# Patient Record
Sex: FEMALE | Race: WHITE | NOT HISPANIC OR LATINO | URBAN - METROPOLITAN AREA
[De-identification: names, ages, dates, MRNs, and addresses within clinical notes are randomized per-mention and may not be internally consistent; named-entity substitution may affect disease eponyms.]

---

## 2017-06-05 ENCOUNTER — OUTPATIENT (OUTPATIENT)
Dept: OUTPATIENT SERVICES | Facility: HOSPITAL | Age: 67
LOS: 1 days | Discharge: HOME | End: 2017-06-05

## 2017-06-28 DIAGNOSIS — R52 PAIN, UNSPECIFIED: ICD-10-CM

## 2017-10-14 ENCOUNTER — EMERGENCY (EMERGENCY)
Facility: HOSPITAL | Age: 67
LOS: 0 days | Discharge: HOME | End: 2017-10-14

## 2017-10-14 DIAGNOSIS — Z79.899 OTHER LONG TERM (CURRENT) DRUG THERAPY: ICD-10-CM

## 2017-10-14 DIAGNOSIS — Z91.040 LATEX ALLERGY STATUS: ICD-10-CM

## 2017-10-14 DIAGNOSIS — K21.9 GASTRO-ESOPHAGEAL REFLUX DISEASE WITHOUT ESOPHAGITIS: ICD-10-CM

## 2017-10-14 DIAGNOSIS — Z98.891 HISTORY OF UTERINE SCAR FROM PREVIOUS SURGERY: ICD-10-CM

## 2017-10-14 DIAGNOSIS — Z98.890 OTHER SPECIFIED POSTPROCEDURAL STATES: ICD-10-CM

## 2017-10-14 DIAGNOSIS — E03.9 HYPOTHYROIDISM, UNSPECIFIED: ICD-10-CM

## 2017-10-14 DIAGNOSIS — R10.9 UNSPECIFIED ABDOMINAL PAIN: ICD-10-CM

## 2017-10-14 DIAGNOSIS — Z88.0 ALLERGY STATUS TO PENICILLIN: ICD-10-CM

## 2017-10-14 DIAGNOSIS — Z88.5 ALLERGY STATUS TO NARCOTIC AGENT: ICD-10-CM

## 2017-10-14 DIAGNOSIS — I10 ESSENTIAL (PRIMARY) HYPERTENSION: ICD-10-CM

## 2018-02-23 ENCOUNTER — OUTPATIENT (OUTPATIENT)
Dept: OUTPATIENT SERVICES | Facility: HOSPITAL | Age: 68
LOS: 1 days | Discharge: HOME | End: 2018-02-23

## 2018-02-23 DIAGNOSIS — Z12.31 ENCOUNTER FOR SCREENING MAMMOGRAM FOR MALIGNANT NEOPLASM OF BREAST: ICD-10-CM

## 2018-05-02 ENCOUNTER — EMERGENCY (EMERGENCY)
Facility: HOSPITAL | Age: 68
LOS: 0 days | Discharge: HOME | End: 2018-05-02
Attending: EMERGENCY MEDICINE | Admitting: EMERGENCY MEDICINE

## 2018-05-02 VITALS
TEMPERATURE: 97 F | RESPIRATION RATE: 18 BRPM | DIASTOLIC BLOOD PRESSURE: 80 MMHG | WEIGHT: 195.11 LBS | SYSTOLIC BLOOD PRESSURE: 123 MMHG | HEIGHT: 69 IN | OXYGEN SATURATION: 99 % | HEART RATE: 92 BPM

## 2018-05-02 VITALS
OXYGEN SATURATION: 99 % | SYSTOLIC BLOOD PRESSURE: 142 MMHG | HEART RATE: 78 BPM | RESPIRATION RATE: 16 BRPM | DIASTOLIC BLOOD PRESSURE: 76 MMHG

## 2018-05-02 DIAGNOSIS — Z88.5 ALLERGY STATUS TO NARCOTIC AGENT: ICD-10-CM

## 2018-05-02 DIAGNOSIS — E03.9 HYPOTHYROIDISM, UNSPECIFIED: ICD-10-CM

## 2018-05-02 DIAGNOSIS — Z91.040 LATEX ALLERGY STATUS: ICD-10-CM

## 2018-05-02 DIAGNOSIS — Z79.899 OTHER LONG TERM (CURRENT) DRUG THERAPY: ICD-10-CM

## 2018-05-02 DIAGNOSIS — I10 ESSENTIAL (PRIMARY) HYPERTENSION: ICD-10-CM

## 2018-05-02 DIAGNOSIS — Z88.8 ALLERGY STATUS TO OTHER DRUGS, MEDICAMENTS AND BIOLOGICAL SUBSTANCES: ICD-10-CM

## 2018-05-02 DIAGNOSIS — R19.7 DIARRHEA, UNSPECIFIED: ICD-10-CM

## 2018-05-02 DIAGNOSIS — Z88.0 ALLERGY STATUS TO PENICILLIN: ICD-10-CM

## 2018-05-02 DIAGNOSIS — R11.2 NAUSEA WITH VOMITING, UNSPECIFIED: ICD-10-CM

## 2018-05-02 LAB
ALBUMIN SERPL ELPH-MCNC: 4.2 G/DL — SIGNIFICANT CHANGE UP (ref 3.5–5.2)
ALP SERPL-CCNC: 100 U/L — SIGNIFICANT CHANGE UP (ref 30–115)
ALT FLD-CCNC: 23 U/L — SIGNIFICANT CHANGE UP (ref 0–41)
ANION GAP SERPL CALC-SCNC: 13 MMOL/L — SIGNIFICANT CHANGE UP (ref 7–14)
APPEARANCE UR: (no result)
APTT BLD: 29.4 SEC — SIGNIFICANT CHANGE UP (ref 27–39.2)
AST SERPL-CCNC: 20 U/L — SIGNIFICANT CHANGE UP (ref 0–41)
BACTERIA # UR AUTO: (no result)
BASOPHILS # BLD AUTO: 0.01 K/UL — SIGNIFICANT CHANGE UP (ref 0–0.2)
BASOPHILS NFR BLD AUTO: 0.1 % — SIGNIFICANT CHANGE UP (ref 0–1)
BILIRUB SERPL-MCNC: 0.3 MG/DL — SIGNIFICANT CHANGE UP (ref 0.2–1.2)
BILIRUB UR-MCNC: NEGATIVE — SIGNIFICANT CHANGE UP
BUN SERPL-MCNC: 20 MG/DL — SIGNIFICANT CHANGE UP (ref 10–20)
CALCIUM SERPL-MCNC: 9.1 MG/DL — SIGNIFICANT CHANGE UP (ref 8.5–10.1)
CHLORIDE SERPL-SCNC: 93 MMOL/L — LOW (ref 98–110)
CO2 SERPL-SCNC: 30 MMOL/L — SIGNIFICANT CHANGE UP (ref 17–32)
COD CRY URNS QL: NEGATIVE — SIGNIFICANT CHANGE UP
COLOR SPEC: YELLOW — SIGNIFICANT CHANGE UP
CREAT SERPL-MCNC: 0.7 MG/DL — SIGNIFICANT CHANGE UP (ref 0.7–1.5)
DIFF PNL FLD: (no result)
EOSINOPHIL # BLD AUTO: 0.05 K/UL — SIGNIFICANT CHANGE UP (ref 0–0.7)
EOSINOPHIL NFR BLD AUTO: 0.6 % — SIGNIFICANT CHANGE UP (ref 0–8)
EPI CELLS # UR: (no result) /HPF
GLUCOSE SERPL-MCNC: 126 MG/DL — HIGH (ref 70–99)
GLUCOSE UR QL: NEGATIVE MG/DL — SIGNIFICANT CHANGE UP
HCT VFR BLD CALC: 41 % — SIGNIFICANT CHANGE UP (ref 37–47)
HGB BLD-MCNC: 13.9 G/DL — SIGNIFICANT CHANGE UP (ref 12–16)
IMM GRANULOCYTES NFR BLD AUTO: 0.2 % — SIGNIFICANT CHANGE UP (ref 0.1–0.3)
INR BLD: 1.15 RATIO — SIGNIFICANT CHANGE UP (ref 0.65–1.3)
KETONES UR-MCNC: NEGATIVE — SIGNIFICANT CHANGE UP
LEUKOCYTE ESTERASE UR-ACNC: (no result)
LIDOCAIN IGE QN: 31 U/L — SIGNIFICANT CHANGE UP (ref 7–60)
LYMPHOCYTES # BLD AUTO: 0.58 K/UL — LOW (ref 1.2–3.4)
LYMPHOCYTES # BLD AUTO: 6.9 % — LOW (ref 20.5–51.1)
MCHC RBC-ENTMCNC: 30.3 PG — SIGNIFICANT CHANGE UP (ref 27–31)
MCHC RBC-ENTMCNC: 33.9 G/DL — SIGNIFICANT CHANGE UP (ref 32–37)
MCV RBC AUTO: 89.3 FL — SIGNIFICANT CHANGE UP (ref 81–99)
MONOCYTES # BLD AUTO: 0.56 K/UL — SIGNIFICANT CHANGE UP (ref 0.1–0.6)
MONOCYTES NFR BLD AUTO: 6.7 % — SIGNIFICANT CHANGE UP (ref 1.7–9.3)
NEUTROPHILS # BLD AUTO: 7.16 K/UL — HIGH (ref 1.4–6.5)
NEUTROPHILS NFR BLD AUTO: 85.5 % — HIGH (ref 42.2–75.2)
NITRITE UR-MCNC: NEGATIVE — SIGNIFICANT CHANGE UP
NRBC # BLD: 0 /100 WBCS — SIGNIFICANT CHANGE UP (ref 0–0)
PH UR: 7 — SIGNIFICANT CHANGE UP (ref 5–8)
PLATELET # BLD AUTO: 232 K/UL — SIGNIFICANT CHANGE UP (ref 130–400)
POTASSIUM SERPL-MCNC: 4.1 MMOL/L — SIGNIFICANT CHANGE UP (ref 3.5–5)
POTASSIUM SERPL-SCNC: 4.1 MMOL/L — SIGNIFICANT CHANGE UP (ref 3.5–5)
PROT SERPL-MCNC: 6.9 G/DL — SIGNIFICANT CHANGE UP (ref 6–8)
PROT UR-MCNC: NEGATIVE MG/DL — SIGNIFICANT CHANGE UP
PROTHROM AB SERPL-ACNC: 12.5 SEC — SIGNIFICANT CHANGE UP (ref 9.95–12.87)
RBC # BLD: 4.59 M/UL — SIGNIFICANT CHANGE UP (ref 4.2–5.4)
RBC # FLD: 13.7 % — SIGNIFICANT CHANGE UP (ref 11.5–14.5)
RBC CASTS # UR COMP ASSIST: (no result) /HPF
SODIUM SERPL-SCNC: 136 MMOL/L — SIGNIFICANT CHANGE UP (ref 135–146)
SP GR SPEC: 1.01 — SIGNIFICANT CHANGE UP (ref 1.01–1.03)
TRI-PHOS CRY UR QL COMP ASSIST: NEGATIVE — SIGNIFICANT CHANGE UP
URATE CRY FLD QL MICRO: NEGATIVE — SIGNIFICANT CHANGE UP
UROBILINOGEN FLD QL: 0.2 MG/DL — SIGNIFICANT CHANGE UP (ref 0.2–0.2)
WBC # BLD: 8.38 K/UL — SIGNIFICANT CHANGE UP (ref 4.8–10.8)
WBC # FLD AUTO: 8.38 K/UL — SIGNIFICANT CHANGE UP (ref 4.8–10.8)
WBC UR QL: SIGNIFICANT CHANGE UP /HPF

## 2018-05-02 RX ORDER — FAMOTIDINE 10 MG/ML
20 INJECTION INTRAVENOUS DAILY
Qty: 0 | Refills: 0 | Status: DISCONTINUED | OUTPATIENT
Start: 2018-05-02 | End: 2018-05-02

## 2018-05-02 RX ORDER — FAMOTIDINE 10 MG/ML
40 INJECTION INTRAVENOUS ONCE
Qty: 0 | Refills: 0 | Status: DISCONTINUED | OUTPATIENT
Start: 2018-05-02 | End: 2018-05-02

## 2018-05-02 RX ORDER — ONDANSETRON 8 MG/1
4 TABLET, FILM COATED ORAL ONCE
Qty: 0 | Refills: 0 | Status: COMPLETED | OUTPATIENT
Start: 2018-05-02 | End: 2018-05-02

## 2018-05-02 RX ORDER — SODIUM CHLORIDE 9 MG/ML
1000 INJECTION INTRAMUSCULAR; INTRAVENOUS; SUBCUTANEOUS ONCE
Qty: 0 | Refills: 0 | Status: COMPLETED | OUTPATIENT
Start: 2018-05-02 | End: 2018-05-02

## 2018-05-02 RX ADMIN — FAMOTIDINE 100 MILLIGRAM(S): 10 INJECTION INTRAVENOUS at 18:53

## 2018-05-02 RX ADMIN — ONDANSETRON 4 MILLIGRAM(S): 8 TABLET, FILM COATED ORAL at 21:05

## 2018-05-02 RX ADMIN — ONDANSETRON 4 MILLIGRAM(S): 8 TABLET, FILM COATED ORAL at 18:53

## 2018-05-02 RX ADMIN — SODIUM CHLORIDE 1 MILLILITER(S): 9 INJECTION INTRAMUSCULAR; INTRAVENOUS; SUBCUTANEOUS at 18:53

## 2018-05-02 NOTE — ED PROVIDER NOTE - NS ED ROS FT
Review of Systems:  	•	CONSTITUTIONAL - no fever, no diaphoresis, no chills  	•	SKIN - no rash  	•	HEMATOLOGIC - no bleeding, no bruising  	•	EYES - no eye pain, no blurry vision  	•	ENT - no change in hearing, no sore throat, no ear pain or tinnitus  	•	RESPIRATORY - no shortness of breath, no cough  	•	CARDIAC - no chest pain, no palpitations  	•	GI - no abd pain,  nausea,  vomiting, diarrhea, no constipation  	•	GENITO-URINARY - no discharge, no dysuria; no hematuria, no increased urinary frequency  	•	MUSCULOSKELETAL - no joint paint, no swelling, no redness  	•	NEUROLOGIC - no weakness, no headache, no paresthesias, no LOC  	•	PSYCH - no anxiety, non suicidal, non homicidal, no hallucination, no depression

## 2018-05-02 NOTE — ED PROVIDER NOTE - MEDICAL DECISION MAKING DETAILS
Results reviewed, d/w patient.  will return if worse, rec oral hydration, bland diet as tolerated, f/u PMD , return if worse

## 2018-05-02 NOTE — ED PROVIDER NOTE - OBJECTIVE STATEMENT
this is 68 yo female presents to ed for evalution of N/V/D  patient states diarrhea is better now.   patietn is still nauseous but no vomiting.   patient denies any abdominal pain . no fever no chills

## 2018-05-02 NOTE — ED PROVIDER NOTE - PROGRESS NOTE DETAILS
patient is feeling better Patient to be discharged from ED. Any available test results were discussed with patient and/or family. Verbal instructions given, including instructions to return to ED immediately for any new, worsening, or concerning symptoms. Patient endorsed understanding. Written discharge instructions additionally given, including follow-up plan.

## 2018-05-02 NOTE — ED PROVIDER NOTE - ATTENDING CONTRIBUTION TO CARE
67yF pmhx  hypothryoid  PUD, hiatal hernia ( ad) p/w  nausea and abdominal pain . Pt was seen in ED earlier  with n/v/d -  felt better tolerated PO -  dcd home- while  ath ome  no longer with diarrhea however  nausea returned tonight t-  felt lightheaded while sitting judy toilet bowl -  came to ER. no chest pain no syncope  no  blood per rectum  no vomiting  PE alert nontoxic no pallor cvs rrr reps cta abd sfot mild epigastric tenderness  - plan labs  IVF, ekg, CT 66 yo F presents with c/o nausea, vomiting and diarrhea x 1 days.   recently admitted for diarrhea. no fever or chills.  On exam pt inNAD AAO x 3, MMM, Abd is soft + BS, NT ND, Lungs CTA B/L. no edema

## 2018-05-03 ENCOUNTER — EMERGENCY (EMERGENCY)
Facility: HOSPITAL | Age: 68
LOS: 0 days | Discharge: HOME | End: 2018-05-03
Attending: EMERGENCY MEDICINE | Admitting: EMERGENCY MEDICINE

## 2018-05-03 VITALS
HEART RATE: 75 BPM | TEMPERATURE: 98 F | RESPIRATION RATE: 18 BRPM | OXYGEN SATURATION: 95 % | SYSTOLIC BLOOD PRESSURE: 111 MMHG | DIASTOLIC BLOOD PRESSURE: 54 MMHG

## 2018-05-03 VITALS
TEMPERATURE: 97 F | SYSTOLIC BLOOD PRESSURE: 120 MMHG | WEIGHT: 190.04 LBS | RESPIRATION RATE: 18 BRPM | HEART RATE: 78 BPM | DIASTOLIC BLOOD PRESSURE: 60 MMHG | HEIGHT: 69 IN

## 2018-05-03 DIAGNOSIS — Z88.6 ALLERGY STATUS TO ANALGESIC AGENT: ICD-10-CM

## 2018-05-03 DIAGNOSIS — E03.9 HYPOTHYROIDISM, UNSPECIFIED: ICD-10-CM

## 2018-05-03 DIAGNOSIS — R10.84 GENERALIZED ABDOMINAL PAIN: ICD-10-CM

## 2018-05-03 DIAGNOSIS — R11.2 NAUSEA WITH VOMITING, UNSPECIFIED: ICD-10-CM

## 2018-05-03 DIAGNOSIS — Z91.040 LATEX ALLERGY STATUS: ICD-10-CM

## 2018-05-03 DIAGNOSIS — R10.13 EPIGASTRIC PAIN: ICD-10-CM

## 2018-05-03 DIAGNOSIS — Z98.891 HISTORY OF UTERINE SCAR FROM PREVIOUS SURGERY: Chronic | ICD-10-CM

## 2018-05-03 DIAGNOSIS — Z98.890 OTHER SPECIFIED POSTPROCEDURAL STATES: ICD-10-CM

## 2018-05-03 DIAGNOSIS — I10 ESSENTIAL (PRIMARY) HYPERTENSION: ICD-10-CM

## 2018-05-03 DIAGNOSIS — R14.0 ABDOMINAL DISTENSION (GASEOUS): ICD-10-CM

## 2018-05-03 DIAGNOSIS — Z88.0 ALLERGY STATUS TO PENICILLIN: ICD-10-CM

## 2018-05-03 DIAGNOSIS — Z79.899 OTHER LONG TERM (CURRENT) DRUG THERAPY: ICD-10-CM

## 2018-05-03 DIAGNOSIS — K21.9 GASTRO-ESOPHAGEAL REFLUX DISEASE WITHOUT ESOPHAGITIS: ICD-10-CM

## 2018-05-03 LAB
ALBUMIN SERPL ELPH-MCNC: 3.8 G/DL — SIGNIFICANT CHANGE UP (ref 3.5–5.2)
ALP SERPL-CCNC: 95 U/L — SIGNIFICANT CHANGE UP (ref 30–115)
ALT FLD-CCNC: 24 U/L — SIGNIFICANT CHANGE UP (ref 0–41)
ANION GAP SERPL CALC-SCNC: 11 MMOL/L — SIGNIFICANT CHANGE UP (ref 7–14)
AST SERPL-CCNC: 24 U/L — SIGNIFICANT CHANGE UP (ref 0–41)
BASOPHILS # BLD AUTO: 0.01 K/UL — SIGNIFICANT CHANGE UP (ref 0–0.2)
BASOPHILS NFR BLD AUTO: 0.2 % — SIGNIFICANT CHANGE UP (ref 0–1)
BILIRUB SERPL-MCNC: 0.4 MG/DL — SIGNIFICANT CHANGE UP (ref 0.2–1.2)
BUN SERPL-MCNC: 18 MG/DL — SIGNIFICANT CHANGE UP (ref 10–20)
CALCIUM SERPL-MCNC: 9 MG/DL — SIGNIFICANT CHANGE UP (ref 8.5–10.1)
CHLORIDE SERPL-SCNC: 99 MMOL/L — SIGNIFICANT CHANGE UP (ref 98–110)
CO2 SERPL-SCNC: 27 MMOL/L — SIGNIFICANT CHANGE UP (ref 17–32)
CREAT SERPL-MCNC: 0.8 MG/DL — SIGNIFICANT CHANGE UP (ref 0.7–1.5)
EOSINOPHIL # BLD AUTO: 0.01 K/UL — SIGNIFICANT CHANGE UP (ref 0–0.7)
EOSINOPHIL NFR BLD AUTO: 0.2 % — SIGNIFICANT CHANGE UP (ref 0–8)
GLUCOSE SERPL-MCNC: 140 MG/DL — HIGH (ref 70–99)
HCT VFR BLD CALC: 40.5 % — SIGNIFICANT CHANGE UP (ref 37–47)
HGB BLD-MCNC: 13.8 G/DL — SIGNIFICANT CHANGE UP (ref 12–16)
IMM GRANULOCYTES NFR BLD AUTO: 0.3 % — SIGNIFICANT CHANGE UP (ref 0.1–0.3)
LACTATE SERPL-SCNC: 1.1 MMOL/L — SIGNIFICANT CHANGE UP (ref 0.5–2.2)
LIDOCAIN IGE QN: 29 U/L — SIGNIFICANT CHANGE UP (ref 7–60)
LYMPHOCYTES # BLD AUTO: 0.66 K/UL — LOW (ref 1.2–3.4)
LYMPHOCYTES # BLD AUTO: 11.1 % — LOW (ref 20.5–51.1)
MCHC RBC-ENTMCNC: 30.3 PG — SIGNIFICANT CHANGE UP (ref 27–31)
MCHC RBC-ENTMCNC: 34.1 G/DL — SIGNIFICANT CHANGE UP (ref 32–37)
MCV RBC AUTO: 89 FL — SIGNIFICANT CHANGE UP (ref 81–99)
MONOCYTES # BLD AUTO: 0.43 K/UL — SIGNIFICANT CHANGE UP (ref 0.1–0.6)
MONOCYTES NFR BLD AUTO: 7.3 % — SIGNIFICANT CHANGE UP (ref 1.7–9.3)
NEUTROPHILS # BLD AUTO: 4.79 K/UL — SIGNIFICANT CHANGE UP (ref 1.4–6.5)
NEUTROPHILS NFR BLD AUTO: 80.9 % — HIGH (ref 42.2–75.2)
NRBC # BLD: 0 /100 WBCS — SIGNIFICANT CHANGE UP (ref 0–0)
PLATELET # BLD AUTO: 223 K/UL — SIGNIFICANT CHANGE UP (ref 130–400)
POTASSIUM SERPL-MCNC: 3.8 MMOL/L — SIGNIFICANT CHANGE UP (ref 3.5–5)
POTASSIUM SERPL-SCNC: 3.8 MMOL/L — SIGNIFICANT CHANGE UP (ref 3.5–5)
PROT SERPL-MCNC: 6.8 G/DL — SIGNIFICANT CHANGE UP (ref 6–8)
RBC # BLD: 4.55 M/UL — SIGNIFICANT CHANGE UP (ref 4.2–5.4)
RBC # FLD: 14 % — SIGNIFICANT CHANGE UP (ref 11.5–14.5)
SODIUM SERPL-SCNC: 137 MMOL/L — SIGNIFICANT CHANGE UP (ref 135–146)
WBC # BLD: 5.92 K/UL — SIGNIFICANT CHANGE UP (ref 4.8–10.8)
WBC # FLD AUTO: 5.92 K/UL — SIGNIFICANT CHANGE UP (ref 4.8–10.8)

## 2018-05-03 RX ORDER — ONDANSETRON 8 MG/1
4 TABLET, FILM COATED ORAL ONCE
Qty: 0 | Refills: 0 | Status: COMPLETED | OUTPATIENT
Start: 2018-05-03 | End: 2018-05-03

## 2018-05-03 RX ORDER — ONDANSETRON 8 MG/1
1 TABLET, FILM COATED ORAL
Qty: 12 | Refills: 0 | OUTPATIENT
Start: 2018-05-03 | End: 2018-05-06

## 2018-05-03 RX ORDER — SUCRALFATE 1 G
1 TABLET ORAL
Qty: 28 | Refills: 0 | OUTPATIENT
Start: 2018-05-03 | End: 2018-05-09

## 2018-05-03 RX ORDER — ACETAMINOPHEN 500 MG
975 TABLET ORAL ONCE
Qty: 0 | Refills: 0 | Status: COMPLETED | OUTPATIENT
Start: 2018-05-03 | End: 2018-05-03

## 2018-05-03 RX ORDER — SODIUM CHLORIDE 9 MG/ML
1000 INJECTION INTRAMUSCULAR; INTRAVENOUS; SUBCUTANEOUS
Qty: 0 | Refills: 0 | Status: DISCONTINUED | OUTPATIENT
Start: 2018-05-03 | End: 2018-05-03

## 2018-05-03 RX ADMIN — ONDANSETRON 4 MILLIGRAM(S): 8 TABLET, FILM COATED ORAL at 03:30

## 2018-05-03 RX ADMIN — Medication 975 MILLIGRAM(S): at 05:04

## 2018-05-03 RX ADMIN — SODIUM CHLORIDE 150 MILLILITER(S): 9 INJECTION INTRAMUSCULAR; INTRAVENOUS; SUBCUTANEOUS at 03:30

## 2018-05-03 NOTE — ED PROVIDER NOTE - PHYSICAL EXAMINATION
CONSTITUTIONAL: Well-appearing; well-nourished; in no apparent distress.   EYES: PERRL; EOM intact.   ENT: normal nose; no rhinorrhea; normal pharynx with no tonsillar hypertrophy.   NECK: Supple; non-tender; no cervical lymphadenopathy. No JVD.   CARDIOVASCULAR: Normal S1, S2; no murmurs, rubs, or gallops.   RESPIRATORY: Normal chest excursion with respiration; breath sounds clear and equal bilaterally; no wheezes, rhonchi, or rales.  GI: mildly distended abdomen. no focal tenderness. no rebound and guarding.   MS: No evidence of trauma or deformity. Non-tender to palpation. No scoliosis. No CVA tenderness. Normal ROM in all four extremities; non-tender to palpation; distal pulses are normal.   SKIN: Normal for age and race; warm; dry; good turgor; no apparent lesions or exudate.   NEURO/PSYCH: A & O x 4; grossly unremarkable. mood and manner are appropriate. Grooming and personal hygiene are appropriate. No apparent thoughts of harm to self or others.

## 2018-05-03 NOTE — ED PROVIDER NOTE - ATTENDING CONTRIBUTION TO CARE
67yF pmhx  hypothyroid  PUD, hiatal hernia ( megna) p/w  nausea and abdominal pain . Pt was seen in ED earlier  with n/v/d -  felt better tolerated PO -  dcd home- while  ath ome  no longer with diarrhea however  nausea returned tonight t-  felt lightheaded while sitting judy toilet bowl -  came to ER. no chest pain no syncope  no  blood per rectum  no vomiting  PE alert nontoxic no pallor cvs rrr reps cta abd sfot mild epigastric tenderness  - plan labs  IVF, ekg, CT 67yF pmhx  hypothyroid  PUD, hiatal hernia ( megna) p/w  nausea and abdominal pain . Pt was seen in ED earlier  with n/v/d -  felt better tolerated PO -  dcd home- while  at home  no longer with diarrhea however  nausea returned tonight t-  felt lightheaded while sitting judy toilet bowl -  came to ER. no chest pain no syncope  no  blood per rectum  no vomiting  PE alert nontoxic no pallor cvs rrr reps cta abd sfot mild epigastric tenderness  - plan labs  IVF, ekg, CT

## 2018-05-03 NOTE — ED PROVIDER NOTE - OBJECTIVE STATEMENT
66 yo female hx of hypothyroid/GERD and HTN present c/o nausea and generalized abdominal pain. patient reports she had watery diarrhea x 5 yesterday and had nausea since. patient reports her  had similar sxs few days ago and he is admitted to hospital. patient denies diarrhea and vomiting today. patient was seen in ED earlier today and was sent home after she felt better.   Denies fever/chill/HA/dizziness/chest pain/palpitation/sob/v/d/ black stool/bloody stool/urinary sxs

## 2018-05-03 NOTE — ED PROVIDER NOTE - PROGRESS NOTE DETAILS
Patient to be discharged from ED. Any available test results were discussed with patient and/or family. Verbal instructions given, including instructions to return to ED immediately for any new, worsening, or concerning symptoms. Patient endorsed understanding. Written discharge instructions additionally given, including follow-up plan.  follow up  GI  Dr Whittington -  rest  fluids, will give zofran -  and recommend  PPI and sulcrafate

## 2018-05-03 NOTE — ED ADULT NURSE REASSESSMENT NOTE - NS ED NURSE REASSESS COMMENT FT1
Pt continues to c/o generalized weakness/ nausea/ neck pain. IVF infusing well to RIGHT A/C; pt AA&Ox3; breathing RA. Pt stable; endorsed to oncoming tour.

## 2018-07-12 NOTE — ASU PATIENT PROFILE, ADULT - PMH
Cataract    Gastroesophageal reflux disease    History of palpitations    Hypertension    Hypothyroid    Migraines    ETHAN on CPAP

## 2018-07-13 ENCOUNTER — OUTPATIENT (OUTPATIENT)
Dept: OUTPATIENT SERVICES | Facility: HOSPITAL | Age: 68
LOS: 1 days | Discharge: HOME | End: 2018-07-13

## 2018-07-13 VITALS
DIASTOLIC BLOOD PRESSURE: 58 MMHG | TEMPERATURE: 96 F | RESPIRATION RATE: 18 BRPM | SYSTOLIC BLOOD PRESSURE: 114 MMHG | OXYGEN SATURATION: 99 % | WEIGHT: 197.09 LBS | HEIGHT: 68 IN | HEART RATE: 69 BPM

## 2018-07-13 VITALS — HEART RATE: 65 BPM | RESPIRATION RATE: 17 BRPM | DIASTOLIC BLOOD PRESSURE: 64 MMHG | SYSTOLIC BLOOD PRESSURE: 123 MMHG

## 2018-07-13 DIAGNOSIS — Z98.891 HISTORY OF UTERINE SCAR FROM PREVIOUS SURGERY: Chronic | ICD-10-CM

## 2018-07-13 DIAGNOSIS — Z98.890 OTHER SPECIFIED POSTPROCEDURAL STATES: Chronic | ICD-10-CM

## 2018-07-13 NOTE — PACU DISCHARGE NOTE - COMMENTS
vitals as per anesthesia record  perioperative course uneventful  no obvious anesthesia related issues

## 2018-07-17 DIAGNOSIS — H52.202 UNSPECIFIED ASTIGMATISM, LEFT EYE: ICD-10-CM

## 2018-07-17 DIAGNOSIS — H25.89 OTHER AGE-RELATED CATARACT: ICD-10-CM

## 2018-07-17 DIAGNOSIS — Z88.0 ALLERGY STATUS TO PENICILLIN: ICD-10-CM

## 2018-07-17 DIAGNOSIS — Z88.5 ALLERGY STATUS TO NARCOTIC AGENT: ICD-10-CM

## 2018-07-20 ENCOUNTER — OUTPATIENT (OUTPATIENT)
Dept: OUTPATIENT SERVICES | Facility: HOSPITAL | Age: 68
LOS: 1 days | Discharge: HOME | End: 2018-07-20

## 2018-07-20 VITALS
DIASTOLIC BLOOD PRESSURE: 61 MMHG | SYSTOLIC BLOOD PRESSURE: 117 MMHG | TEMPERATURE: 97 F | WEIGHT: 199.08 LBS | RESPIRATION RATE: 18 BRPM

## 2018-07-20 VITALS — DIASTOLIC BLOOD PRESSURE: 67 MMHG | HEART RATE: 63 BPM | SYSTOLIC BLOOD PRESSURE: 119 MMHG | RESPIRATION RATE: 18 BRPM

## 2018-07-20 DIAGNOSIS — Z98.890 OTHER SPECIFIED POSTPROCEDURAL STATES: Chronic | ICD-10-CM

## 2018-07-20 DIAGNOSIS — Z98.891 HISTORY OF UTERINE SCAR FROM PREVIOUS SURGERY: Chronic | ICD-10-CM

## 2018-07-20 DIAGNOSIS — Z98.42 CATARACT EXTRACTION STATUS, LEFT EYE: Chronic | ICD-10-CM

## 2018-07-20 RX ORDER — BUTALBITAL
0 POWDER (GRAM) MISCELLANEOUS
Qty: 0 | Refills: 0 | COMMUNITY

## 2018-07-20 RX ORDER — MOXIFLOXACIN HYDROCHLORIDE TABLETS, 400 MG 400 MG/1
1 TABLET, FILM COATED ORAL
Qty: 0 | Refills: 0 | COMMUNITY

## 2018-07-25 DIAGNOSIS — E03.9 HYPOTHYROIDISM, UNSPECIFIED: ICD-10-CM

## 2018-07-25 DIAGNOSIS — Z91.040 LATEX ALLERGY STATUS: ICD-10-CM

## 2018-07-25 DIAGNOSIS — Z88.5 ALLERGY STATUS TO NARCOTIC AGENT: ICD-10-CM

## 2018-07-25 DIAGNOSIS — H52.201 UNSPECIFIED ASTIGMATISM, RIGHT EYE: ICD-10-CM

## 2018-07-25 DIAGNOSIS — Z88.0 ALLERGY STATUS TO PENICILLIN: ICD-10-CM

## 2018-07-25 DIAGNOSIS — Z88.8 ALLERGY STATUS TO OTHER DRUGS, MEDICAMENTS AND BIOLOGICAL SUBSTANCES: ICD-10-CM

## 2018-07-25 DIAGNOSIS — H25.11 AGE-RELATED NUCLEAR CATARACT, RIGHT EYE: ICD-10-CM

## 2018-07-25 DIAGNOSIS — Z98.42 CATARACT EXTRACTION STATUS, LEFT EYE: ICD-10-CM

## 2018-07-25 DIAGNOSIS — G47.33 OBSTRUCTIVE SLEEP APNEA (ADULT) (PEDIATRIC): ICD-10-CM

## 2018-07-25 DIAGNOSIS — K21.9 GASTRO-ESOPHAGEAL REFLUX DISEASE WITHOUT ESOPHAGITIS: ICD-10-CM

## 2018-07-25 DIAGNOSIS — G43.909 MIGRAINE, UNSPECIFIED, NOT INTRACTABLE, WITHOUT STATUS MIGRAINOSUS: ICD-10-CM

## 2018-07-25 DIAGNOSIS — I10 ESSENTIAL (PRIMARY) HYPERTENSION: ICD-10-CM

## 2018-08-30 ENCOUNTER — TRANSCRIPTION ENCOUNTER (OUTPATIENT)
Age: 68
End: 2018-08-30

## 2018-09-09 ENCOUNTER — EMERGENCY (EMERGENCY)
Facility: HOSPITAL | Age: 68
LOS: 0 days | Discharge: HOME | End: 2018-09-09
Attending: EMERGENCY MEDICINE | Admitting: EMERGENCY MEDICINE

## 2018-09-09 VITALS
RESPIRATION RATE: 19 BRPM | WEIGHT: 197.09 LBS | HEART RATE: 78 BPM | HEIGHT: 68 IN | TEMPERATURE: 98 F | DIASTOLIC BLOOD PRESSURE: 88 MMHG | OXYGEN SATURATION: 95 % | SYSTOLIC BLOOD PRESSURE: 143 MMHG

## 2018-09-09 VITALS
RESPIRATION RATE: 18 BRPM | OXYGEN SATURATION: 99 % | HEART RATE: 77 BPM | SYSTOLIC BLOOD PRESSURE: 156 MMHG | DIASTOLIC BLOOD PRESSURE: 79 MMHG

## 2018-09-09 DIAGNOSIS — Z91.040 LATEX ALLERGY STATUS: ICD-10-CM

## 2018-09-09 DIAGNOSIS — Z88.0 ALLERGY STATUS TO PENICILLIN: ICD-10-CM

## 2018-09-09 DIAGNOSIS — Z88.4 ALLERGY STATUS TO ANESTHETIC AGENT: ICD-10-CM

## 2018-09-09 DIAGNOSIS — Z98.42 CATARACT EXTRACTION STATUS, LEFT EYE: ICD-10-CM

## 2018-09-09 DIAGNOSIS — Z88.5 ALLERGY STATUS TO NARCOTIC AGENT: ICD-10-CM

## 2018-09-09 DIAGNOSIS — Z98.890 OTHER SPECIFIED POSTPROCEDURAL STATES: ICD-10-CM

## 2018-09-09 DIAGNOSIS — Z88.8 ALLERGY STATUS TO OTHER DRUGS, MEDICAMENTS AND BIOLOGICAL SUBSTANCES: ICD-10-CM

## 2018-09-09 DIAGNOSIS — Z79.899 OTHER LONG TERM (CURRENT) DRUG THERAPY: ICD-10-CM

## 2018-09-09 DIAGNOSIS — Z98.42 CATARACT EXTRACTION STATUS, LEFT EYE: Chronic | ICD-10-CM

## 2018-09-09 DIAGNOSIS — I10 ESSENTIAL (PRIMARY) HYPERTENSION: ICD-10-CM

## 2018-09-09 DIAGNOSIS — Z98.891 HISTORY OF UTERINE SCAR FROM PREVIOUS SURGERY: Chronic | ICD-10-CM

## 2018-09-09 DIAGNOSIS — E03.9 HYPOTHYROIDISM, UNSPECIFIED: ICD-10-CM

## 2018-09-09 DIAGNOSIS — Z98.890 OTHER SPECIFIED POSTPROCEDURAL STATES: Chronic | ICD-10-CM

## 2018-09-09 DIAGNOSIS — R07.89 OTHER CHEST PAIN: ICD-10-CM

## 2018-09-09 DIAGNOSIS — K21.9 GASTRO-ESOPHAGEAL REFLUX DISEASE WITHOUT ESOPHAGITIS: ICD-10-CM

## 2018-09-09 DIAGNOSIS — R07.9 CHEST PAIN, UNSPECIFIED: ICD-10-CM

## 2018-09-09 PROBLEM — G47.33 OBSTRUCTIVE SLEEP APNEA (ADULT) (PEDIATRIC): Chronic | Status: ACTIVE | Noted: 2018-07-13

## 2018-09-09 PROBLEM — G43.909 MIGRAINE, UNSPECIFIED, NOT INTRACTABLE, WITHOUT STATUS MIGRAINOSUS: Chronic | Status: ACTIVE | Noted: 2018-07-13

## 2018-09-09 PROBLEM — H26.9 UNSPECIFIED CATARACT: Chronic | Status: ACTIVE | Noted: 2018-07-13

## 2018-09-09 PROBLEM — Z87.898 PERSONAL HISTORY OF OTHER SPECIFIED CONDITIONS: Chronic | Status: ACTIVE | Noted: 2018-07-13

## 2018-09-09 LAB
ALBUMIN SERPL ELPH-MCNC: 4.1 G/DL — SIGNIFICANT CHANGE UP (ref 3.5–5.2)
ALP SERPL-CCNC: 90 U/L — SIGNIFICANT CHANGE UP (ref 30–115)
ALT FLD-CCNC: 19 U/L — SIGNIFICANT CHANGE UP (ref 0–41)
ANION GAP SERPL CALC-SCNC: 12 MMOL/L — SIGNIFICANT CHANGE UP (ref 7–14)
AST SERPL-CCNC: 30 U/L — SIGNIFICANT CHANGE UP (ref 0–41)
BASOPHILS # BLD AUTO: 0.01 K/UL — SIGNIFICANT CHANGE UP (ref 0–0.2)
BASOPHILS NFR BLD AUTO: 0.1 % — SIGNIFICANT CHANGE UP (ref 0–1)
BILIRUB SERPL-MCNC: <0.2 MG/DL — SIGNIFICANT CHANGE UP (ref 0.2–1.2)
BUN SERPL-MCNC: 17 MG/DL — SIGNIFICANT CHANGE UP (ref 10–20)
CALCIUM SERPL-MCNC: 9.3 MG/DL — SIGNIFICANT CHANGE UP (ref 8.5–10.1)
CHLORIDE SERPL-SCNC: 94 MMOL/L — LOW (ref 98–110)
CO2 SERPL-SCNC: 30 MMOL/L — SIGNIFICANT CHANGE UP (ref 17–32)
CREAT SERPL-MCNC: 0.9 MG/DL — SIGNIFICANT CHANGE UP (ref 0.7–1.5)
EOSINOPHIL # BLD AUTO: 0.15 K/UL — SIGNIFICANT CHANGE UP (ref 0–0.7)
EOSINOPHIL NFR BLD AUTO: 1.9 % — SIGNIFICANT CHANGE UP (ref 0–8)
GLUCOSE SERPL-MCNC: 100 MG/DL — HIGH (ref 70–99)
HCT VFR BLD CALC: 37.6 % — SIGNIFICANT CHANGE UP (ref 37–47)
HGB BLD-MCNC: 12.6 G/DL — SIGNIFICANT CHANGE UP (ref 12–16)
IMM GRANULOCYTES NFR BLD AUTO: 0.3 % — SIGNIFICANT CHANGE UP (ref 0.1–0.3)
LYMPHOCYTES # BLD AUTO: 2.48 K/UL — SIGNIFICANT CHANGE UP (ref 1.2–3.4)
LYMPHOCYTES # BLD AUTO: 31.4 % — SIGNIFICANT CHANGE UP (ref 20.5–51.1)
MCHC RBC-ENTMCNC: 30.2 PG — SIGNIFICANT CHANGE UP (ref 27–31)
MCHC RBC-ENTMCNC: 33.5 G/DL — SIGNIFICANT CHANGE UP (ref 32–37)
MCV RBC AUTO: 90.2 FL — SIGNIFICANT CHANGE UP (ref 81–99)
MONOCYTES # BLD AUTO: 0.82 K/UL — HIGH (ref 0.1–0.6)
MONOCYTES NFR BLD AUTO: 10.4 % — HIGH (ref 1.7–9.3)
NEUTROPHILS # BLD AUTO: 4.42 K/UL — SIGNIFICANT CHANGE UP (ref 1.4–6.5)
NEUTROPHILS NFR BLD AUTO: 55.9 % — SIGNIFICANT CHANGE UP (ref 42.2–75.2)
NRBC # BLD: 0 /100 WBCS — SIGNIFICANT CHANGE UP (ref 0–0)
PLATELET # BLD AUTO: 233 K/UL — SIGNIFICANT CHANGE UP (ref 130–400)
POTASSIUM SERPL-MCNC: 4.1 MMOL/L — SIGNIFICANT CHANGE UP (ref 3.5–5)
POTASSIUM SERPL-SCNC: 4.1 MMOL/L — SIGNIFICANT CHANGE UP (ref 3.5–5)
PROT SERPL-MCNC: 7 G/DL — SIGNIFICANT CHANGE UP (ref 6–8)
RBC # BLD: 4.17 M/UL — LOW (ref 4.2–5.4)
RBC # FLD: 13.8 % — SIGNIFICANT CHANGE UP (ref 11.5–14.5)
SODIUM SERPL-SCNC: 136 MMOL/L — SIGNIFICANT CHANGE UP (ref 135–146)
TROPONIN T SERPL-MCNC: <0.01 NG/ML — SIGNIFICANT CHANGE UP
TROPONIN T SERPL-MCNC: <0.01 NG/ML — SIGNIFICANT CHANGE UP
WBC # BLD: 7.9 K/UL — SIGNIFICANT CHANGE UP (ref 4.8–10.8)
WBC # FLD AUTO: 7.9 K/UL — SIGNIFICANT CHANGE UP (ref 4.8–10.8)

## 2018-09-09 RX ORDER — SODIUM CHLORIDE 9 MG/ML
3 INJECTION INTRAMUSCULAR; INTRAVENOUS; SUBCUTANEOUS ONCE
Qty: 0 | Refills: 0 | Status: COMPLETED | OUTPATIENT
Start: 2018-09-09 | End: 2018-09-09

## 2018-09-09 RX ADMIN — SODIUM CHLORIDE 3 MILLILITER(S): 9 INJECTION INTRAMUSCULAR; INTRAVENOUS; SUBCUTANEOUS at 15:49
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
## 2018-09-09 NOTE — ED PROVIDER NOTE - PROGRESS NOTE DETAILS
The patient wishes to leave against medical advice.  I have discussed the risks, benefits and alternatives (including the possibility of worsening of disease, pain, permanent disability, and/or death) with the patient and his/her family (if available).  The patient voices understanding of these risks, benefits, and alternatives and still wishes to sign out against medical advice.  The patient is awake, alert, oriented  x 3 and has demonstrated capacity to refuse/direct care.  I have advised the patient that they can and should return immediately should they develop any worse/different/additional symptoms, or if they change their mind and want to continue their care. pt now willing to stay for second trop pt pain free ate dinner family at bedside, result discussed, will d/c to f/u with her cardio Dr Post. Patient counseled regarding conditions which should prompt return.

## 2018-11-12 ENCOUNTER — APPOINTMENT (OUTPATIENT)
Dept: CARDIOLOGY | Facility: CLINIC | Age: 68
End: 2018-11-12

## 2018-12-20 ENCOUNTER — EMERGENCY (EMERGENCY)
Facility: HOSPITAL | Age: 68
LOS: 0 days | Discharge: HOME | End: 2018-12-21
Admitting: INTERNAL MEDICINE

## 2018-12-20 ENCOUNTER — INPATIENT (INPATIENT)
Facility: HOSPITAL | Age: 68
LOS: 1 days | Discharge: HOME | End: 2018-12-22
Attending: INTERNAL MEDICINE | Admitting: INTERNAL MEDICINE

## 2018-12-20 DIAGNOSIS — Z98.42 CATARACT EXTRACTION STATUS, LEFT EYE: Chronic | ICD-10-CM

## 2018-12-20 DIAGNOSIS — M25.571 PAIN IN RIGHT ANKLE AND JOINTS OF RIGHT FOOT: ICD-10-CM

## 2018-12-20 DIAGNOSIS — I10 ESSENTIAL (PRIMARY) HYPERTENSION: ICD-10-CM
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
## 2018-12-20 DIAGNOSIS — S93.401A SPRAIN OF UNSPECIFIED LIGAMENT OF RIGHT ANKLE, INITIAL ENCOUNTER: ICD-10-CM

## 2018-12-20 DIAGNOSIS — Z98.890 OTHER SPECIFIED POSTPROCEDURAL STATES: Chronic | ICD-10-CM
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
## 2018-12-20 DIAGNOSIS — R07.9 CHEST PAIN, UNSPECIFIED: ICD-10-CM

## 2018-12-20 DIAGNOSIS — Z98.891 HISTORY OF UTERINE SCAR FROM PREVIOUS SURGERY: Chronic | ICD-10-CM

## 2018-12-20 DIAGNOSIS — S92.351A DISPLACED FRACTURE OF FIFTH METATARSAL BONE, RIGHT FOOT, INITIAL ENCOUNTER FOR CLOSED FRACTURE: ICD-10-CM
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
## 2018-12-21 VITALS
HEART RATE: 68 BPM | SYSTOLIC BLOOD PRESSURE: 117 MMHG | TEMPERATURE: 96 F | RESPIRATION RATE: 20 BRPM | OXYGEN SATURATION: 100 % | DIASTOLIC BLOOD PRESSURE: 78 MMHG

## 2018-12-21 DIAGNOSIS — S92.309A FRACTURE OF UNSPECIFIED METATARSAL BONE(S), UNSPECIFIED FOOT, INITIAL ENCOUNTER FOR CLOSED FRACTURE: ICD-10-CM

## 2018-12-21 DIAGNOSIS — R94.31 ABNORMAL ELECTROCARDIOGRAM [ECG] [EKG]: ICD-10-CM

## 2018-12-21 DIAGNOSIS — E03.9 HYPOTHYROIDISM, UNSPECIFIED: ICD-10-CM

## 2018-12-21 DIAGNOSIS — G47.33 OBSTRUCTIVE SLEEP APNEA (ADULT) (PEDIATRIC): ICD-10-CM

## 2018-12-21 DIAGNOSIS — I10 ESSENTIAL (PRIMARY) HYPERTENSION: ICD-10-CM

## 2018-12-21 DIAGNOSIS — R07.9 CHEST PAIN, UNSPECIFIED: ICD-10-CM

## 2018-12-21 DIAGNOSIS — K21.9 GASTRO-ESOPHAGEAL REFLUX DISEASE WITHOUT ESOPHAGITIS: ICD-10-CM
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
## 2018-12-21 LAB
ALBUMIN SERPL ELPH-MCNC: 4.2 G/DL — SIGNIFICANT CHANGE UP (ref 3.5–5.2)
ALP SERPL-CCNC: 101 U/L — SIGNIFICANT CHANGE UP (ref 30–115)
ALT FLD-CCNC: 18 U/L — SIGNIFICANT CHANGE UP (ref 0–41)
ANION GAP SERPL CALC-SCNC: 13 MMOL/L — SIGNIFICANT CHANGE UP (ref 7–14)
APTT BLD: 29.5 SEC — SIGNIFICANT CHANGE UP (ref 27–39.2)
AST SERPL-CCNC: 17 U/L — SIGNIFICANT CHANGE UP (ref 0–41)
BASOPHILS # BLD AUTO: 0.03 K/UL — SIGNIFICANT CHANGE UP (ref 0–0.2)
BASOPHILS NFR BLD AUTO: 0.4 % — SIGNIFICANT CHANGE UP (ref 0–1)
BILIRUB SERPL-MCNC: 0.3 MG/DL — SIGNIFICANT CHANGE UP (ref 0.2–1.2)
BUN SERPL-MCNC: 18 MG/DL — SIGNIFICANT CHANGE UP (ref 10–20)
CALCIUM SERPL-MCNC: 9.4 MG/DL — SIGNIFICANT CHANGE UP (ref 8.5–10.1)
CHLORIDE SERPL-SCNC: 97 MMOL/L — LOW (ref 98–110)
CK MB CFR SERPL CALC: 1.3 NG/ML — SIGNIFICANT CHANGE UP (ref 0.6–6.3)
CK MB CFR SERPL CALC: <1 NG/ML — SIGNIFICANT CHANGE UP (ref 0.6–6.3)
CK SERPL-CCNC: 53 U/L — SIGNIFICANT CHANGE UP (ref 0–225)
CK SERPL-CCNC: 65 U/L — SIGNIFICANT CHANGE UP (ref 0–225)
CO2 SERPL-SCNC: 28 MMOL/L — SIGNIFICANT CHANGE UP (ref 17–32)
CREAT SERPL-MCNC: 0.8 MG/DL — SIGNIFICANT CHANGE UP (ref 0.7–1.5)
EOSINOPHIL # BLD AUTO: 0.08 K/UL — SIGNIFICANT CHANGE UP (ref 0–0.7)
EOSINOPHIL NFR BLD AUTO: 1 % — SIGNIFICANT CHANGE UP (ref 0–8)
GLUCOSE SERPL-MCNC: 113 MG/DL — HIGH (ref 70–99)
HCT VFR BLD CALC: 39.9 % — SIGNIFICANT CHANGE UP (ref 37–47)
HGB BLD-MCNC: 13.5 G/DL — SIGNIFICANT CHANGE UP (ref 12–16)
IMM GRANULOCYTES NFR BLD AUTO: 0.4 % — HIGH (ref 0.1–0.3)
INR BLD: 1.05 RATIO — SIGNIFICANT CHANGE UP (ref 0.65–1.3)
LYMPHOCYTES # BLD AUTO: 1.82 K/UL — SIGNIFICANT CHANGE UP (ref 1.2–3.4)
LYMPHOCYTES # BLD AUTO: 23.1 % — SIGNIFICANT CHANGE UP (ref 20.5–51.1)
MCHC RBC-ENTMCNC: 29.9 PG — SIGNIFICANT CHANGE UP (ref 27–31)
MCHC RBC-ENTMCNC: 33.8 G/DL — SIGNIFICANT CHANGE UP (ref 32–37)
MCV RBC AUTO: 88.5 FL — SIGNIFICANT CHANGE UP (ref 81–99)
MONOCYTES # BLD AUTO: 0.75 K/UL — HIGH (ref 0.1–0.6)
MONOCYTES NFR BLD AUTO: 9.5 % — HIGH (ref 1.7–9.3)
NEUTROPHILS # BLD AUTO: 5.17 K/UL — SIGNIFICANT CHANGE UP (ref 1.4–6.5)
NEUTROPHILS NFR BLD AUTO: 65.6 % — SIGNIFICANT CHANGE UP (ref 42.2–75.2)
NRBC # BLD: 0 /100 WBCS — SIGNIFICANT CHANGE UP (ref 0–0)
PLATELET # BLD AUTO: 211 K/UL — SIGNIFICANT CHANGE UP (ref 130–400)
POTASSIUM SERPL-MCNC: 3.6 MMOL/L — SIGNIFICANT CHANGE UP (ref 3.5–5)
POTASSIUM SERPL-SCNC: 3.6 MMOL/L — SIGNIFICANT CHANGE UP (ref 3.5–5)
PROT SERPL-MCNC: 7.3 G/DL — SIGNIFICANT CHANGE UP (ref 6–8)
PROTHROM AB SERPL-ACNC: 11.4 SEC — SIGNIFICANT CHANGE UP (ref 9.95–12.87)
RBC # BLD: 4.51 M/UL — SIGNIFICANT CHANGE UP (ref 4.2–5.4)
RBC # FLD: 13.9 % — SIGNIFICANT CHANGE UP (ref 11.5–14.5)
SODIUM SERPL-SCNC: 138 MMOL/L — SIGNIFICANT CHANGE UP (ref 135–146)
TROPONIN T SERPL-MCNC: <0.01 NG/ML — SIGNIFICANT CHANGE UP
WBC # BLD: 7.88 K/UL — SIGNIFICANT CHANGE UP (ref 4.8–10.8)
WBC # FLD AUTO: 7.88 K/UL — SIGNIFICANT CHANGE UP (ref 4.8–10.8)

## 2018-12-21 RX ORDER — CHOLECALCIFEROL (VITAMIN D3) 125 MCG
1000 CAPSULE ORAL DAILY
Qty: 0 | Refills: 0 | Status: DISCONTINUED | OUTPATIENT
Start: 2018-12-21 | End: 2018-12-22

## 2018-12-21 RX ORDER — PANTOPRAZOLE SODIUM 20 MG/1
40 TABLET, DELAYED RELEASE ORAL
Qty: 0 | Refills: 0 | Status: DISCONTINUED | OUTPATIENT
Start: 2018-12-21 | End: 2018-12-21

## 2018-12-21 RX ORDER — ACETAMINOPHEN 500 MG
650 TABLET ORAL ONCE
Qty: 0 | Refills: 0 | Status: DISCONTINUED | OUTPATIENT
Start: 2018-12-21 | End: 2018-12-21

## 2018-12-21 RX ORDER — HYDROCHLOROTHIAZIDE 25 MG
25 TABLET ORAL DAILY
Qty: 0 | Refills: 0 | Status: DISCONTINUED | OUTPATIENT
Start: 2018-12-21 | End: 2018-12-22

## 2018-12-21 RX ORDER — LEVOTHYROXINE SODIUM 125 MCG
150 TABLET ORAL
Qty: 0 | Refills: 0 | Status: DISCONTINUED | OUTPATIENT
Start: 2018-12-21 | End: 2018-12-22

## 2018-12-21 RX ORDER — CHLORHEXIDINE GLUCONATE 213 G/1000ML
1 SOLUTION TOPICAL
Qty: 0 | Refills: 0 | Status: DISCONTINUED | OUTPATIENT
Start: 2018-12-21 | End: 2018-12-22

## 2018-12-21 RX ORDER — ONDANSETRON 8 MG/1
4 TABLET, FILM COATED ORAL ONCE
Qty: 0 | Refills: 0 | Status: COMPLETED | OUTPATIENT
Start: 2018-12-21 | End: 2018-12-21

## 2018-12-21 RX ORDER — HEPARIN SODIUM 5000 [USP'U]/ML
5000 INJECTION INTRAVENOUS; SUBCUTANEOUS EVERY 12 HOURS
Qty: 0 | Refills: 0 | Status: DISCONTINUED | OUTPATIENT
Start: 2018-12-21 | End: 2018-12-22

## 2018-12-21 RX ORDER — HYDROCHLOROTHIAZIDE 25 MG
12.5 TABLET ORAL DAILY
Qty: 0 | Refills: 0 | Status: DISCONTINUED | OUTPATIENT
Start: 2018-12-21 | End: 2018-12-21

## 2018-12-21 RX ORDER — ACETAMINOPHEN 500 MG
650 TABLET ORAL EVERY 6 HOURS
Qty: 0 | Refills: 0 | Status: DISCONTINUED | OUTPATIENT
Start: 2018-12-21 | End: 2018-12-22

## 2018-12-21 RX ORDER — ALPRAZOLAM 0.25 MG
0.25 TABLET ORAL THREE TIMES A DAY
Qty: 0 | Refills: 0 | Status: DISCONTINUED | OUTPATIENT
Start: 2018-12-21 | End: 2018-12-22

## 2018-12-21 RX ORDER — ALPRAZOLAM 0.25 MG
0.5 TABLET ORAL ONCE
Qty: 0 | Refills: 0 | Status: DISCONTINUED | OUTPATIENT
Start: 2018-12-21 | End: 2018-12-21

## 2018-12-21 RX ORDER — MULTIVIT-MIN/FERROUS GLUCONATE 9 MG/15 ML
1 LIQUID (ML) ORAL DAILY
Qty: 0 | Refills: 0 | Status: DISCONTINUED | OUTPATIENT
Start: 2018-12-21 | End: 2018-12-22

## 2018-12-21 RX ORDER — LEVOTHYROXINE SODIUM 125 MCG
25 TABLET ORAL DAILY
Qty: 0 | Refills: 0 | Status: DISCONTINUED | OUTPATIENT
Start: 2018-12-21 | End: 2018-12-21

## 2018-12-21 RX ORDER — ASPIRIN/CALCIUM CARB/MAGNESIUM 324 MG
81 TABLET ORAL DAILY
Qty: 0 | Refills: 0 | Status: DISCONTINUED | OUTPATIENT
Start: 2018-12-21 | End: 2018-12-22

## 2018-12-21 RX ORDER — ACETAMINOPHEN 500 MG
325 TABLET ORAL ONCE
Qty: 0 | Refills: 0 | Status: COMPLETED | OUTPATIENT
Start: 2018-12-21 | End: 2018-12-21

## 2018-12-21 RX ADMIN — ONDANSETRON 4 MILLIGRAM(S): 8 TABLET, FILM COATED ORAL at 00:34

## 2018-12-21 RX ADMIN — Medication 325 MILLIGRAM(S): at 00:53

## 2018-12-21 RX ADMIN — Medication 1 TABLET(S): at 11:28

## 2018-12-21 RX ADMIN — Medication 0.25 MILLIGRAM(S): at 10:41

## 2018-12-21 RX ADMIN — Medication 650 MILLIGRAM(S): at 19:23

## 2018-12-21 RX ADMIN — Medication 0.25 MILLIGRAM(S): at 15:38

## 2018-12-21 RX ADMIN — Medication 81 MILLIGRAM(S): at 11:28

## 2018-12-21 RX ADMIN — Medication 1000 UNIT(S): at 11:28

## 2018-12-21 RX ADMIN — Medication 150 MICROGRAM(S): at 06:47

## 2018-12-21 RX ADMIN — Medication 0.5 MILLIGRAM(S): at 03:05

## 2018-12-21 RX ADMIN — Medication 25 MILLIGRAM(S): at 06:47

## 2018-12-21 RX ADMIN — Medication 0.25 MILLIGRAM(S): at 21:48

## 2018-12-21 NOTE — ED PROVIDER NOTE - CARE PLAN
Principal Discharge DX:	Fall  Secondary Diagnosis:	Chest pain  Secondary Diagnosis:	Metatarsal fracture  Secondary Diagnosis:	Ankle sprain

## 2018-12-21 NOTE — ED PROVIDER NOTE - MEDICAL DECISION MAKING DETAILS
Pt has metatarsal fx on xray. ECG shows some st depressions (minimal in II, III). Pt needs ACS work-up. Will admit for further eval and tx.

## 2018-12-21 NOTE — ED PROVIDER NOTE - OBJECTIVE STATEMENT
67 yo f pmhx thyroid disorder, htn presents s/p mechanical fall injuring her R ankle. pt denies injury or trauma elsewhere, no anticoagulation, no head injury, no loc. no weakness, numbness, tingling. pt states after pain became very severe she felt a bit nauseous. no chest pain, no sob.

## 2018-12-21 NOTE — CONSULT NOTE ADULT - SUBJECTIVE AND OBJECTIVE BOX
Patient is a 68y old  Female who presents with a chief complaint of abnormal EKG (21 Dec 2018 04:50)    Patient seen at bedside with , Dr. Goel, and Physiatry. Patient relates that she tripped getting out of bed to help her . Patient denies following a podiatrist but the  relates that he know Dr. Duffy. Patient relates that her and her  see an ortho team in [place] regularly. Patient relates that she is unable to use crutches but does not have any issues with using a walker. Patient has no other pedal complaints at this time.      HPI:  67yo female presents to the ER following a fall. Associated symptoms included nausea, sweating and vague left sided chest pain to her axillary area. That pain is worse with palpation. Patient's history includes arthritis to neck (cervical region), MVP, RF but claims she has always been told her cardiogram is normal (21 Dec 2018 04:50)          PAST MEDICAL & SURGICAL HISTORY:  History of palpitations  ETHAN on CPAP  Migraines  Cataract  Gastroesophageal reflux disease  Hypothyroid  Hypertension  Cataract extraction status of eye, left  H/O knee surgery: torn minsicus repair b/l knee  H/O:       Allergies    codeine (Vomiting; Nausea)  cortisone (Other; Rash; Pruritus)  latex (Pruritus; Rash)  lidocaine topical (Rash)  penicillin (Red Man Synd; Rash; Pruritus)  predniSONE (Other; Rash)    Intolerances        MEDICATIONS  (STANDING):  ALPRAZolam 0.25 milliGRAM(s) Oral three times a day  aspirin  chewable 81 milliGRAM(s) Oral daily  chlorhexidine 2% Cloths 1 Application(s) Topical <User Schedule>  chlorhexidine 4% Liquid 1 Application(s) Topical <User Schedule>  cholecalciferol 1000 Unit(s) Oral daily  heparin  Injectable 5000 Unit(s) SubCutaneous every 12 hours  hydrochlorothiazide 25 milliGRAM(s) Oral daily  levothyroxine 150 MICROGram(s) Oral <User Schedule>  multivitamin/minerals 1 Tablet(s) Oral daily    MEDICATIONS  (PRN):  acetaminophen   Tablet .. 650 milliGRAM(s) Oral every 6 hours PRN Mild Pain (1 - 3)      FAMILY HISTORY:  No pertinent family history in first degree relatives      SOCIAL HISTORY: non-smoker    REVIEW OF SYSTEMS:    PHYSICAL EXAM:    Vital Signs Last 24 Hrs  T(C): 36.1 (21 Dec 2018 05:00), Max: 36.1 (21 Dec 2018 05:00)  T(F): 97 (21 Dec 2018 05:00), Max: 97 (21 Dec 2018 05:00)  HR: 68 (21 Dec 2018 05:00) (68 - 68)  BP: 129/63 (21 Dec 2018 05:00) (117/78 - 129/63)  BP(mean): --  RR: 18 (21 Dec 2018 05:00) (18 - 20)  SpO2: 100% (21 Dec 2018 00:06) (100% - 100%)    Patient seen at bedside. Patient NAD and AAOx3.  Vascular: DP/PT pulses palpable bilaterally. CFT <3 seconds to all digits bilaterally. Skin temperature warm to cool from proximal to distal bilaterally.   Neurologic: Light touch sensation intact bilaterally.  Musculoskeletal: Pain to palpation at level of right 5th metatarsal  Dermatological: no discoloration noted to the right foot at this time. Mild edema noted to area overlying the 5th metatarsal and to the perimalleolar area.       LABS:                          13.5   7.88  )-----------( 211      ( 21 Dec 2018 02:15 )             39.9                   12-    138  |  97<L>  |  18  ----------------------------<  113<H>  3.6   |  28  |  0.8    Ca    9.4      21 Dec 2018 02:15    TPro  7.3  /  Alb  4.2  /  TBili  0.3  /  DBili  x   /  AST  17  /  ALT  18  /  AlkPhos  101  12-          PT/INR - ( 21 Dec 2018 02:15 )   PT: 11.40 sec;   INR: 1.05 ratio         PTT - ( 21 Dec 2018 02:15 )  PTT:29.5 sec            A:  5th metatarsal fracture, displaced    P:  Patient examined and evaluated.   Recommend Patient to remain in posterior splint at this time  Discussed possible need for surgical intervention but patient appeared apprehensive at this time to have it done by anyone but the ortho team she knows in [place]   Recommend NWB to right foot   Patient can follow up with ortho or podiatry as OP  Will f/u with attending for any further recommendations.

## 2018-12-21 NOTE — CHART NOTE - NSCHARTNOTEFT_GEN_A_CORE
68 year old female admitted with a right metatarsal fracture and avulsion injury to the lateral malleolus s/p fall.  She is non weight bearing which has caused mobility limitation that significantly impairs the patients ability to participate in one or more MRADL's such as: toileting, eating, dressing and bathing in customary locations in the home.  Patient's home provides adequate access between rooms for the use of the manual wheelchair.  The wheelchair will significantly improve patient's ability to participate in MRADL's and will be used on a regular basis in the home. The patient's mobility limitation cannot be resolved by the use of a walker or cane.  This patient has sufficient upper extremity function to safely propel the manual wheelchair in the home during a typical day.   The patient has agreed to use the wheelchair on a daily basis in the home.    I discussed the care with pt's   - Asher - [phone] - he asked that Dr. Estrada see his wife for surgery.  Contacted Dr. Estrada - he is aware [phone] and will see the patient.  Also contacted ortho b/c the patient also has a malleolar injury.

## 2018-12-21 NOTE — CONSULT NOTE ADULT - SUBJECTIVE AND OBJECTIVE BOX
HPI: 67 yo female presents to the ER following a fall. Associated symptoms included nausea, sweating and vague left sided chest pain to her axillary area. That pain is worse with palpation. Patient's history includes arthritis to neck (cervical region), MVP, RF but claims she has always been told her cardiogram is normal (21 Dec 2018 04:50)    PTN  REFERRED TO ACUTE  REHAB  FOR  EVAL AND  TX   PAST MEDICAL & SURGICAL HISTORY:  History of palpitations  ETHAN on CPAP  Migraines  Cataract  Gastroesophageal reflux disease  Hypothyroid  Hypertension  Cataract extraction status of eye, left  H/O knee surgery: torn minsicus repair b/l knee  H/O:       Hospital Course:    TODAY'S SUBJECTIVE & REVIEW OF SYMPTOMS:     Constitutional WNL   Cardio WNL   Resp WNL   GI WNL  Heme WNL  Endo WNL  Skin WNL  MSK WNL  Neuro WNL  Cognitive WNL  Psych WNL      MEDICATIONS  (STANDING):  ALPRAZolam 0.25 milliGRAM(s) Oral three times a day  aspirin  chewable 81 milliGRAM(s) Oral daily  chlorhexidine 2% Cloths 1 Application(s) Topical <User Schedule>  chlorhexidine 4% Liquid 1 Application(s) Topical <User Schedule>  cholecalciferol 1000 Unit(s) Oral daily  heparin  Injectable 5000 Unit(s) SubCutaneous every 12 hours  hydrochlorothiazide 25 milliGRAM(s) Oral daily  levothyroxine 150 MICROGram(s) Oral <User Schedule>  multivitamin/minerals 1 Tablet(s) Oral daily    MEDICATIONS  (PRN):  acetaminophen   Tablet .. 650 milliGRAM(s) Oral every 6 hours PRN Mild Pain (1 - 3)      FAMILY HISTORY:  No pertinent family history in first degree relatives      Allergies    codeine (Vomiting; Nausea)  cortisone (Other; Rash; Pruritus)  latex (Pruritus; Rash)  lidocaine topical (Rash)  penicillin (Red Man Synd; Rash; Pruritus)  predniSONE (Other; Rash)    Intolerances        SOCIAL HISTORY:    [  ] Etoh  [  ] Smoking  [  ] Substance abuse     Home Environment:  [  ] Home Alone  [ x ] Lives with Family  [  ] Home Health Aid    Dwelling:  [  ] Apartment  [ x ] Private House  [  ] Adult Home  [  ] Skilled Nursing Facility      [  ] Short Term  [  ] Long Term  [ x ] Stairs       Elevator [  ]    FUNCTIONAL STATUS PTA: (Check all that apply)  Ambulation: [ x  ]Independent    [  ] Dependent     [  ] Non-Ambulatory  Assistive Device: [  ] SA Cane  [  ]  Q Cane  [  ] Walker  [  ]  Wheelchair  ADL : [x  ] Independent  [  ]  Dependent       Vital Signs Last 24 Hrs  T(C): 36.1 (21 Dec 2018 05:00), Max: 36.1 (21 Dec 2018 05:00)  T(F): 97 (21 Dec 2018 05:00), Max: 97 (21 Dec 2018 05:00)  HR: 68 (21 Dec 2018 05:00) (68 - 68)  BP: 129/63 (21 Dec 2018 05:00) (117/78 - 129/63)  BP(mean): --  RR: 18 (21 Dec 2018 05:00) (18 - 20)  SpO2: 100% (21 Dec 2018 00:06) (100% - 100%)      PHYSICAL EXAM: Alert & Oriented X3  GENERAL: NAD, well-groomed, well-developed  HEAD:  Atraumatic, Normocephalic  EYES: EOMI, PERRLA, conjunctiva and sclera clear  NECK: Supple, No JVD, Normal thyroid  CHEST/LUNG: Clear to percussion bilaterally; No rales, rhonchi, wheezing, or rubs  HEART: Regular rate and rhythm; No murmurs, rubs, or gallops  ABDOMEN: Soft, Nontender, Nondistended; Bowel sounds present  EXTREMITIES:  2+ Peripheral Pulses, No clubbing, cyanosis, or edema    NERVOUS SYSTEM:  Cranial Nerves 2-12 intact [  x] Abnormal  [  ]  ROM: WFL all extremities [  ]  Abnormal [rt  le  in splint   ]  Motor Strength: WFL all extremities  [ x ]  Abnormal [  ]  Sensation: intact to light touch [x ] Abnormal [  ]  Reflexes: Symmetric [ x ]  Abnormal [  ]    FUNCTIONAL STATUS:  Bed Mobility: Independent [  ]  Supervision [  ]  Needs Assistance [x  ]  N/A [  ]  Transfers: Independent [  ]  Supervision [  ]  Needs Assistance [ x ]  N/A [  ]   Ambulation: Independent [  ]  Supervision [  ]  Needs Assistance [ x ]  N/A [  ]  ADL: Independent [  ] Requires Assistance [  x] N/A [  ]      LABS:                        13.5   7.88  )-----------( 211      ( 21 Dec 2018 02:15 )             39.9     12-21    138  |  97<L>  |  18  ----------------------------<  113<H>  3.6   |  28  |  0.8    Ca    9.4      21 Dec 2018 02:15    TPro  7.3  /  Alb  4.2  /  TBili  0.3  /  DBili  x   /  AST  17  /  ALT  18  /  AlkPhos  101  12-21    PT/INR - ( 21 Dec 2018 02:15 )   PT: 11.40 sec;   INR: 1.05 ratio    < from: Xray Ankle Complete 3 Views, Right (.18 @ 01:36) >  *  There is evidence of avulsion injury of tip of the lateral malleolus.   *  Mild soft tissue swelling around ankle.  *  Heel spur.      < end of copied text >       PTT - ( 21 Dec 2018 02:15 )  PTT:29.5 sec      RADIOLOGY & ADDITIONAL STUDIES:    Assesment:

## 2018-12-21 NOTE — PHYSICAL THERAPY INITIAL EVALUATION ADULT - TRANSFER SAFETY CONCERNS NOTED: SIT/STAND, REHAB EVAL
Request refill. Last OV 1/5/17   decreased sequencing ability/inability to maintain weight-bearing restrictions w/o assist/decreased weight-shifting ability

## 2018-12-21 NOTE — H&P ADULT - NSHPLABSRESULTS_GEN_ALL_CORE
EKG-, non specific T wave inversions to V2                          13.5   7.88  )-----------( 211      ( 21 Dec 2018 02:15 )             39.9     12-21    138  |  97<L>  |  18  ----------------------------<  113<H>  3.6   |  28  |  0.8    Ca    9.4      21 Dec 2018 02:15    TPro  7.3  /  Alb  4.2  /  TBili  0.3  /  DBili  x   /  AST  17  /  ALT  18  /  AlkPhos  101  12-21            PT/INR - ( 21 Dec 2018 02:15 )   PT: 11.40 sec;   INR: 1.05 ratio         PTT - ( 21 Dec 2018 02:15 )  PTT:29.5 sec  Lactate Trend    CARDIAC MARKERS ( 21 Dec 2018 02:15 )  x     / <0.01 ng/mL / x     / x     / x          CAPILLARY BLOOD GLUCOSE

## 2018-12-21 NOTE — PHYSICAL THERAPY INITIAL EVALUATION ADULT - GENERAL OBSERVATIONS, REHAB EVAL
08:25-08:50 Chart reviewed. Pt encountered sitting at edge of bed,  may be seen by Physical Therapist as confirmed with Nurse. Patient denied pain at rest and would like to walk but not able to keep (R) foot off the floor;+splint Ace wrap RLE

## 2018-12-21 NOTE — ED POST DISCHARGE NOTE - RESULT SUMMARY
R ANKLE- AVULSION LATERAL MALLEOLUS. SPOKE WITH DR. SOMERS ON THIS ADMITTED PATIENT. SHE WILL F/U WITH THIS PATIENT.

## 2018-12-21 NOTE — ED PROVIDER NOTE - PHYSICAL EXAMINATION
CONSTITUTIONAL: Well-developed; well-nourished; speaking in full sentences  SKIN: warm, dry  HEAD: Normocephalic; atraumatic  EYES: PERRL, EOMI, no conjunctival erythema  ENT: No nasal discharge; airway clear, mucous membranes moist  NECK: Supple; non tender, FROM  CARD: +S1, S2 no murmurs, gallops, or rubs. Regular rate and rhythm. radial 2+  RESP: No wheezes, rales or rhonchi. CTABL  ABD: soft ntnd, no rebound, no guarding, no rigidity  EXT: moves all extremities, R ankle TTP with effusion over anterolateral aspect and ttp lateral malleoli, however pt is able to move ankle joint, toes and knee. no calf tenderness, les equal in color. dp 2+   NEURO: Alert, oriented, grossly unremarkable, no focal deficits, cn ii-xii grossly intact, le sensation grossly intaact  PSYCH: Cooperative, appropriate

## 2018-12-21 NOTE — H&P ADULT - HISTORY OF PRESENT ILLNESS
67yo female presents to the ER following a fall. Associated symptoms included nausea, sweating and vague left sided chest pain to her axillary area. That pain is worse with palpation. Patient's history includes arthritis to neck (cervical region), MVP, RF but claims she has always been told her cardiogram is normal

## 2018-12-21 NOTE — CONSULT NOTE ADULT - ASSESSMENT
Patient with history as above. chronic shoulder back pain. EKG nsst changes. No evidence mi. Can check enzymes . ECHO. Rx toe

## 2018-12-21 NOTE — ED PROVIDER NOTE - NS ED ROS FT
General: No fevers, chills, vomiting  Eyes:  No visual changes, eye pain or discharge.  ENMT:  No hearing changes, pain,   Cardiac:  No chest pain, SOB or edema.  Respiratory:  No cough or respiratory distress.   GI:  No vomiting, or abdominal pain.  :  No dysuria,  MS:  +R ankle pain  Neuro:  .  No LOC.  Skin:  No skin rash.   Endocrine: +history of thyroid disease

## 2018-12-21 NOTE — CONSULT NOTE ADULT - ASSESSMENT
IMPRESSION: Rehab of 69 y/o  f  rehab  for  GD rt  ankle sprian      PRECAUTIONS: [  ] Cardiac  [  ] Respiratory  [  ] Seizures [  ] Contact Isolation  [  ] Droplet Isolation  [ nwb  rt le  ] Other    Weight Bearing Status:     RECOMMENDATION:    Out of Bed to Chair     DVT/Decubiti Prophylaxis    REHAB PLAN:     [ x ] Bedside P/T 3-5 times a week   [   ]   Bedside O/T  2-3 times a week             [   ] No Rehab Therapy Indicated                   [   ]  Speech Therapy   Conditioning/ROM                                    ADL  Bed Mobility                                               Conditioning/ROM  Transfers                                                     Bed Mobility  Sitting /Standing Balance                         Transfers                                        Gait Training                                               Sitting/Standing Balance  Stair Training [   ]Applicable                    Home equipment Eval                                                                        Splinting  [   ] Only      GOALS:   ADL   [  x ]   Independent                    Transfers  [  x ] Independent                          Ambulation  [ x  ] Independent     [    ] With device                            [  x ]  CG                                                         [   ]  CG                                                                  [   ] CG                            [    ] Min A                                                   [   ] Min A                                                              [   ] Min  A          DISCHARGE PLAN:   [   ]  Good candidate for Intensive Rehabilitation/Hospital based-4A [facility]                                             Will tolerate 3hrs Intensive Rehab Daily                                       [    ]  Short Term Rehab in Skilled Nursing Facility                                       [   xx ]  Home with Outpatient or VN services    ptn refused STR  ortho  f/u  pain med                                       [    ]  Possible Candidate for Intensive Hospital based Rehab

## 2018-12-21 NOTE — ED PROVIDER NOTE - ATTENDING CONTRIBUTION TO CARE
I personally evaluated the patient. I reviewed the Resident’s or Physician Assistant’s note (as assigned above), and agree with the findings and plan except as documented in my note.    67 yo f pmhx thyroid disorder, htn presents s/p mechanical fall injuring her R ankle. Patient is also complaining of intermittent diaphoresis over the past month associated with left sided arm and chest pain. Patient states she is awaiting CCTA to be scheduled by her cardiologist in [place]. No SOB. No PE or DVT risk factors.     A/P: CXR, r foot/ankle xray, labs, ECG, reassess

## 2018-12-21 NOTE — ED PROVIDER NOTE - PROGRESS NOTE DETAILS
after splinting fracture, pt recalled intermittent chest pain over the past month. ekg done, first trop negative. will admit, case d/w julio

## 2018-12-21 NOTE — CONSULT NOTE ADULT - ATTENDING COMMENTS
If patient opts for non-operative management of fracture, she must remain non weightbearing for 6-8 weeks

## 2018-12-21 NOTE — CONSULT NOTE ADULT - SUBJECTIVE AND OBJECTIVE BOX
CARDIOLOGY CONSULT NOTE     CHIEF COMPLAINT/REASON FOR CONSULT:    HPI:  69yo female presents to the ER following a fall. Associated symptoms included nausea, sweating and vague left sided chest pain to her axillary area. That pain is worse with palpation. Patient's history includes arthritis to neck (cervical region), MVP, RF but claims she has always been told her cardiogram is normal (21 Dec 2018 04:50)      PAST MEDICAL & SURGICAL HISTORY:  History of palpitations  ETHAN on CPAP  Migraines  Cataract  Gastroesophageal reflux disease  Hypothyroid  Hypertension  Cataract extraction status of eye, left  H/O knee surgery: torn minsicus repair b/l knee  H/O:       Cardiac Risks:   [x ]HTN, [ ] DM, [ ] Smoking, [x ] FH,  [ ] Lipids        MEDICATIONS:  MEDICATIONS  (STANDING):  ALPRAZolam 0.25 milliGRAM(s) Oral three times a day  aspirin  chewable 81 milliGRAM(s) Oral daily  chlorhexidine 2% Cloths 1 Application(s) Topical <User Schedule>  chlorhexidine 4% Liquid 1 Application(s) Topical <User Schedule>  cholecalciferol 1000 Unit(s) Oral daily  heparin  Injectable 5000 Unit(s) SubCutaneous every 12 hours  hydrochlorothiazide 25 milliGRAM(s) Oral daily  levothyroxine 150 MICROGram(s) Oral <User Schedule>  multivitamin/minerals 1 Tablet(s) Oral daily      FAMILY HISTORY:  No pertinent family history in first degree relatives      SOCIAL HISTORY:      [ ] Marital status    Allergies    codeine (Vomiting; Nausea)  cortisone (Other; Rash; Pruritus)  latex (Pruritus; Rash)  lidocaine topical (Rash)  penicillin (Red [place] Synd; Rash; Pruritus)  predniSONE (Other; Rash)        	    REVIEW OF SYSTEMS:  CONSTITUTIONAL: No fever, weight loss, or fatigue  EYES: No eye pain, visual disturbances, or discharge  ENMT:  No difficulty hearing, tinnitus, vertigo; No sinus or throat pain  NECK: No pain or stiffness  RESPIRATORY: No cough, wheezing, chills or hemoptysis; No Shortness of Breath  CARDIOVASCULAR: No chest pain, palpitations, passing out, dizziness, or leg swelling  GASTROINTESTINAL: No abdominal or epigastric pain. No nausea, vomiting, or hematemesis; No diarrhea or constipation. No melena or hematochezia.  GENITOURINARY: No dysuria, frequency, hematuria, or incontinence  NEUROLOGICAL: No headaches, memory loss, loss of strength, numbness, or tremors  SKIN: No itching, burning, rashes, or lesions   	      PHYSICAL EXAM:  T(C): 36.1 (18 @ 05:00), Max: 36.1 (18 @ 05:00)  HR: 68 (18 @ 05:00) (68 - 68)  BP: 129/63 (18 @ 05:00) (117/78 - 129/63)  RR: 18 (18 @ 05:00) (18 - 20)  SpO2: 100% (18 @ 00:06) (100% - 100%)  Wt(kg): --  I&O's Summary      Appearance: Normal	  Psychiatry: A & O x 3, Mood & affect appropriate  HEENT:   Normal oral mucosa, PERRL, EOMI	  Lymphatic: No lymphadenopathy  Cardiovascular: Normal S1 S2,RRR, No JVD, No murmurs  Respiratory: Lungs clear to auscultation	  Gastrointestinal:  Soft, Non-tender, + BS	  Skin: No rashes, No ecchymoses, No cyanosis	  Neurologic: Non-focal  Extremities: Normal range of motion, No clubbing, cyanosis or edema  Vascular: Peripheral pulses palpable 2+ bilaterally      ECG:  	nsr nsst     	  LABS:	 	    CARDIAC MARKERS:                                    13.5   7.88  )-----------( 211      ( 21 Dec 2018 02:15 )             39.9         138  |  97<L>  |  18  ----------------------------<  113<H>  3.6   |  28  |  0.8    Ca    9.4      21 Dec 2018 02:15    TPro  7.3  /  Alb  4.2  /  TBili  0.3  /  DBili  x   /  AST  17  /  ALT  18  /  AlkPhos  101  12-    PT/INR - ( 21 Dec 2018 02:15 )   PT: 11.40 sec;   INR: 1.05 ratio         PTT - ( 21 Dec 2018 02:15 )  PTT:29.5 sec

## 2018-12-22 ENCOUNTER — TRANSCRIPTION ENCOUNTER (OUTPATIENT)
Age: 68
End: 2018-12-22

## 2018-12-22 VITALS
DIASTOLIC BLOOD PRESSURE: 65 MMHG | RESPIRATION RATE: 16 BRPM | SYSTOLIC BLOOD PRESSURE: 108 MMHG | HEART RATE: 67 BPM | TEMPERATURE: 96 F

## 2018-12-22 RX ADMIN — Medication 0.25 MILLIGRAM(S): at 05:14

## 2018-12-22 RX ADMIN — Medication 81 MILLIGRAM(S): at 11:29

## 2018-12-22 RX ADMIN — Medication 1 TABLET(S): at 11:29

## 2018-12-22 RX ADMIN — Medication 150 MICROGRAM(S): at 05:14

## 2018-12-22 RX ADMIN — Medication 1000 UNIT(S): at 11:29

## 2018-12-22 RX ADMIN — Medication 25 MILLIGRAM(S): at 05:14

## 2018-12-22 NOTE — CONSULT NOTE ADULT - ASSESSMENT
69 yo F with right ankle sprain and 5th metatarsal shaft fracture  -rec heel wb in short CAM boot  -elevate, ice, pain control  -walker as assistive device  -she has a follow up with her own orthopedic surgeon on january 4th.

## 2018-12-22 NOTE — DISCHARGE NOTE ADULT - HOSPITAL COURSE
67 y/o f presented with chest pain and foot pain after a trauma at home. Found to have a 5th metatarsal fx. Pt seen by podiatry  and placed in splint. Pt seen by ortho and recommend fu outpt ortho and short cam boot. Pt seen by cardiology recommended CE trend. CE x3 neg, no events on tele. Chest pain likely MSK. Follow up outpt.

## 2018-12-22 NOTE — DISCHARGE NOTE ADULT - PATIENT PORTAL LINK FT
You can access the [facility], offered by [facility], by registering with the following website: [email or web address]

## 2018-12-22 NOTE — DISCHARGE NOTE ADULT - CARE PLAN
Principal Discharge DX:	Fall, initial encounter  Secondary Diagnosis:	Chest pain, unspecified type Principal Discharge DX:	Closed displaced fracture of fifth metatarsal bone of right foot, initial encounter  Goal:	stable  Assessment and plan of treatment:	You were admitted for chest pain and foot pain. You were found to have a fracture in your right foot. You were seen by our Podiatrist who splinted your leg. Orthopedics saw you and recommended a Short Cam walker boot.  They recommended Surgery which you declined.  Please follow up with your orthopedics in [place].   You may use your wheel chair at home and Please also follow up with Dr. Estrada as outpt.  Secondary Diagnosis:	Chest pain, unspecified type  Goal:	stable  Assessment and plan of treatment:	You were also admitted Principal Discharge DX:	Closed displaced fracture of fifth metatarsal bone of right foot, initial encounter  Goal:	stable  Assessment and plan of treatment:	You were admitted for chest pain and foot pain. You were found to have a fracture in your right foot. You were seen by our Podiatrist who splinted your leg. Orthopedics saw you and recommended a Short Cam walker boot.  They recommended Surgery which you declined.  Please follow up with your orthopedics in [place].   You may use your wheel chair at home and Please also follow up with Dr. Estrada as outpt.  Secondary Diagnosis:	Chest pain, unspecified type  Goal:	stable  Assessment and plan of treatment:	You were also admitted for chest pain. Your work up was negative. Your pain was likely a muscle pain. Can take OTC tylenol. Follow up with PCP.

## 2018-12-22 NOTE — DISCHARGE NOTE ADULT - PRINCIPAL DIAGNOSIS
Fall, initial encounter Closed displaced fracture of fifth metatarsal bone of right foot, initial encounter

## 2018-12-22 NOTE — CONSULT NOTE ADULT - SUBJECTIVE AND OBJECTIVE BOX
69 yo F admitted with abnormal EKG s/p fall from standing height.  Pt complaining of right foot and ankle pain.  splint placed in ER.  No prior complaints of pain in foot or ankle.  Denies N/T.  No open injuries.  Orthopedics consulted.    PAST MEDICAL & SURGICAL HISTORY:  History of palpitations  ETHAN on CPAP  Migraines  Cataract  Gastroesophageal reflux disease  Hypothyroid  Hypertension  Cataract extraction status of eye, left  H/O knee surgery: torn minsicus repair b/l knee  H/O:     Home Medications:  butalbital: 2 times a day, As Needed (09 Sep 2018 15:16)  Emergen-C oral powder for reconstitution:  (09 Sep 2018 15:16)  hydroCHLOROthiazide:  (09 Sep 2018 15:16)  NexIUM:  (09 Sep 2018 15:16)  Synthroid:  (09 Sep 2018 15:16)  Vitamin D3: 2 tab(s) orally once a day (09 Sep 2018 15:16)  Xanax 0.25 mg oral tablet: 1 tab(s) orally 3 times a day (09 Sep 2018 15:16)    Allergies    codeine (Vomiting; Nausea)  cortisone (Other; Rash; Pruritus)  latex (Pruritus; Rash)  lidocaine topical (Rash)  penicillin (Red Man Synd; Rash; Pruritus)  predniSONE (Other; Rash)    Intolerances    FHx: N/A    Social Hx: nonsmoker    ROS: right foot and ankle pain. aside from this no complaints.  14 point review of systems obtained and negative per patient.    PE:  AFVSS, NAD, AAOx3  splint in place  toes wwp with brisk refill  silt throughout toes  5/5 EHL/FHL  no calf tenderness.  +TTP over lateral foot through padding on posterior splint.    Imaging:  xrays of ankle show small avulsion off lateral malleolus consistent with ankle sprain  xrays of foot show 5th metatarsal shaft fracture with some displacement

## 2018-12-22 NOTE — PROGRESS NOTE ADULT - SUBJECTIVE AND OBJECTIVE BOX
Pt seen and examined at bedside. No CP or SOB.     PAST MEDICAL & SURGICAL HISTORY:  History of palpitations  ETHAN on CPAP  Migraines  Cataract  Gastroesophageal reflux disease  Hypothyroid  Hypertension  Cataract extraction status of eye, left  H/O knee surgery: torn minsicus repair b/l knee  H/O:       VITAL SIGNS (Last 24 hrs):  T(C): 35.8 (18 @ 04:59), Max: 36.2 (18 @ 21:03)  HR: 67 (18 @ 04:59) (67 - 70)  BP: 108/65 (18 @ 04:59) (108/65 - 119/58)  RR: 16 (18 @ 04:59) (16 - 18)  SpO2: --  Wt(kg): --  Daily     Daily     I&O's Summary      PHYSICAL EXAM:  GENERAL: NAD, well-developed  HEAD:  Atraumatic, Normocephalic  EYES: EOMI, PERRLA, conjunctiva and sclera clear  NECK: Supple, No JVD  CHEST/LUNG: Clear to auscultation bilaterally; No wheeze  HEART: Regular rate and rhythm; No murmurs, rubs, or gallops  ABDOMEN: Soft, Nontender, Nondistended; Bowel sounds present  EXTREMITIES:  2+ Peripheral Pulses, No clubbing, cyanosis, or edema, right ankle fx in splint, pulses present   PSYCH: AAOx3  NEUROLOGY: non-focal  SKIN: No rashes or lesions    Labs Reviewed  Spoke to patient in regards to abnormal labs.    CBC Full  -  ( 21 Dec 2018 02:15 )  WBC Count : 7.88 K/uL  Hemoglobin : 13.5 g/dL  Hematocrit : 39.9 %  Platelet Count - Automated : 211 K/uL  Mean Cell Volume : 88.5 fL  Mean Cell Hemoglobin : 29.9 pg  Mean Cell Hemoglobin Concentration : 33.8 g/dL  Auto Neutrophil # : 5.17 K/uL  Auto Lymphocyte # : 1.82 K/uL  Auto Monocyte # : 0.75 K/uL  Auto Eosinophil # : 0.08 K/uL  Auto Basophil # : 0.03 K/uL  Auto Neutrophil % : 65.6 %  Auto Lymphocyte % : 23.1 %  Auto Monocyte % : 9.5 %  Auto Eosinophil % : 1.0 %  Auto Basophil % : 0.4 %    BMP:     @ 02:15    Blood Urea Nitrogen - 18  Calcium - 9.4  Carbond Dioxide - 28  Chloride - 97  Creatinine - 0.8  Glucose - 113  Potassium - 3.6  Sodium - 138      Hemoglobin A1c -   PT/INR - ( 21 Dec 2018 02:15 )   PT: 11.40 sec;   INR: 1.05 ratio         PTT - ( 21 Dec 2018 02:15 )  PTT:29.5 sec  Urine Culture:        Imaging reviewed      MEDICATIONS  (STANDING):  ALPRAZolam 0.25 milliGRAM(s) Oral three times a day  aspirin  chewable 81 milliGRAM(s) Oral daily  chlorhexidine 2% Cloths 1 Application(s) Topical <User Schedule>  chlorhexidine 4% Liquid 1 Application(s) Topical <User Schedule>  cholecalciferol 1000 Unit(s) Oral daily  heparin  Injectable 5000 Unit(s) SubCutaneous every 12 hours  hydrochlorothiazide 25 milliGRAM(s) Oral daily  levothyroxine 150 MICROGram(s) Oral <User Schedule>  multivitamin/minerals 1 Tablet(s) Oral daily    MEDICATIONS  (PRN):  acetaminophen   Tablet .. 650 milliGRAM(s) Oral every 6 hours PRN Mild Pain (1 - 3)

## 2018-12-22 NOTE — DISCHARGE NOTE ADULT - PLAN OF CARE
stable You were admitted for chest pain and foot pain. You were found to have a fracture in your right foot. You were seen by our Podiatrist who splinted your leg. Orthopedics saw you and recommended a Short Cam walker boot.  They recommended Surgery which you declined.  Please follow up with your orthopedics in [place].   You may use your wheel chair at home and Please also follow up with Dr. Estrada as outpt. You were also admitted You were also admitted for chest pain. Your work up was negative. Your pain was likely a muscle pain. Can take OTC tylenol. Follow up with PCP.

## 2018-12-22 NOTE — PROGRESS NOTE ADULT - ASSESSMENT
69 y/o f presented with chest pain and foot pain after a trauma at home. Found to have a 5th metatarsal fx. Pt seen by podiatry  and placed in splint. Pt seen by ortho and recommend fu outpt ortho and short cam boot. Pt seen by cardiology recommended CE trend. CE x3 neg, no events on tele. Chest pain likely MSK. Follow up outpt.

## 2018-12-22 NOTE — DISCHARGE NOTE ADULT - MEDICATION SUMMARY - MEDICATIONS TO TAKE
I will START or STAY ON the medications listed below when I get home from the hospital:    butalbital  -- 2 times a day, As Needed  -- Indication: For Home med    Xanax 0.25 mg oral tablet  -- 1 tab(s) by mouth 3 times a day  -- Indication: For Home med    hydroCHLOROthiazide  -- Indication: For Htn    NexIUM  -- Indication: For Home med    Synthroid  -- Indication: For Hypothyroid    Emergen-C oral powder for reconstitution  -- Indication: For Home med    Vitamin D3  -- 2 tab(s) by mouth once a day  -- Indication: For vit d deff

## 2018-12-29 ENCOUNTER — EMERGENCY (EMERGENCY)
Facility: HOSPITAL | Age: 68
LOS: 0 days | Discharge: HOME | End: 2018-12-29
Attending: EMERGENCY MEDICINE | Admitting: EMERGENCY MEDICINE

## 2018-12-29 VITALS
DIASTOLIC BLOOD PRESSURE: 86 MMHG | RESPIRATION RATE: 18 BRPM | TEMPERATURE: 97 F | HEART RATE: 74 BPM | WEIGHT: 197.09 LBS | SYSTOLIC BLOOD PRESSURE: 121 MMHG | HEIGHT: 68 IN | OXYGEN SATURATION: 100 %
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
## 2018-12-29 DIAGNOSIS — Z98.891 HISTORY OF UTERINE SCAR FROM PREVIOUS SURGERY: Chronic | ICD-10-CM

## 2018-12-29 DIAGNOSIS — K21.9 GASTRO-ESOPHAGEAL REFLUX DISEASE WITHOUT ESOPHAGITIS: ICD-10-CM
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
## 2018-12-29 DIAGNOSIS — Z98.42 CATARACT EXTRACTION STATUS, LEFT EYE: Chronic | ICD-10-CM
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
## 2018-12-29 DIAGNOSIS — I10 ESSENTIAL (PRIMARY) HYPERTENSION: ICD-10-CM
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
## 2018-12-29 DIAGNOSIS — Z79.899 OTHER LONG TERM (CURRENT) DRUG THERAPY: ICD-10-CM

## 2018-12-29 DIAGNOSIS — M25.579 PAIN IN UNSPECIFIED ANKLE AND JOINTS OF UNSPECIFIED FOOT: ICD-10-CM

## 2018-12-29 DIAGNOSIS — E03.9 HYPOTHYROIDISM, UNSPECIFIED: ICD-10-CM

## 2018-12-29 DIAGNOSIS — Z98.890 OTHER SPECIFIED POSTPROCEDURAL STATES: Chronic | ICD-10-CM

## 2018-12-29 DIAGNOSIS — Z88.5 ALLERGY STATUS TO NARCOTIC AGENT: ICD-10-CM

## 2018-12-29 DIAGNOSIS — S90.01XA CONTUSION OF RIGHT ANKLE, INITIAL ENCOUNTER: ICD-10-CM

## 2018-12-29 DIAGNOSIS — Z88.0 ALLERGY STATUS TO PENICILLIN: ICD-10-CM

## 2018-12-29 DIAGNOSIS — S80.11XA CONTUSION OF RIGHT LOWER LEG, INITIAL ENCOUNTER: ICD-10-CM

## 2018-12-29 NOTE — ED PROVIDER NOTE - ATTENDING CONTRIBUTION TO CARE
I was present for and supervised the key and critical aspects of the procedures performed during the care of the patient. Patient presents for evaluation of bruising to the right leg, she has a recent history of an avusion fx.  she was placed in a boot and was doing well.  pedal pulses 2 +=, her children noted increasing bruising noted and brought her in for re-evaluation. pedal pulses 2 +, there is no significant swellling noted I offered patient us here in the ED, unlikely dvt I will discharge with follow up to her previously scheduled appt, we discussed indications to return at this time

## 2018-12-29 NOTE — ED PROVIDER NOTE - PROGRESS NOTE DETAILS
Pt offered u/s in ED, requesting to leave instead. Explained to pt bruising is normal post fracture. Pt has an appointment with an orthopedist at [facility] on 1/4/19. Instructed pt to f/u with ortho. Signs and symptoms which should prompt return discussed in detail and pt informed they may return to ED at any time. Pt agreeable with plan and comfortable with discharge.

## 2018-12-29 NOTE — ED PROVIDER NOTE - NS ED ROS FT
Except as documented in HPI, all other ROS negative.   GENERAL: Denies fever/chills, loss of appetite/weight or fatigue.  SKIN + ecchymosis.   MSK: Denies myalgias, bony deformity or pain.   NEURO: Denies paresthesias, tingling, weakness, slurred speech or AMS.

## 2018-12-29 NOTE — ED PROVIDER NOTE - NSFOLLOWUPINSTRUCTIONS_ED_ALL_ED_FT
Return to the ER for worsening or concerning symptoms.    See discharge information sheets for further instructions on care for your condition.    KEEP YOUR APPOINTMENT WITH [facility] ORTHOPEDIST 1/4/19.

## 2018-12-29 NOTE — ED ADULT TRIAGE NOTE - CHIEF COMPLAINT QUOTE
Pt here last week for ankle pain/injury; pt here tonight for concern of pain/discoloration to right foot/ankle.

## 2018-12-29 NOTE — ED PROVIDER NOTE - OBJECTIVE STATEMENT
Pt is a 67 y/o Female, PMHX of GERD, htn, hypothyroidism, migraines, sleep apnea on CPAP, presents to ED w/ bruising to right leg. As per pt, was seen here 1 week ago, diagnosed with right ankle fx after call, was admitted saw ortho & podiatry. Was placed in boot, has orthopedic appointment at [facility] on 1/4. Today, children noticed there was bruising to her leg that didn't see before after injury and wanted to make sure it was okay. Pt states pain is tolerable, no numbness, tingling, weakness. No additional trauma.

## 2018-12-29 NOTE — ED PROVIDER NOTE - PHYSICAL EXAMINATION
VITAL SIGNS: I have reviewed the initial vital signs.   CONSTITUTIONAL: Awake, alert. Well-developed; well-nourished; in no distress. Non-toxic appearing.   SKIN: + mild ecchymosis to right mid tibia region, bruising along lateral aspect of right ankle.   EXT: No bony deformity or tenderness. Normal ROM x 4 extremities. No calf tenderness/edema/erythema.   NEURO: Strength 5/5 UE/LE b/l. No sensory deficits. n/v intact UE/LE b/l, pulses symmetrical.

## 2019-01-02 DIAGNOSIS — H59.029 CATARACT (LENS) FRAGMENTS IN EYE FOLLOWING CATARACT SURGERY, UNSPECIFIED EYE: ICD-10-CM

## 2019-01-02 DIAGNOSIS — G43.909 MIGRAINE, UNSPECIFIED, NOT INTRACTABLE, WITHOUT STATUS MIGRAINOSUS: ICD-10-CM

## 2019-01-02 DIAGNOSIS — S92.351A DISPLACED FRACTURE OF FIFTH METATARSAL BONE, RIGHT FOOT, INITIAL ENCOUNTER FOR CLOSED FRACTURE: ICD-10-CM

## 2019-01-02 DIAGNOSIS — E03.9 HYPOTHYROIDISM, UNSPECIFIED: ICD-10-CM

## 2019-01-02 DIAGNOSIS — K21.9 GASTRO-ESOPHAGEAL REFLUX DISEASE WITHOUT ESOPHAGITIS: ICD-10-CM

## 2019-01-02 DIAGNOSIS — Z99.89 DEPENDENCE ON OTHER ENABLING MACHINES AND DEVICES: ICD-10-CM
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
## 2019-01-02 DIAGNOSIS — G47.33 OBSTRUCTIVE SLEEP APNEA (ADULT) (PEDIATRIC): ICD-10-CM

## 2019-01-02 DIAGNOSIS — R07.89 OTHER CHEST PAIN: ICD-10-CM

## 2019-01-02 DIAGNOSIS — S93.401A SPRAIN OF UNSPECIFIED LIGAMENT OF RIGHT ANKLE, INITIAL ENCOUNTER: ICD-10-CM
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
## 2019-01-02 DIAGNOSIS — I10 ESSENTIAL (PRIMARY) HYPERTENSION: ICD-10-CM

## 2019-01-25 ENCOUNTER — OUTPATIENT (OUTPATIENT)
Dept: OUTPATIENT SERVICES | Facility: HOSPITAL | Age: 69
LOS: 1 days | End: 2019-01-25
Payer: MEDICARE

## 2019-01-25 DIAGNOSIS — Z98.42 CATARACT EXTRACTION STATUS, LEFT EYE: Chronic | ICD-10-CM

## 2019-01-25 DIAGNOSIS — Z98.891 HISTORY OF UTERINE SCAR FROM PREVIOUS SURGERY: Chronic | ICD-10-CM

## 2019-01-25 DIAGNOSIS — Z98.890 OTHER SPECIFIED POSTPROCEDURAL STATES: Chronic | ICD-10-CM

## 2019-01-25 PROCEDURE — 73630 X-RAY EXAM OF FOOT: CPT | Mod: 26,RT

## 2019-01-25 PROCEDURE — 73600 X-RAY EXAM OF ANKLE: CPT

## 2019-01-25 PROCEDURE — 73600 X-RAY EXAM OF ANKLE: CPT | Mod: 26,RT

## 2019-01-25 PROCEDURE — 73630 X-RAY EXAM OF FOOT: CPT

## 2019-02-22 ENCOUNTER — OUTPATIENT (OUTPATIENT)
Dept: OUTPATIENT SERVICES | Facility: HOSPITAL | Age: 69
LOS: 1 days | End: 2019-02-22
Payer: MEDICARE

## 2019-02-22 DIAGNOSIS — Z98.890 OTHER SPECIFIED POSTPROCEDURAL STATES: Chronic | ICD-10-CM

## 2019-02-22 DIAGNOSIS — Z98.42 CATARACT EXTRACTION STATUS, LEFT EYE: Chronic | ICD-10-CM

## 2019-02-22 DIAGNOSIS — Z98.891 HISTORY OF UTERINE SCAR FROM PREVIOUS SURGERY: Chronic | ICD-10-CM

## 2019-02-22 PROCEDURE — 73630 X-RAY EXAM OF FOOT: CPT | Mod: 26,RT

## 2019-02-22 PROCEDURE — 73630 X-RAY EXAM OF FOOT: CPT

## 2019-02-25 ENCOUNTER — OUTPATIENT (OUTPATIENT)
Dept: OUTPATIENT SERVICES | Facility: HOSPITAL | Age: 69
LOS: 1 days | Discharge: HOME | End: 2019-02-25

## 2019-02-25 DIAGNOSIS — Z98.891 HISTORY OF UTERINE SCAR FROM PREVIOUS SURGERY: Chronic | ICD-10-CM

## 2019-02-25 DIAGNOSIS — Z12.31 ENCOUNTER FOR SCREENING MAMMOGRAM FOR MALIGNANT NEOPLASM OF BREAST: ICD-10-CM

## 2019-02-25 DIAGNOSIS — Z98.890 OTHER SPECIFIED POSTPROCEDURAL STATES: Chronic | ICD-10-CM

## 2019-02-25 DIAGNOSIS — Z98.42 CATARACT EXTRACTION STATUS, LEFT EYE: Chronic | ICD-10-CM

## 2019-02-26 DIAGNOSIS — Z87.310 PERSONAL HISTORY OF (HEALED) OSTEOPOROSIS FRACTURE: ICD-10-CM

## 2019-02-26 DIAGNOSIS — M89.9 DISORDER OF BONE, UNSPECIFIED: ICD-10-CM

## 2019-02-26 DIAGNOSIS — Z13.820 ENCOUNTER FOR SCREENING FOR OSTEOPOROSIS: ICD-10-CM

## 2019-02-26 DIAGNOSIS — Z78.0 ASYMPTOMATIC MENOPAUSAL STATE: ICD-10-CM

## 2019-02-28 ENCOUNTER — EMERGENCY (EMERGENCY)
Facility: HOSPITAL | Age: 69
LOS: 0 days | Discharge: HOME | End: 2019-02-28
Attending: EMERGENCY MEDICINE | Admitting: EMERGENCY MEDICINE

## 2019-02-28 VITALS
OXYGEN SATURATION: 100 % | SYSTOLIC BLOOD PRESSURE: 142 MMHG | TEMPERATURE: 96 F | HEIGHT: 68 IN | RESPIRATION RATE: 18 BRPM | DIASTOLIC BLOOD PRESSURE: 73 MMHG | HEART RATE: 73 BPM | WEIGHT: 195.11 LBS

## 2019-02-28 VITALS — HEART RATE: 66 BPM | DIASTOLIC BLOOD PRESSURE: 70 MMHG | SYSTOLIC BLOOD PRESSURE: 138 MMHG

## 2019-02-28 DIAGNOSIS — G89.29 OTHER CHRONIC PAIN: ICD-10-CM

## 2019-02-28 DIAGNOSIS — R23.1 PALLOR: ICD-10-CM

## 2019-02-28 DIAGNOSIS — H92.01 OTALGIA, RIGHT EAR: ICD-10-CM

## 2019-02-28 DIAGNOSIS — K21.9 GASTRO-ESOPHAGEAL REFLUX DISEASE WITHOUT ESOPHAGITIS: ICD-10-CM

## 2019-02-28 DIAGNOSIS — H73.891 OTHER SPECIFIED DISORDERS OF TYMPANIC MEMBRANE, RIGHT EAR: ICD-10-CM

## 2019-02-28 DIAGNOSIS — E03.9 HYPOTHYROIDISM, UNSPECIFIED: ICD-10-CM

## 2019-02-28 DIAGNOSIS — Z91.040 LATEX ALLERGY STATUS: ICD-10-CM

## 2019-02-28 DIAGNOSIS — Z98.891 HISTORY OF UTERINE SCAR FROM PREVIOUS SURGERY: Chronic | ICD-10-CM

## 2019-02-28 DIAGNOSIS — R42 DIZZINESS AND GIDDINESS: ICD-10-CM

## 2019-02-28 DIAGNOSIS — I10 ESSENTIAL (PRIMARY) HYPERTENSION: ICD-10-CM

## 2019-02-28 DIAGNOSIS — Z98.42 CATARACT EXTRACTION STATUS, LEFT EYE: Chronic | ICD-10-CM

## 2019-02-28 DIAGNOSIS — Z88.0 ALLERGY STATUS TO PENICILLIN: ICD-10-CM

## 2019-02-28 DIAGNOSIS — Z98.890 OTHER SPECIFIED POSTPROCEDURAL STATES: Chronic | ICD-10-CM

## 2019-02-28 DIAGNOSIS — H55.00 UNSPECIFIED NYSTAGMUS: ICD-10-CM

## 2019-02-28 DIAGNOSIS — H61.21 IMPACTED CERUMEN, RIGHT EAR: ICD-10-CM

## 2019-02-28 LAB
ALBUMIN SERPL ELPH-MCNC: 4.1 G/DL — SIGNIFICANT CHANGE UP (ref 3.5–5.2)
ALP SERPL-CCNC: 93 U/L — SIGNIFICANT CHANGE UP (ref 30–115)
ALT FLD-CCNC: 19 U/L — SIGNIFICANT CHANGE UP (ref 0–41)
ANION GAP SERPL CALC-SCNC: 10 MMOL/L — SIGNIFICANT CHANGE UP (ref 7–14)
AST SERPL-CCNC: 18 U/L — SIGNIFICANT CHANGE UP (ref 0–41)
BASOPHILS # BLD AUTO: 0.03 K/UL — SIGNIFICANT CHANGE UP (ref 0–0.2)
BASOPHILS NFR BLD AUTO: 0.5 % — SIGNIFICANT CHANGE UP (ref 0–1)
BILIRUB SERPL-MCNC: 0.2 MG/DL — SIGNIFICANT CHANGE UP (ref 0.2–1.2)
BUN SERPL-MCNC: 22 MG/DL — HIGH (ref 10–20)
CALCIUM SERPL-MCNC: 9.3 MG/DL — SIGNIFICANT CHANGE UP (ref 8.5–10.1)
CHLORIDE SERPL-SCNC: 97 MMOL/L — LOW (ref 98–110)
CO2 SERPL-SCNC: 31 MMOL/L — SIGNIFICANT CHANGE UP (ref 17–32)
CREAT SERPL-MCNC: 0.8 MG/DL — SIGNIFICANT CHANGE UP (ref 0.7–1.5)
EOSINOPHIL # BLD AUTO: 0.14 K/UL — SIGNIFICANT CHANGE UP (ref 0–0.7)
EOSINOPHIL NFR BLD AUTO: 2.2 % — SIGNIFICANT CHANGE UP (ref 0–8)
GLUCOSE SERPL-MCNC: 97 MG/DL — SIGNIFICANT CHANGE UP (ref 70–99)
HCT VFR BLD CALC: 39.9 % — SIGNIFICANT CHANGE UP (ref 37–47)
HGB BLD-MCNC: 13.1 G/DL — SIGNIFICANT CHANGE UP (ref 12–16)
IMM GRANULOCYTES NFR BLD AUTO: 0.2 % — SIGNIFICANT CHANGE UP (ref 0.1–0.3)
LYMPHOCYTES # BLD AUTO: 1.92 K/UL — SIGNIFICANT CHANGE UP (ref 1.2–3.4)
LYMPHOCYTES # BLD AUTO: 30.5 % — SIGNIFICANT CHANGE UP (ref 20.5–51.1)
MCHC RBC-ENTMCNC: 30.3 PG — SIGNIFICANT CHANGE UP (ref 27–31)
MCHC RBC-ENTMCNC: 32.8 G/DL — SIGNIFICANT CHANGE UP (ref 32–37)
MCV RBC AUTO: 92.1 FL — SIGNIFICANT CHANGE UP (ref 81–99)
MONOCYTES # BLD AUTO: 0.69 K/UL — HIGH (ref 0.1–0.6)
MONOCYTES NFR BLD AUTO: 11 % — HIGH (ref 1.7–9.3)
NEUTROPHILS # BLD AUTO: 3.5 K/UL — SIGNIFICANT CHANGE UP (ref 1.4–6.5)
NEUTROPHILS NFR BLD AUTO: 55.6 % — SIGNIFICANT CHANGE UP (ref 42.2–75.2)
NRBC # BLD: 0 /100 WBCS — SIGNIFICANT CHANGE UP (ref 0–0)
PLATELET # BLD AUTO: 225 K/UL — SIGNIFICANT CHANGE UP (ref 130–400)
POTASSIUM SERPL-MCNC: 4.2 MMOL/L — SIGNIFICANT CHANGE UP (ref 3.5–5)
POTASSIUM SERPL-SCNC: 4.2 MMOL/L — SIGNIFICANT CHANGE UP (ref 3.5–5)
PROT SERPL-MCNC: 6.8 G/DL — SIGNIFICANT CHANGE UP (ref 6–8)
RBC # BLD: 4.33 M/UL — SIGNIFICANT CHANGE UP (ref 4.2–5.4)
RBC # FLD: 14 % — SIGNIFICANT CHANGE UP (ref 11.5–14.5)
SODIUM SERPL-SCNC: 138 MMOL/L — SIGNIFICANT CHANGE UP (ref 135–146)
WBC # BLD: 6.29 K/UL — SIGNIFICANT CHANGE UP (ref 4.8–10.8)
WBC # FLD AUTO: 6.29 K/UL — SIGNIFICANT CHANGE UP (ref 4.8–10.8)

## 2019-02-28 RX ORDER — MECLIZINE HCL 12.5 MG
1 TABLET ORAL
Qty: 21 | Refills: 0
Start: 2019-02-28 | End: 2019-03-06

## 2019-02-28 RX ORDER — MECLIZINE HCL 12.5 MG
25 TABLET ORAL ONCE
Qty: 0 | Refills: 0 | Status: COMPLETED | OUTPATIENT
Start: 2019-02-28 | End: 2019-02-28

## 2019-02-28 RX ADMIN — Medication 25 MILLIGRAM(S): at 10:17

## 2019-02-28 NOTE — ED PROVIDER NOTE - PHYSICAL EXAMINATION
Vital Signs: I have reviewed the initial vital signs.  Constitutional: well-nourished, no acute distress,   Eyes: PERRLA, EOMI, +right sided nystagmus, clear conjunctiva  ENT: MMM, TM unable to visualize , no nasal congestion  Cardiovascular: regular rate, regular rhythm, no murmur appreciated  Respiratory: unlabored respiratory effort, clear to auscultation bilaterally  Gastrointestinal: soft, non-tender, non-distended  abdomen, no pulsatile mass  Musculoskeletal: supple neck, no lower extremity edema, no bony tenderness  Integumentary: warm, dry, pallor  Neurologic: awake, alert, cranial nerves II-XII grossly intact, extremities’ motor and sensory functions grossly intact, no focal deficits, GCS 15  Psychiatric: appropriate mood, appropriate affect

## 2019-02-28 NOTE — ED ADULT NURSE NOTE - PMH
Cataract    Gastroesophageal reflux disease    Herniated disc, cervical    History of palpitations    Hypertension    Hypothyroid    Migraines    ETHAN on CPAP

## 2019-02-28 NOTE — ED PROVIDER NOTE - NS ED ROS FT
Review of Systems    Constitutional: (-) fever/ chills (-) weight loss  Eyes/ENT: (-) blurry vision, (-) epistaxis (-) sore throat (+) right ear pain  Cardiovascular: (-) chest pain, (-) syncope (-) palpitations  Respiratory: (-) cough, (-) shortness of breath  Gastrointestinal: (-) vomiting, (-) diarrhea (-) abdominal pain  Musculoskeletal: (-) neck pain, (-) back pain, (-) joint pain (-) pedal edema   Integumentary: (-) rash, (-) swelling  Neurological: (-) headache, (-) altered mental status (+) dizziness  Psychiatric: (-) hallucinations or depression   Allergic/Immunologic: (-) pruritus

## 2019-02-28 NOTE — ED PROVIDER NOTE - ATTENDING CONTRIBUTION TO CARE
Pt with exacerbation of chronic vertigo, right TM obscured, left TM partially visualized and normal, no mastoid ttp, neuro itnact, labs and studies reviewed, pt feels better is po tolerant and ambulatory, will d/c to continue outpatient eval. Patient counseled regarding conditions which should prompt return.

## 2019-02-28 NOTE — ED PROVIDER NOTE - OBJECTIVE STATEMENT
69 y/o female with hx of HTN, hypothyroid, ETHAN 67 y/o female with hx of HTN, hypothyroid, ETHAN, chronic migraines and neck pain presents with room spinning dizziness since this am. patient states she awoke with headache which is usual for her and took her medication. patient headache resolved but with dizziness when moving her head. patient has hx of vertigo in past. patient has chronic right sided ceruman impaction and has been following ENT. patient is to have surgical procedure for right ear. patient denies any visual changes, falls, head injury, palpitations, chest pain. 69 y/o female with hx of HTN, hypothyroid, ETHAN, chronic migraines and neck pain presents with room spinning dizziness since this am. patient states she awoke with headache which is usual for her and took her medication. patient headache resolved but with dizziness when moving her head. patient has hx of vertigo in past. patient has chronic right sided cerumen impaction and has been following ENT. patient is to have surgical procedure for right ear. patient denies any visual changes, falls, head injury, palpitations, chest pain.

## 2019-03-12 ENCOUNTER — TRANSCRIPTION ENCOUNTER (OUTPATIENT)
Age: 69
End: 2019-03-12

## 2019-06-04 PROBLEM — M50.20 OTHER CERVICAL DISC DISPLACEMENT, UNSPECIFIED CERVICAL REGION: Chronic | Status: ACTIVE | Noted: 2019-02-28

## 2019-07-18 ENCOUNTER — APPOINTMENT (OUTPATIENT)
Dept: CARDIOLOGY | Facility: CLINIC | Age: 69
End: 2019-07-18

## 2019-08-17 ENCOUNTER — EMERGENCY (EMERGENCY)
Facility: HOSPITAL | Age: 69
LOS: 0 days | Discharge: LEFT BEFORE DISCHARGE | End: 2019-08-17
Attending: EMERGENCY MEDICINE | Admitting: EMERGENCY MEDICINE
Payer: MEDICARE

## 2019-08-17 VITALS
RESPIRATION RATE: 18 BRPM | HEIGHT: 69 IN | SYSTOLIC BLOOD PRESSURE: 142 MMHG | TEMPERATURE: 98 F | HEART RATE: 73 BPM | OXYGEN SATURATION: 100 % | WEIGHT: 199.96 LBS | DIASTOLIC BLOOD PRESSURE: 82 MMHG

## 2019-08-17 DIAGNOSIS — Z98.42 CATARACT EXTRACTION STATUS, LEFT EYE: Chronic | ICD-10-CM

## 2019-08-17 DIAGNOSIS — Z91.040 LATEX ALLERGY STATUS: ICD-10-CM

## 2019-08-17 DIAGNOSIS — Z98.890 OTHER SPECIFIED POSTPROCEDURAL STATES: Chronic | ICD-10-CM

## 2019-08-17 DIAGNOSIS — Z98.891 HISTORY OF UTERINE SCAR FROM PREVIOUS SURGERY: Chronic | ICD-10-CM

## 2019-08-17 DIAGNOSIS — R19.7 DIARRHEA, UNSPECIFIED: ICD-10-CM

## 2019-08-17 DIAGNOSIS — Z88.0 ALLERGY STATUS TO PENICILLIN: ICD-10-CM

## 2019-08-17 DIAGNOSIS — Z88.5 ALLERGY STATUS TO NARCOTIC AGENT: ICD-10-CM

## 2019-08-17 DIAGNOSIS — R00.2 PALPITATIONS: ICD-10-CM

## 2019-08-17 DIAGNOSIS — R00.8 OTHER ABNORMALITIES OF HEART BEAT: ICD-10-CM

## 2019-08-17 DIAGNOSIS — R11.0 NAUSEA: ICD-10-CM

## 2019-08-17 DIAGNOSIS — Z88.8 ALLERGY STATUS TO OTHER DRUGS, MEDICAMENTS AND BIOLOGICAL SUBSTANCES STATUS: ICD-10-CM

## 2019-08-17 PROCEDURE — 99283 EMERGENCY DEPT VISIT LOW MDM: CPT

## 2019-08-17 RX ORDER — SODIUM CHLORIDE 9 MG/ML
1000 INJECTION, SOLUTION INTRAVENOUS ONCE
Refills: 0 | Status: COMPLETED | OUTPATIENT
Start: 2019-08-17 | End: 2019-08-17

## 2019-08-17 NOTE — ED PROVIDER NOTE - ATTENDING CONTRIBUTION TO CARE
I personally evaluated the patient. I reviewed the Resident’s or Physician Assistant’s note (as assigned above), and agree with the findings and plan except as documented in my note.  Chart reviewed. H/O HTN, hypothyroid, GERD, presents with nausea and diarrhea associated with palpitations. States she had bad chicken from a restaurant. Exam shows alert patient in no distress, HEENT NCAT, neck supple, lungs clear, RR S1S2, abdomens oft Nt +BS, no CCE. Will order labs, EKG, CXR and re-assess.

## 2019-08-17 NOTE — ED ADULT NURSE NOTE - NSIMPLEMENTINTERV_GEN_ALL_ED
Implemented All Universal Safety Interventions:  [place] to call system. Call bell, personal items and telephone within reach. Instruct patient to call for assistance. Room bathroom lighting operational. Non-slip footwear when patient is off stretcher. Physically safe environment: no spills, clutter or unnecessary equipment. Stretcher in lowest position, wheels locked, appropriate side rails in place.

## 2019-08-17 NOTE — ED PROVIDER NOTE - PHYSICAL EXAMINATION
GEN: Alert & Oriented x 3, No acute distress. Calm, appropriate.  Head and Neck: Normocephalic, atraumatic.   ENT:Oral mucosa pink, moist without lesions.  Eyes: PERRL. No conjunctival injection. No scleral icterus.   RESP: Lungs clear to auscult bilat. no wheezes, rhonchi or rales. No retractions. Equal air entry.  CARDIO: regular rate and rhythm, no murmurs, rubs or gallops. Normal S1, S2.  Radial pulses 2+ bilaterally. No lower extremity edema.  ABD: Soft, Nondistended. No rebound tenderness/guarding. No pulsatile mass. No tenderness with palpation x 4 quadrants.  MS: Full ROM of extremities.   SKIN: no rashes/lesions, no petechiae, no ecchymosis.  NEURO: CN II-XII grossly intact. Speech and cognition normal.

## 2019-08-17 NOTE — ED PROVIDER NOTE - NS ED ROS FT
GEN: (-) fever, (-) chills, (-) malaise  HEENT: (-) vision changes, (-) HA  CV: (-) chest pain, (+) palpitations, (-) edema  PULM: (-) cough, (-) wheezing, (-) dyspnea, (-) orthopnea, (-) hemoptysis   GI: (-) abdominal pain,(+) Nausea, (-) Vomiting, (+) Diarrhea, (-) Melena  NEURO: (-) weakness, (-) paresthesias, (-) syncope  : (-) dysuria, (-) frequency, (-) urgency  MS: (-) back pain, (-) joint pain, (-)myalgias, (-) swelling  SKIN: (-) rashes, (-) new lesions  HEME: (-) bleeding, (-) ecchymosis

## 2019-08-17 NOTE — ED PROVIDER NOTE - OBJECTIVE STATEMENT
The pt is a 69y Female with PMH HTN, Hypothyroidism, GERD is presenting to ED with palpitations x 1 day. Pt states she has had intermittent palpitations worse after getting sick from eating out this afternoon. no aggravating or relieving factors. Pt also endorses nausea and 2 episodes of NB diarrhea. She states she believed she got food poisoning after eating chicken this afternoon. Pt denies cp, sob, v/f/c, urinary symptoms, dark stool, lightheadedness, dizziness, syncope. Pt sees cardiologist in [place], last stress test 3-6mo ago and it was normal. pt states she has a history of palpitations.

## 2019-09-02 ENCOUNTER — TRANSCRIPTION ENCOUNTER (OUTPATIENT)
Age: 69
End: 2019-09-02

## 2019-11-18 ENCOUNTER — INPATIENT (INPATIENT)
Facility: HOSPITAL | Age: 69
LOS: 1 days | Discharge: ANOTHER HOSPITAL | End: 2019-11-20
Attending: INTERNAL MEDICINE | Admitting: INTERNAL MEDICINE
Payer: MEDICARE

## 2019-11-18 VITALS
RESPIRATION RATE: 18 BRPM | SYSTOLIC BLOOD PRESSURE: 134 MMHG | OXYGEN SATURATION: 96 % | TEMPERATURE: 96 F | HEART RATE: 69 BPM | DIASTOLIC BLOOD PRESSURE: 78 MMHG

## 2019-11-18 DIAGNOSIS — Z98.891 HISTORY OF UTERINE SCAR FROM PREVIOUS SURGERY: Chronic | ICD-10-CM

## 2019-11-18 DIAGNOSIS — Z98.42 CATARACT EXTRACTION STATUS, LEFT EYE: Chronic | ICD-10-CM

## 2019-11-18 DIAGNOSIS — Z98.890 OTHER SPECIFIED POSTPROCEDURAL STATES: Chronic | ICD-10-CM

## 2019-11-18 LAB
ALBUMIN SERPL ELPH-MCNC: 4.4 G/DL — SIGNIFICANT CHANGE UP (ref 3.5–5.2)
ALP SERPL-CCNC: 96 U/L — SIGNIFICANT CHANGE UP (ref 30–115)
ALT FLD-CCNC: 19 U/L — SIGNIFICANT CHANGE UP (ref 0–41)
ANION GAP SERPL CALC-SCNC: 17 MMOL/L — HIGH (ref 7–14)
AST SERPL-CCNC: 24 U/L — SIGNIFICANT CHANGE UP (ref 0–41)
BASOPHILS # BLD AUTO: 0.03 K/UL — SIGNIFICANT CHANGE UP (ref 0–0.2)
BASOPHILS NFR BLD AUTO: 0.3 % — SIGNIFICANT CHANGE UP (ref 0–1)
BILIRUB SERPL-MCNC: <0.2 MG/DL — SIGNIFICANT CHANGE UP (ref 0.2–1.2)
BUN SERPL-MCNC: 19 MG/DL — SIGNIFICANT CHANGE UP (ref 10–20)
CALCIUM SERPL-MCNC: 9.3 MG/DL — SIGNIFICANT CHANGE UP (ref 8.5–10.1)
CHLORIDE SERPL-SCNC: 97 MMOL/L — LOW (ref 98–110)
CO2 SERPL-SCNC: 25 MMOL/L — SIGNIFICANT CHANGE UP (ref 17–32)
CREAT SERPL-MCNC: 0.9 MG/DL — SIGNIFICANT CHANGE UP (ref 0.7–1.5)
EOSINOPHIL # BLD AUTO: 0.11 K/UL — SIGNIFICANT CHANGE UP (ref 0–0.7)
EOSINOPHIL NFR BLD AUTO: 1.2 % — SIGNIFICANT CHANGE UP (ref 0–8)
GLUCOSE SERPL-MCNC: 150 MG/DL — HIGH (ref 70–99)
HCT VFR BLD CALC: 41.4 % — SIGNIFICANT CHANGE UP (ref 37–47)
HGB BLD-MCNC: 13.8 G/DL — SIGNIFICANT CHANGE UP (ref 12–16)
IMM GRANULOCYTES NFR BLD AUTO: 0.5 % — HIGH (ref 0.1–0.3)
LIDOCAIN IGE QN: 34 U/L — SIGNIFICANT CHANGE UP (ref 7–60)
LYMPHOCYTES # BLD AUTO: 2.12 K/UL — SIGNIFICANT CHANGE UP (ref 1.2–3.4)
LYMPHOCYTES # BLD AUTO: 24 % — SIGNIFICANT CHANGE UP (ref 20.5–51.1)
MCHC RBC-ENTMCNC: 30.2 PG — SIGNIFICANT CHANGE UP (ref 27–31)
MCHC RBC-ENTMCNC: 33.3 G/DL — SIGNIFICANT CHANGE UP (ref 32–37)
MCV RBC AUTO: 90.6 FL — SIGNIFICANT CHANGE UP (ref 81–99)
MONOCYTES # BLD AUTO: 0.79 K/UL — HIGH (ref 0.1–0.6)
MONOCYTES NFR BLD AUTO: 9 % — SIGNIFICANT CHANGE UP (ref 1.7–9.3)
NEUTROPHILS # BLD AUTO: 5.73 K/UL — SIGNIFICANT CHANGE UP (ref 1.4–6.5)
NEUTROPHILS NFR BLD AUTO: 65 % — SIGNIFICANT CHANGE UP (ref 42.2–75.2)
NRBC # BLD: 0 /100 WBCS — SIGNIFICANT CHANGE UP (ref 0–0)
PLATELET # BLD AUTO: 223 K/UL — SIGNIFICANT CHANGE UP (ref 130–400)
POTASSIUM SERPL-MCNC: 3.4 MMOL/L — LOW (ref 3.5–5)
POTASSIUM SERPL-SCNC: 3.4 MMOL/L — LOW (ref 3.5–5)
PROT SERPL-MCNC: 7.4 G/DL — SIGNIFICANT CHANGE UP (ref 6–8)
RBC # BLD: 4.57 M/UL — SIGNIFICANT CHANGE UP (ref 4.2–5.4)
RBC # FLD: 13.5 % — SIGNIFICANT CHANGE UP (ref 11.5–14.5)
SODIUM SERPL-SCNC: 139 MMOL/L — SIGNIFICANT CHANGE UP (ref 135–146)
TROPONIN T SERPL-MCNC: <0.01 NG/ML — SIGNIFICANT CHANGE UP
WBC # BLD: 8.82 K/UL — SIGNIFICANT CHANGE UP (ref 4.8–10.8)
WBC # FLD AUTO: 8.82 K/UL — SIGNIFICANT CHANGE UP (ref 4.8–10.8)

## 2019-11-18 PROCEDURE — 99285 EMERGENCY DEPT VISIT HI MDM: CPT

## 2019-11-18 PROCEDURE — 74176 CT ABD & PELVIS W/O CONTRAST: CPT | Mod: 26

## 2019-11-18 PROCEDURE — 71045 X-RAY EXAM CHEST 1 VIEW: CPT | Mod: 26

## 2019-11-18 PROCEDURE — 72125 CT NECK SPINE W/O DYE: CPT | Mod: 26

## 2019-11-18 PROCEDURE — 71250 CT THORAX DX C-: CPT | Mod: 26

## 2019-11-18 PROCEDURE — 73030 X-RAY EXAM OF SHOULDER: CPT | Mod: 26,RT

## 2019-11-18 PROCEDURE — 70450 CT HEAD/BRAIN W/O DYE: CPT | Mod: 26

## 2019-11-18 PROCEDURE — 73200 CT UPPER EXTREMITY W/O DYE: CPT | Mod: 26,RT

## 2019-11-18 RX ORDER — TETANUS TOXOID, REDUCED DIPHTHERIA TOXOID AND ACELLULAR PERTUSSIS VACCINE, ADSORBED 5; 2.5; 8; 8; 2.5 [IU]/.5ML; [IU]/.5ML; UG/.5ML; UG/.5ML; UG/.5ML
0.5 SUSPENSION INTRAMUSCULAR ONCE
Refills: 0 | Status: DISCONTINUED | OUTPATIENT
Start: 2019-11-18 | End: 2019-11-18

## 2019-11-18 RX ORDER — ONDANSETRON 8 MG/1
4 TABLET, FILM COATED ORAL ONCE
Refills: 0 | Status: COMPLETED | OUTPATIENT
Start: 2019-11-18 | End: 2019-11-18

## 2019-11-18 RX ORDER — POTASSIUM CHLORIDE 20 MEQ
20 PACKET (EA) ORAL
Refills: 0 | Status: COMPLETED | OUTPATIENT
Start: 2019-11-18 | End: 2019-11-19

## 2019-11-18 RX ORDER — SODIUM CHLORIDE 9 MG/ML
1000 INJECTION INTRAMUSCULAR; INTRAVENOUS; SUBCUTANEOUS ONCE
Refills: 0 | Status: COMPLETED | OUTPATIENT
Start: 2019-11-18 | End: 2019-11-18

## 2019-11-18 RX ORDER — ALPRAZOLAM 0.25 MG
0.25 TABLET ORAL ONCE
Refills: 0 | Status: DISCONTINUED | OUTPATIENT
Start: 2019-11-18 | End: 2019-11-18

## 2019-11-18 RX ORDER — HYDROMORPHONE HYDROCHLORIDE 2 MG/ML
1 INJECTION INTRAMUSCULAR; INTRAVENOUS; SUBCUTANEOUS ONCE
Refills: 0 | Status: DISCONTINUED | OUTPATIENT
Start: 2019-11-18 | End: 2019-11-18

## 2019-11-18 RX ORDER — KETOROLAC TROMETHAMINE 30 MG/ML
15 SYRINGE (ML) INJECTION ONCE
Refills: 0 | Status: DISCONTINUED | OUTPATIENT
Start: 2019-11-18 | End: 2019-11-18

## 2019-11-18 RX ADMIN — ONDANSETRON 4 MILLIGRAM(S): 8 TABLET, FILM COATED ORAL at 16:00

## 2019-11-18 RX ADMIN — HYDROMORPHONE HYDROCHLORIDE 1 MILLIGRAM(S): 2 INJECTION INTRAMUSCULAR; INTRAVENOUS; SUBCUTANEOUS at 15:31

## 2019-11-18 RX ADMIN — SODIUM CHLORIDE 2000 MILLILITER(S): 9 INJECTION INTRAMUSCULAR; INTRAVENOUS; SUBCUTANEOUS at 15:31

## 2019-11-18 RX ADMIN — ONDANSETRON 4 MILLIGRAM(S): 8 TABLET, FILM COATED ORAL at 15:31

## 2019-11-18 RX ADMIN — Medication 0.25 MILLIGRAM(S): at 17:39

## 2019-11-18 RX ADMIN — Medication 15 MILLIGRAM(S): at 21:27

## 2019-11-18 NOTE — ED ADULT NURSE NOTE - OBJECTIVE STATEMENT
mechanical trip and fall at 1400 in house, + LOC, not on blood thinners. GCS 15 at present time. hit right side of head, c/o right shoulder pain

## 2019-11-18 NOTE — H&P ADULT - HISTORY OF PRESENT ILLNESS
68 Y/O F with PMH of ETHAN on home CPAP, Hypothyroid, GERD, Migraine, Palpations , rheumatic fever, MVP presented after falling down.  Patient presented after falling down today. patient said that she tripped while walking and fell down and hit her head and shoulder, she denied any LOC before, lightheadedness, blurry of vision, feeling unsteady or weak.  after falling patient had right shoulder pain. she also had LOC for 5 seconds,  no seizure like activity, denied any chest pain, palpations, no history of previous episodes of syncope, no GI, or urinary Sx,   patient had been worked up for increased palpations 3 weeks ago by Dr. Claudio Ortiz and found to have PVC, PAC as per pt.  pt was brought by EMS, was given fentanyl after which she felt nauseated and vomited, In the ED trauma work up was done and was remarkable for comminuted right humeral head and neck fracture. she also received Dilaudid and felt nauseated and had several episodes of vomiting after.

## 2019-11-18 NOTE — H&P ADULT - NSICDXPASTMEDICALHX_GEN_ALL_CORE_FT
PAST MEDICAL HISTORY:  Cataract     Gastroesophageal reflux disease     H/O: rheumatic fever     Herniated disc, cervical     History of palpitations     Hypertension     Hypothyroid     Migraines     Mitral valve prolapse     ETHAN on CPAP

## 2019-11-18 NOTE — ED PROVIDER NOTE - CLINICAL SUMMARY MEDICAL DECISION MAKING FREE TEXT BOX
68 y/o female presented to ED for fall on right shoulder. + LOC for several seconds. Labs, XR, panscan obtained. Pt found to have proximal humerus fracture. Discussed with ortho who evaluated pt, no acute surgical intervention. Pt feeling better. Will admit for LOC, possible syncope workup. Pt stable on admission.

## 2019-11-18 NOTE — H&P ADULT - NSHPLABSRESULTS_GEN_ALL_CORE
13.8   8.82  )-----------( 223      ( 18 Nov 2019 15:31 )             41                      13.8   8.82  )-----------( 223      ( 18 Nov 2019 15:31 )             41.4       11-18    139  |  97<L>  |  19  ----------------------------<  150<H>  3.4<L>   |  25  |  0.9    Ca    9.3      18 Nov 2019 15:31    TPro  7.4  /  Alb  4.4  /  TBili  <0.2  /  DBili  x   /  AST  24  /  ALT  19  /  AlkPhos  96  11-18                    CARDIAC MARKERS ( 18 Nov 2019 15:31 )  x     / <0.01 ng/mL / x     / x     / x              CT Abdomen and Pelvis No Cont:       IMPRESSION: No evidence of intrathoracic or intra-abdominal/pelvic   visceral laceration or hemorrhage.  Comminuted fracture of the right   humeral head and neck            (11-18-19 @ 18:37)  CT Chest No Cont:   EXAM:  CT CHEST        EXAM:  CT ABDOMEN AND PELVIS            PROCEDURE DATE:  11/18/2019            INTERPRETATION:  CLINICAL STATEMENT: Trauma      TECHNIQUE: Contiguous axial CT images were obtained from the lower chest   to the pubic symphysis.  Oral contrast is not given.  Reformatted images   in the coronal and sagittal planes were acquired.    COMPARISON CT: CT scan of the abdomen and pelvis dated 5/3/2018        IMPRESSION: No evidence of intrathoracic or intra-abdominal/pelvic   visceral laceration or hemorrhage.  Comminuted fracture of the right   humeral head and neck

## 2019-11-18 NOTE — CONSULT NOTE ADULT - ASSESSMENT
right proximal humerus fx     pain control  nwb   sling   will discuss with dr garcia right proximal humerus fx     pain control  nwb   sling   ct scan   trauma work up   will discuss with dr garcia right proximal humerus fx     pain control  nwb   sling   ct scan   trauma work up   discussed with dr garcia, confirm location with ct scan, if located can fu as op 1 week [phone] to discuss op verses nonop treatment

## 2019-11-18 NOTE — ED PROVIDER NOTE - PHYSICAL EXAMINATION
PHYSICAL EXAM:    Constitutional: awake, alert, uncomfortable appearing  Eyes: EOMI, no conj injection  HENT: NC AT  Back: mild mid C/T spine ttp, no l spine ttp  Respiratory: no respiratory distress, breath sounds equal b/l, no wheezing, rhonchi or stridor.   Cardiovascular: RRR nml S1S2  Gastrointestinal: soft, no masses, nontender, nondistended. No guarding or rebound.   Extremities:  R shoulder w/ significant TTP, in sling, no ttp at elbow, forearm, wrist, fingers, radial pulse 2+, SILT M/U/R  Pelvis- stable  Neurological: AAOx3, CN II-XII grossly intact, no focal numbness or weakness  Skin: no rash  Musculoskeletal: no gross deformity

## 2019-11-18 NOTE — H&P ADULT - NSICDXPASTSURGICALHX_GEN_ALL_CORE_FT
PAST SURGICAL HISTORY:  Cataract extraction status of eye, left     H/O knee surgery torn minsicus repair b/l knee    H/O:

## 2019-11-18 NOTE — ED ADULT TRIAGE NOTE - CHIEF COMPLAINT QUOTE
Pt tripped and fell today, hit head on door, landed on her R side, + LOC, no use of blood thinners, c/o R shoulder pain. Pt was given Fentanyl 100 mcg ivp by EMS pta, PT AOX3, GCS 15. C-collar in place.

## 2019-11-18 NOTE — ED PROVIDER NOTE - NS ED ROS FT
Constitutional: no fever or rigors  Eyes: no eye redness, acute visual change  ENMT: no ear pain, no throat pain  Card: no chest pain, no palpitations  Pulm: no cough, no shortness of breath  GI: no abdominal pain, nausea or vomiting  : no dysuria or hematuria  MSK: pos neck pain, positive shoulder pain  Skin: no rash, no abrasion  Neuro: no numbness, no weakness  Heme/Onc: no easy bruising, no bleeding tendency   Allergic: no hives, no throat swelling

## 2019-11-18 NOTE — CONSULT NOTE ADULT - SUBJECTIVE AND OBJECTIVE BOX
Orthopaedic Surgery Consult Note    For Surgeon:    HPI:  69yFemale  Patient is a 69y old  Female who presents with a chief complaint of right shoulder pain s/p fall.  Pt found to have right proximal humerus fx   HPI:      Allergies    codeine (Vomiting; Nausea)  cortisone (Other; Rash; Pruritus)  latex (Pruritus; Rash)  lidocaine topical (Rash)  penicillin (Red Man Synd; Rash; Pruritus)  predniSONE (Other; Rash)    Intolerances      PAST MEDICAL & SURGICAL HISTORY:  Herniated disc, cervical  History of palpitations  ETHAN on CPAP  Migraines  Cataract  Gastroesophageal reflux disease  Hypothyroid  Hypertension  Cataract extraction status of eye, left  H/O knee surgery: torn minsicus repair b/l knee  H/O:     MEDICATIONS  (STANDING):    MEDICATIONS  (PRN):      Vital Signs Last 24 Hrs  T(C): 35.8 (2019 15:02), Max: 35.8 (2019 15:02)  T(F): 96.5 (2019 15:02), Max: 96.5 (2019 15:02)  HR: 69 (2019 15:02) (69 - 69)  BP: 134/78 (2019 15:02) (134/78 - 134/78)  BP(mean): --  RR: 18 (2019 15:02) (18 - 18)  SpO2: 96% (2019 15:02) (96% - 96%)    Physical Exam:                          13.8   8.82  )-----------( 223      ( 2019 15:31 )             41.4     11-18    139  |  97<L>  |  19  ----------------------------<  150<H>  3.4<L>   |  25  |  0.9    Ca    9.3      2019 15:31    TPro  7.4  /  Alb  4.4  /  TBili  <0.2  /  DBili  x   /  AST  24  /  ALT  19  /  AlkPhos  96  -18      Imaging:     A/P: 69yFemale    -Discussed with  Orthopaedic Surgery Consult Note    For Surgeon:    HPI:  69yFemale  Patient is a 69y old  Female who presents with a chief complaint of right shoulder pain s/p fall.  Pt found to have right proximal humerus fx.  Pt reports mechanical fall tripping over shoes and falling into door.  Pt reports to short period of loc after fall.  Pt is rhd   HPI:      Allergies    codeine (Vomiting; Nausea)  cortisone (Other; Rash; Pruritus)  latex (Pruritus; Rash)  lidocaine topical (Rash)  penicillin (Red Man Synd; Rash; Pruritus)  predniSONE (Other; Rash)    Intolerances      PAST MEDICAL & SURGICAL HISTORY:  Herniated disc, cervical  History of palpitations  ETHAN on CPAP  Migraines  Cataract  Gastroesophageal reflux disease  Hypothyroid  Hypertension  Cataract extraction status of eye, left  H/O knee surgery: torn minsicus repair b/l knee  H/O:     MEDICATIONS  (STANDING):    MEDICATIONS  (PRN):      Vital Signs Last 24 Hrs  T(C): 35.8 (2019 15:02), Max: 35.8 (2019 15:02)  T(F): 96.5 (2019 15:02), Max: 96.5 (2019 15:02)  HR: 69 (2019 15:02) (69 - 69)  BP: 134/78 (2019 15:02) (134/78 - 134/78)  BP(mean): --  RR: 18 (2019 15:02) (18 - 18)  SpO2: 96% (2019 15:02) (96% - 96%)    Physical Exam:  right upper ext: skin intact, pain about the humerus with palpation, rom limited secondary to pain, no pain at elbow wrist or fingers, strong pulse, ain pin ok, can make fist, no deficit noted, sensation intact, reports numbness in 5th digit                           13.8   8.82  )-----------( 223      ( 2019 15:31 )             41.4     11-18    139  |  97<L>  |  19  ----------------------------<  150<H>  3.4<L>   |  25  |  0.9    Ca    9.3      2019 15:31    TPro  7.4  /  Alb  4.4  /  TBili  <0.2  /  DBili  x   /  AST  24  /  ALT  19  /  AlkPhos  96  11-18      Imaging: right proximal humerus fx no dislocation seen     A/P: 69yFemale    -Discussed with

## 2019-11-18 NOTE — H&P ADULT - ATTENDING COMMENTS
Patient seen and examined independently. Resident's H & P reviewed. Agree with the findings and plan of care except,     A 69 years old female presented to the ED S/P fall. Pt states that she tripped while walking and fell down hitting her head with the door and briefly loosing consciousness after hitting her head. No palpitations. No cp. No sob.     EKG: NSR @ 71/min. NS ST, T changes. (Interpreted by me.)  CT head: Unremarkable  CT Rt. Shoulder: Comminuted Fx of Rt humeral head and neck.     ASSESSMENT:    1. Mechanical Fall  2. Syncope likely due to concussion  3. Rt. Humerus head and neck comminuted and displaced Fx  4. ETHAN  5. Migraine  6. H/O Palpitations.     PLAN:    . Tele for 24 hrs  . Pain control  . Rt. Arm sling as per Ortho and NWB. F/U in one week as an out pt.  . Cont her home meds.  . Give Fioricet for Migraine.

## 2019-11-18 NOTE — ED PROVIDER NOTE - OBJECTIVE STATEMENT
68 yo f hypothyroid, ETHAN here for fall.  pt states she lost her balance and fell on her door. pt states she hit her neck and R shoulder. + loc. no preceding cp, sob, palpitations. pt now c/o lower cspine, upper back and R shoulder pain. ems called and gave pt fentanyl. pt states she felt nauseous and vomited after fentanyl.

## 2019-11-18 NOTE — H&P ADULT - NSHPPHYSICALEXAM_GEN_ALL_CORE
VITALS:     GENERAL: NAD, lying in bed comfortably  HEAD:  Atraumatic, Normocephalic  EYES: EOMI, PERRLA, conjunctiva and sclera clear  ENT: Moist mucous membranes  NECK: Supple, No JVD  CHEST/LUNG: Clear to auscultation bilaterally; No rales, rhonchi, wheezing, or rubs. Unlabored respirations  HEART: Regular rate and rhythm; No murmurs, rubs, or gallops  ABDOMEN: Bowel sounds present; Soft, Nontender, Nondistended.   EXTREMITIES:  2+ Peripheral Pulses, brisk capillary refill. No clubbing, cyanosis, or edema  NERVOUS SYSTEM:  Alert & Oriented X3, speech clear. No deficits   MSK: right arm sling VITALS:     GENERAL: NAD, lying in bed comfortably  HEAD:  Atraumatic, Normocephalic  EYES: EOMI, PERRLA, conjunctiva and sclera clear  ENT: Moist mucous membranes  NECK: Supple, No JVD  CHEST/LUNG: Clear to auscultation bilaterally; No rales, rhonchi, wheezing, or rubs. Unlabored respirations  HEART/CVS: Regular rate and rhythm; No murmurs, rubs, or gallops  ABDOMEN/GI: Bowel sounds present; Soft, Nontender, Nondistended.   EXTREMITIES:  2+ Peripheral Pulses, brisk capillary refill. No clubbing, cyanosis, or edema  NERVOUS SYSTEM:  Alert & Oriented X3, speech clear. No deficits   MSK: right arm sling

## 2019-11-19 ENCOUNTER — TRANSCRIPTION ENCOUNTER (OUTPATIENT)
Age: 69
End: 2019-11-19

## 2019-11-19 LAB
ANION GAP SERPL CALC-SCNC: 14 MMOL/L — SIGNIFICANT CHANGE UP (ref 7–14)
BASOPHILS # BLD AUTO: 0.02 K/UL — SIGNIFICANT CHANGE UP (ref 0–0.2)
BASOPHILS NFR BLD AUTO: 0.2 % — SIGNIFICANT CHANGE UP (ref 0–1)
BUN SERPL-MCNC: 18 MG/DL — SIGNIFICANT CHANGE UP (ref 10–20)
CALCIUM SERPL-MCNC: 8.6 MG/DL — SIGNIFICANT CHANGE UP (ref 8.5–10.1)
CHLORIDE SERPL-SCNC: 96 MMOL/L — LOW (ref 98–110)
CO2 SERPL-SCNC: 26 MMOL/L — SIGNIFICANT CHANGE UP (ref 17–32)
CREAT SERPL-MCNC: 0.7 MG/DL — SIGNIFICANT CHANGE UP (ref 0.7–1.5)
EOSINOPHIL # BLD AUTO: 0.02 K/UL — SIGNIFICANT CHANGE UP (ref 0–0.7)
EOSINOPHIL NFR BLD AUTO: 0.2 % — SIGNIFICANT CHANGE UP (ref 0–8)
GLUCOSE SERPL-MCNC: 108 MG/DL — HIGH (ref 70–99)
HCT VFR BLD CALC: 33 % — LOW (ref 37–47)
HGB BLD-MCNC: 11.2 G/DL — LOW (ref 12–16)
IMM GRANULOCYTES NFR BLD AUTO: 0.3 % — SIGNIFICANT CHANGE UP (ref 0.1–0.3)
LYMPHOCYTES # BLD AUTO: 1.73 K/UL — SIGNIFICANT CHANGE UP (ref 1.2–3.4)
LYMPHOCYTES # BLD AUTO: 16.8 % — LOW (ref 20.5–51.1)
MAGNESIUM SERPL-MCNC: 2 MG/DL — SIGNIFICANT CHANGE UP (ref 1.8–2.4)
MCHC RBC-ENTMCNC: 30.7 PG — SIGNIFICANT CHANGE UP (ref 27–31)
MCHC RBC-ENTMCNC: 33.9 G/DL — SIGNIFICANT CHANGE UP (ref 32–37)
MCV RBC AUTO: 90.4 FL — SIGNIFICANT CHANGE UP (ref 81–99)
MONOCYTES # BLD AUTO: 1.11 K/UL — HIGH (ref 0.1–0.6)
MONOCYTES NFR BLD AUTO: 10.8 % — HIGH (ref 1.7–9.3)
NEUTROPHILS # BLD AUTO: 7.37 K/UL — HIGH (ref 1.4–6.5)
NEUTROPHILS NFR BLD AUTO: 71.7 % — SIGNIFICANT CHANGE UP (ref 42.2–75.2)
NRBC # BLD: 0 /100 WBCS — SIGNIFICANT CHANGE UP (ref 0–0)
PLATELET # BLD AUTO: 231 K/UL — SIGNIFICANT CHANGE UP (ref 130–400)
POTASSIUM SERPL-MCNC: 4 MMOL/L — SIGNIFICANT CHANGE UP (ref 3.5–5)
POTASSIUM SERPL-SCNC: 4 MMOL/L — SIGNIFICANT CHANGE UP (ref 3.5–5)
RBC # BLD: 3.65 M/UL — LOW (ref 4.2–5.4)
RBC # FLD: 13.8 % — SIGNIFICANT CHANGE UP (ref 11.5–14.5)
SODIUM SERPL-SCNC: 136 MMOL/L — SIGNIFICANT CHANGE UP (ref 135–146)
WBC # BLD: 10.28 K/UL — SIGNIFICANT CHANGE UP (ref 4.8–10.8)
WBC # FLD AUTO: 10.28 K/UL — SIGNIFICANT CHANGE UP (ref 4.8–10.8)

## 2019-11-19 PROCEDURE — 93010 ELECTROCARDIOGRAM REPORT: CPT

## 2019-11-19 PROCEDURE — 99223 1ST HOSP IP/OBS HIGH 75: CPT | Mod: AI

## 2019-11-19 RX ORDER — POLYETHYLENE GLYCOL 3350 17 G/17G
17 POWDER, FOR SOLUTION ORAL DAILY
Refills: 0 | Status: DISCONTINUED | OUTPATIENT
Start: 2019-11-19 | End: 2019-11-20

## 2019-11-19 RX ORDER — DIPHENHYDRAMINE HCL 50 MG
25 CAPSULE ORAL ONCE
Refills: 0 | Status: DISCONTINUED | OUTPATIENT
Start: 2019-11-19 | End: 2019-11-20

## 2019-11-19 RX ORDER — PANTOPRAZOLE SODIUM 20 MG/1
40 TABLET, DELAYED RELEASE ORAL
Refills: 0 | Status: DISCONTINUED | OUTPATIENT
Start: 2019-11-19 | End: 2019-11-20

## 2019-11-19 RX ORDER — LEVOTHYROXINE SODIUM 125 MCG
137 TABLET ORAL DAILY
Refills: 0 | Status: DISCONTINUED | OUTPATIENT
Start: 2019-11-19 | End: 2019-11-20

## 2019-11-19 RX ORDER — HYDROCHLOROTHIAZIDE 25 MG
0 TABLET ORAL
Qty: 0 | Refills: 0 | DISCHARGE

## 2019-11-19 RX ORDER — KETOROLAC TROMETHAMINE 30 MG/ML
15 SYRINGE (ML) INJECTION EVERY 6 HOURS
Refills: 0 | Status: DISCONTINUED | OUTPATIENT
Start: 2019-11-19 | End: 2019-11-19

## 2019-11-19 RX ORDER — ALPRAZOLAM 0.25 MG
0.5 TABLET ORAL ONCE
Refills: 0 | Status: DISCONTINUED | OUTPATIENT
Start: 2019-11-19 | End: 2019-11-19

## 2019-11-19 RX ORDER — ACETAMINOPHEN 500 MG
650 TABLET ORAL ONCE
Refills: 0 | Status: COMPLETED | OUTPATIENT
Start: 2019-11-19 | End: 2019-11-19

## 2019-11-19 RX ORDER — ALPRAZOLAM 0.25 MG
0.25 TABLET ORAL AT BEDTIME
Refills: 0 | Status: DISCONTINUED | OUTPATIENT
Start: 2019-11-19 | End: 2019-11-20

## 2019-11-19 RX ORDER — ACETAMINOPHEN 500 MG
650 TABLET ORAL EVERY 6 HOURS
Refills: 0 | Status: DISCONTINUED | OUTPATIENT
Start: 2019-11-19 | End: 2019-11-20

## 2019-11-19 RX ORDER — LANOLIN ALCOHOL/MO/W.PET/CERES
5 CREAM (GRAM) TOPICAL AT BEDTIME
Refills: 0 | Status: DISCONTINUED | OUTPATIENT
Start: 2019-11-19 | End: 2019-11-19

## 2019-11-19 RX ORDER — LANOLIN ALCOHOL/MO/W.PET/CERES
5 CREAM (GRAM) TOPICAL ONCE
Refills: 0 | Status: COMPLETED | OUTPATIENT
Start: 2019-11-19 | End: 2019-11-19

## 2019-11-19 RX ORDER — ENOXAPARIN SODIUM 100 MG/ML
40 INJECTION SUBCUTANEOUS DAILY
Refills: 0 | Status: DISCONTINUED | OUTPATIENT
Start: 2019-11-19 | End: 2019-11-20

## 2019-11-19 RX ADMIN — Medication 650 MILLIGRAM(S): at 15:43

## 2019-11-19 RX ADMIN — Medication 0.25 MILLIGRAM(S): at 00:31

## 2019-11-19 RX ADMIN — Medication 650 MILLIGRAM(S): at 04:03

## 2019-11-19 RX ADMIN — ENOXAPARIN SODIUM 40 MILLIGRAM(S): 100 INJECTION SUBCUTANEOUS at 12:15

## 2019-11-19 RX ADMIN — Medication 15 MILLIGRAM(S): at 18:27

## 2019-11-19 RX ADMIN — Medication 15 MILLIGRAM(S): at 17:54

## 2019-11-19 RX ADMIN — PANTOPRAZOLE SODIUM 40 MILLIGRAM(S): 20 TABLET, DELAYED RELEASE ORAL at 06:23

## 2019-11-19 RX ADMIN — Medication 5 MILLIGRAM(S): at 00:31

## 2019-11-19 RX ADMIN — Medication 15 MILLIGRAM(S): at 06:54

## 2019-11-19 RX ADMIN — Medication 650 MILLIGRAM(S): at 16:05

## 2019-11-19 RX ADMIN — POLYETHYLENE GLYCOL 3350 17 GRAM(S): 17 POWDER, FOR SOLUTION ORAL at 12:17

## 2019-11-19 RX ADMIN — Medication 0.5 MILLIGRAM(S): at 14:28

## 2019-11-19 RX ADMIN — Medication 137 MICROGRAM(S): at 06:23

## 2019-11-19 RX ADMIN — Medication 650 MILLIGRAM(S): at 03:33

## 2019-11-19 RX ADMIN — Medication 15 MILLIGRAM(S): at 12:13

## 2019-11-19 RX ADMIN — Medication 15 MILLIGRAM(S): at 06:24

## 2019-11-19 RX ADMIN — Medication 15 MILLIGRAM(S): at 13:17

## 2019-11-19 RX ADMIN — Medication 15 MILLIGRAM(S): at 23:44

## 2019-11-19 NOTE — DISCHARGE NOTE PROVIDER - HOSPITAL COURSE
70 Y/O F with PMH of ETHAN on home CPAP, Hypothyroid, GERD, Migraine, Palpations , rheumatic fever, MVP presented for fall complicated by LOC due to head trauma and R humeral fracture. Orthopedics evaluated her, recommended sling, non weight bearing and outpatient followup. Additional trauma workup in ED negative. Patient placed on telemetry for LOC r/o syncope as well as palpitations, which is chronic and has been worked up as an outpatient. 70 Y/O F with PMH of ETHAN on home CPAP, Hypothyroid, GERD, Migraine, Palpations , rheumatic fever, MVP presented for fall complicated by LOC due to head trauma and R humeral fracture. Orthopedics evaluated her, recommended sling, non weight bearing and  will be transferred to [facility]  for surgery . Additional trauma workup in ED negative. Patient placed on telemetry for LOC r/o syncope as well as palpitations, which is chronic and has been worked up as an outpatient. 70 Y/O F with PMH of ETHAN on home CPAP, Hypothyroid, GERD, Migraine, Palpations , rheumatic fever, MVP presented for fall complicated by LOC due to head trauma and R humeral fracture. Orthopedics evaluated her, recommended sling, non weight bearing and  will be transferred to [facility]  for surgery . Additional trauma workup in ED negative. Patient placed on telemetry for LOC r/o syncope as well as palpitations, which is chronic and has been worked up as an outpatient.         Attending Attestation         Vital Signs Last 24 Hrs    T(C): 36.6 (20 Nov 2019 14:15), Max: 36.9 (20 Nov 2019 12:22)    T(F): 97.8 (20 Nov 2019 14:15), Max: 98.4 (20 Nov 2019 12:22)    HR: 75 (20 Nov 2019 14:15) (69 - 79)    BP: 121/73 (20 Nov 2019 14:15) (112/71 - 123/59)    RR: 18 (20 Nov 2019 14:15) (18 - 18)    SpO2: 100% (20 Nov 2019 14:15) (95% - 100%)                            11.0     9.75  )-----------( 197      ( 20 Nov 2019 13:03 )               33.8         11-20        131<L>  |  93<L>  |  13    ----------------------------<  99    3.8   |  27  |  0.67        Ca    8.9      20 Nov 2019 13:03    Mg     2.0     11-19        TPro  6.8  /  Alb  3.9  /  TBili  0.5  /  DBili  x   /  AST  20  /  ALT  17  /  AlkPhos  81  11-20        Meds: Per [facility]         was disruptive during rounds demanding his wife be discharged and be given IV Pain Medications before leaving for [facility] where he will be driving the patient for Humeral repair by her Drs at [place].         Nurse Manager was involved         Pt was given Toradol 30mg IV and observed per Nursing protocol before d/c to [place]

## 2019-11-19 NOTE — DISCHARGE NOTE PROVIDER - CARE PROVIDER_API CALL
Ruben Ball)  Orthopaedic Surgery  [address] [place] [address]  Phone: [phone]  Fax: [phone]  Follow Up Time:

## 2019-11-19 NOTE — DISCHARGE NOTE PROVIDER - CARE PROVIDERS DIRECT ADDRESSES
[email or web address]

## 2019-11-19 NOTE — PROGRESS NOTE ADULT - ASSESSMENT
70 Y/O F with PMH of ETHAN on home CPAP, Hypothyroid, GERD, Migraine, Palpations , rheumatic fever, MVP, hypertension? presenting s/p mechanical fall for syncope workup. she also found to have comminuted right humeral head and neck fracture.      # comminuted right humeral head and neck fracture. s/p mechanical fall  - history of palpation since she was child  , w/up done by cardio showed pvc/pac as per patient   -pt denied any hx of syncope  - orthostatic neg   -denied any Sx before or after falling  -trauma workup is non remarkable except for CT R shoulder that  showed  comminuted right humeral head and neck fracture  -ortho following NWB, sling, fu as op 1 week [phone] to discuss op verses nonop treatment   -f/u echo  - f/up cardio   -f/u EEG  -repeat troponins, EKG  -nausea and vomiting after fentanyl, oxycodone, Dilaudid and morphine. she said she tolerated Toradol well.    #hypokalemia-  replete   -follow up AM BMP    #hypertension  -on thiazide, confirm with pt for dosing  -will hold for now    #anxiety   -0.25 xanax  -benadryl 25mg before bedtime    #ETHAN on home CPAP  -asked to bring home CPAP  -following with  outpatient    #hypothyroid  -on synthroid 134    #GERD/ GI prophylaxis   -protonix    #migraine  -on bultibal at home    #hx of palpations  -as per pt she had PAC, PVC    #Hx of rheumatic fever    #DVT prophylaxis, Lovenox   #GI  prophylaxis  #dispo from home  #Diet DASH  #activity as tolerated

## 2019-11-19 NOTE — DISCHARGE NOTE PROVIDER - NSDCMRMEDTOKEN_GEN_ALL_CORE_FT
butalbital: 2 times a day, As Needed  Emergen-C oral powder for reconstitution:   hydroCHLOROthiazide 12.5 mg oral tablet: 1 tab(s) orally once a day  meclizine 25 mg oral tablet: 1 tab(s) orally 3 times a day   NexIUM:   Synthroid:   Vitamin D3: 2 tab(s) orally once a day  Xanax 0.25 mg oral tablet: 1 tab(s) orally 3 times a day

## 2019-11-19 NOTE — DISCHARGE NOTE PROVIDER - NSDCFUADDINST_GEN_ALL_CORE_FT
Please follow up with orthopedics in 1-2 weeks.   Followup with your cardiologist regarding your palpitations  Please see your PMD within 1 week  Do not place weight on right arm until cleared by orthopedics.

## 2019-11-19 NOTE — PROGRESS NOTE ADULT - SUBJECTIVE AND OBJECTIVE BOX
24H events:    Patient is a 69y old Female who presents with a chief complaint of Fall, syncope workup (2019 23:33)    Primary diagnosis of Syncope     Today is hospital day 1d.     PAST MEDICAL & SURGICAL HISTORY  Mitral valve prolapse  H/O: rheumatic fever  Herniated disc, cervical  History of palpitations  ETHAN on CPAP  Migraines  Cataract  Gastroesophageal reflux disease  Hypothyroid  Hypertension  Cataract extraction status of eye, left  H/O knee surgery: torn minsicus repair b/l knee  H/O:     SOCIAL HISTORY:  Negative for smoking/alcohol/drug use.     ALLERGIES:  codeine (Vomiting; Nausea)  cortisone (Other; Rash; Pruritus)  latex (Pruritus; Rash)  lidocaine topical (Rash)  penicillin (Red Man Synd; Rash; Pruritus)  predniSONE (Other; Rash)    MEDICATIONS:  STANDING MEDICATIONS  enoxaparin Injectable 40 milliGRAM(s) SubCutaneous daily  ketorolac   Injectable 15 milliGRAM(s) IV Push every 6 hours  levothyroxine 137 MICROGram(s) Oral daily  pantoprazole    Tablet 40 milliGRAM(s) Oral before breakfast    PRN MEDICATIONS  acetaminophen   Tablet .. 650 milliGRAM(s) Oral every 6 hours PRN  ALPRAZolam 0.25 milliGRAM(s) Oral at bedtime PRN  diphenhydrAMINE 25 milliGRAM(s) Oral once PRN    VITALS:   T(F): 96.8  HR: 77  BP: 141/76  RR: 18  SpO2: 97%    LABS:                        11.2   10.28 )-----------( 231      ( 2019 06:09 )             33.0         136  |  96<L>  |  18  ----------------------------<  108<H>  4.0   |  26  |  0.7    Ca    8.6      2019 06:09  Mg     2.0         TPro  7.4  /  Alb  4.4  /  TBili  <0.2  /  DBili  x   /  AST  24  /  ALT    /  AlkPhos  96          Troponin T, Serum: <0.01 ng/mL (19 @ 15:31)      CARDIAC MARKERS ( 2019 15:31 )  x     / <0.01 ng/mL / x     / x     / x          RADIOLOGY:< from: CT Chest No Cont (19 @ 18:37) >  IMPRESSION: No evidence of intrathoracic or intra-abdominal/pelvic   visceral laceration or hemorrhage.  Comminuted fracture of the right   humeral head and neck     < end of copied text >    < from: CT Shoulder No Cont, Right (19 @ 18:37) >  IMPRESSION:    Displaced and comminuted right humeral head and neck fracture.    < end of copied text >    < from: CT Cervical Spine No Cont (19 @ 18:37) >  IMPRESSION:    No acute cervical spine injury.  Diffuse osteopenia. Multilevel degenerative changes of the spine with   intervertebral disc space narrowing.at C4-5 to C6-7.    Chronic opacification of the left mastoids    < end of copied text >    < from: CT Head No Cont (19 @ 10:44) >  Impression:      No acute intracranial hemorrhage or mass effect.    Left middle ear opacification and chronic left mastoid opacification.   Correlate to exclude otomastoiditis.    < end of copied text >      PHYSICAL EXAM:  GENERAL: NAD, lying in bed comfortably  HEAD:  Atraumatic, Normocephalic  EYES: EOMI, PERRLA, conjunctiva and sclera clear  ENT: Moist mucous membranes  NECK: Supple, No JVD  CHEST/LUNG: Clear to auscultation bilaterally; No rales, rhonchi, wheezing, or rubs. Unlabored respirations  HEART: Regular rate and rhythm; No murmurs, rubs, or gallops  ABDOMEN: Bowel sounds present; Soft, Nontender, Nondistended.   EXTREMITIES:  2+ Peripheral Pulses, brisk capillary refill. No clubbing, cyanosis, or edema  NERVOUS SYSTEM:  Alert & Oriented X3, speech clear. No deficits   MSK: right arm sling

## 2019-11-19 NOTE — DISCHARGE NOTE PROVIDER - NSDCCPCAREPLAN_GEN_ALL_CORE_FT
PRINCIPAL DISCHARGE DIAGNOSIS  Diagnosis: Fall  Assessment and Plan of Treatment:       SECONDARY DISCHARGE DIAGNOSES  Diagnosis: Humerus fracture  Assessment and Plan of Treatment: PRINCIPAL DISCHARGE DIAGNOSIS  Diagnosis: Fall  Assessment and Plan of Treatment: ct head was negative , cardiac monitoring showed no abnormalities .      SECONDARY DISCHARGE DIAGNOSES  Diagnosis: Humerus fracture  Assessment and Plan of Treatment: sling and pain medication were given.  f/up with orthopedics

## 2019-11-20 ENCOUNTER — INPATIENT (INPATIENT)
Facility: HOSPITAL | Age: 69
LOS: 3 days | Discharge: HOME | DRG: 493 | End: 2019-11-24
Attending: ORTHOPAEDIC SURGERY | Admitting: ORTHOPAEDIC SURGERY
Payer: MEDICARE

## 2019-11-20 ENCOUNTER — TRANSCRIPTION ENCOUNTER (OUTPATIENT)
Age: 69
End: 2019-11-20

## 2019-11-20 VITALS — OXYGEN SATURATION: 95 %

## 2019-11-20 VITALS
TEMPERATURE: 98 F | WEIGHT: 199.96 LBS | OXYGEN SATURATION: 100 % | DIASTOLIC BLOOD PRESSURE: 71 MMHG | RESPIRATION RATE: 18 BRPM | HEART RATE: 79 BPM | SYSTOLIC BLOOD PRESSURE: 112 MMHG

## 2019-11-20 DIAGNOSIS — Z98.891 HISTORY OF UTERINE SCAR FROM PREVIOUS SURGERY: Chronic | ICD-10-CM

## 2019-11-20 DIAGNOSIS — E87.1 HYPO-OSMOLALITY AND HYPONATREMIA: ICD-10-CM

## 2019-11-20 DIAGNOSIS — D64.9 ANEMIA, UNSPECIFIED: ICD-10-CM

## 2019-11-20 DIAGNOSIS — I34.1 NONRHEUMATIC MITRAL (VALVE) PROLAPSE: ICD-10-CM

## 2019-11-20 DIAGNOSIS — Z98.890 OTHER SPECIFIED POSTPROCEDURAL STATES: Chronic | ICD-10-CM

## 2019-11-20 DIAGNOSIS — I10 ESSENTIAL (PRIMARY) HYPERTENSION: ICD-10-CM

## 2019-11-20 DIAGNOSIS — G43.909 MIGRAINE, UNSPECIFIED, NOT INTRACTABLE, WITHOUT STATUS MIGRAINOSUS: ICD-10-CM

## 2019-11-20 DIAGNOSIS — Z98.42 CATARACT EXTRACTION STATUS, LEFT EYE: Chronic | ICD-10-CM

## 2019-11-20 DIAGNOSIS — E03.9 HYPOTHYROIDISM, UNSPECIFIED: ICD-10-CM

## 2019-11-20 DIAGNOSIS — G47.33 OBSTRUCTIVE SLEEP APNEA (ADULT) (PEDIATRIC): ICD-10-CM

## 2019-11-20 DIAGNOSIS — K21.9 GASTRO-ESOPHAGEAL REFLUX DISEASE WITHOUT ESOPHAGITIS: ICD-10-CM

## 2019-11-20 DIAGNOSIS — S42.309A UNSPECIFIED FRACTURE OF SHAFT OF HUMERUS, UNSPECIFIED ARM, INITIAL ENCOUNTER FOR CLOSED FRACTURE: ICD-10-CM

## 2019-11-20 DIAGNOSIS — S42.291A OTHER DISPLACED FRACTURE OF UPPER END OF RIGHT HUMERUS, INITIAL ENCOUNTER FOR CLOSED FRACTURE: ICD-10-CM

## 2019-11-20 DIAGNOSIS — G47.30 SLEEP APNEA, UNSPECIFIED: ICD-10-CM

## 2019-11-20 PROBLEM — Z86.79 PERSONAL HISTORY OF OTHER DISEASES OF THE CIRCULATORY SYSTEM: Chronic | Status: ACTIVE | Noted: 2019-11-19

## 2019-11-20 LAB
BLD GP AB SCN SERPL QL: NEGATIVE — SIGNIFICANT CHANGE UP
HCV AB S/CO SERPL IA: 0.2 S/CO — SIGNIFICANT CHANGE UP (ref 0–0.99)
HCV AB SERPL-IMP: SIGNIFICANT CHANGE UP
RH IG SCN BLD-IMP: POSITIVE — SIGNIFICANT CHANGE UP

## 2019-11-20 PROCEDURE — 93010 ELECTROCARDIOGRAM REPORT: CPT

## 2019-11-20 PROCEDURE — 71045 X-RAY EXAM CHEST 1 VIEW: CPT | Mod: 26

## 2019-11-20 PROCEDURE — 99238 HOSP IP/OBS DSCHRG MGMT 30/<: CPT

## 2019-11-20 PROCEDURE — 99285 EMERGENCY DEPT VISIT HI MDM: CPT

## 2019-11-20 PROCEDURE — 99222 1ST HOSP IP/OBS MODERATE 55: CPT

## 2019-11-20 RX ORDER — ACETAMINOPHEN 500 MG
1000 TABLET ORAL ONCE
Refills: 0 | Status: COMPLETED | OUTPATIENT
Start: 2019-11-20 | End: 2019-11-20

## 2019-11-20 RX ORDER — ONDANSETRON 8 MG/1
4 TABLET, FILM COATED ORAL EVERY 6 HOURS
Refills: 0 | Status: DISCONTINUED | OUTPATIENT
Start: 2019-11-20 | End: 2019-11-24

## 2019-11-20 RX ORDER — SODIUM CHLORIDE 9 MG/ML
1000 INJECTION, SOLUTION INTRAVENOUS
Refills: 0 | Status: DISCONTINUED | OUTPATIENT
Start: 2019-11-20 | End: 2019-11-22

## 2019-11-20 RX ORDER — TRAMADOL HYDROCHLORIDE 50 MG/1
50 TABLET ORAL ONCE
Refills: 0 | Status: DISCONTINUED | OUTPATIENT
Start: 2019-11-20 | End: 2019-11-20

## 2019-11-20 RX ORDER — KETOROLAC TROMETHAMINE 30 MG/ML
30 SYRINGE (ML) INJECTION ONCE
Refills: 0 | Status: DISCONTINUED | OUTPATIENT
Start: 2019-11-20 | End: 2019-11-20

## 2019-11-20 RX ORDER — HYDROCHLOROTHIAZIDE 25 MG
12.5 TABLET ORAL DAILY
Refills: 0 | Status: DISCONTINUED | OUTPATIENT
Start: 2019-11-20 | End: 2019-11-20

## 2019-11-20 RX ORDER — ALPRAZOLAM 0.25 MG
0.25 TABLET ORAL AT BEDTIME
Refills: 0 | Status: DISCONTINUED | OUTPATIENT
Start: 2019-11-20 | End: 2019-11-23

## 2019-11-20 RX ORDER — PANTOPRAZOLE SODIUM 20 MG/1
40 TABLET, DELAYED RELEASE ORAL
Refills: 0 | Status: DISCONTINUED | OUTPATIENT
Start: 2019-11-20 | End: 2019-11-24

## 2019-11-20 RX ORDER — LEVOTHYROXINE SODIUM 125 MCG
137 TABLET ORAL DAILY
Refills: 0 | Status: DISCONTINUED | OUTPATIENT
Start: 2019-11-20 | End: 2019-11-24

## 2019-11-20 RX ORDER — ACETAMINOPHEN 500 MG
650 TABLET ORAL EVERY 6 HOURS
Refills: 0 | Status: DISCONTINUED | OUTPATIENT
Start: 2019-11-20 | End: 2019-11-22

## 2019-11-20 RX ADMIN — Medication 30 MILLIGRAM(S): at 10:01

## 2019-11-20 RX ADMIN — Medication 650 MILLIGRAM(S): at 21:10

## 2019-11-20 RX ADMIN — Medication 400 MILLIGRAM(S): at 12:56

## 2019-11-20 RX ADMIN — Medication 137 MICROGRAM(S): at 05:55

## 2019-11-20 RX ADMIN — Medication 650 MILLIGRAM(S): at 20:10

## 2019-11-20 RX ADMIN — Medication 0.25 MILLIGRAM(S): at 22:27

## 2019-11-20 NOTE — CONSULT NOTE ADULT - ATTENDING COMMENTS
Pre-operative assessment  RCRI of 0, METS >4, Intermediate risk procedure  Combined clinical and surgical risk is low. EKG NSR-reviewed by me.  Recommend to hold off on HCTZ for now given hyponatremia and adequate BP's  C/w CPAP use  No other testings indicated at this time, medically optimize for plan procedure  Rest as above  Thanks for the consult and will continue to follow up with you.

## 2019-11-20 NOTE — CONSULT NOTE ADULT - SUBJECTIVE AND OBJECTIVE BOX
HPI: 69 year old female with history of rheumatic fever, mitral valve prolapse, hypothyroidism, HTN, migraines, GERD and sleep apnea who presents after a mechanical fall.  The patient states that on 11/18, she had a mechanical fall due to the footwear she was wearing-denies any prodromal symptoms such as chest pain, palpitations or shortness of breath and denies any loss of consciousness.  She states that she hit her head and right shoulder on a doorjamb.  She went to her local hospital on [address] and was found to have a right humeral head/neck fracture.  She was transferred to [facility] for operative management.    REVIEW OF SYSTEMS:    CONSTITUTIONAL: No fevers or chills  EYES/ENT: No visual changes;  No vertigo or throat pain   NECK: No pain or stiffness  RESPIRATORY: No cough, wheezing, hemoptysis; No shortness of breath  CARDIOVASCULAR: No chest pain, does endorse palpitations which she gets when she's nervous  GASTROINTESTINAL: No abdominal or epigastric pain. No nausea, vomiting, or hematemesis; No diarrhea or constipation. No melena or hematochezia.  GENITOURINARY: No dysuria, frequency or hematuria  NEUROLOGICAL: No numbness or weakness  MSK: R shoulder pain and decreased ROM  SKIN: No itching, burning, rashes, or lesions  HEME: No bruising or bleeding  ALLERGY: No hives or throat swelling      PMH: Rheumatic fever, mitral valve prolapse, hypothyroidism, HTN, migraines, GERD and sleep apnea    PSH: C section, bilateral knee meniscus repair    FH: Mother had cardiac arrest and breast CA, sister had breast CA    SH: Denies alcohol, drug, tobacco use    ALLERGIES: Latex, PCN, codeine, cortisone, lidocaine, prednisone    MEDICATIONS: Synthroid 137mcg, HCTZ 25, Nexium 20mg, butalbital 325mg PRN    VITAL SIGNS:  ICU Vital Signs Last 24 Hrs  T(C): 36.2 (20 Nov 2019 17:20), Max: 36.9 (20 Nov 2019 12:22)  T(F): 97.2 (20 Nov 2019 17:20), Max: 98.4 (20 Nov 2019 12:22)  HR: 78 (20 Nov 2019 17:20) (69 - 79)  BP: 125/70 (20 Nov 2019 17:20) (112/71 - 125/70)  BP(mean): --  ABP: --  ABP(mean): --  RR: 18 (20 Nov 2019 17:20) (18 - 18)  SpO2: 97% (20 Nov 2019 17:20) (95% - 100%)    CAPILLARY BLOOD GLUCOSE          PHYSICAL EXAM:  Constitutional: WDWN female resting comfortably in bed, NAD  HEENT: NC/AT; PERRL, anicteric sclera; no oropharyngeal erythema or exudates; MMM  Neck: supple, no appreciable JVD  Respiratory: CTA B/L, no W/R/R; respirations appear non-labored, conversive in full sentences  Cardiovascular: +S1/S2, RRR, soft systolic murmur  Gastrointestinal: abdomen soft, NT/ND  Extremities: WWP; no clubbing, cyanosis or edema  Vascular: 2+ radial, femoral, and DP/PT pulses B/L  Dermatologic: skin normal color and turgor; no visible rashes  Musculoskeletal: R arm in sling  Neurological: AAOX3  Psych: Pleasant affect    LABS:                        11.0   9.75  )-----------( 197      ( 20 Nov 2019 13:03 )             33.8     11-20    131<L>  |  93<L>  |  13  ----------------------------<  99  3.8   |  27  |  0.67    Ca    8.9      20 Nov 2019 13:03  Mg     2.0     11-19    TPro  6.8  /  Alb  3.9  /  TBili  0.5  /  DBili  x   /  AST  20  /  ALT  17  /  AlkPhos  81  11-20    PT/INR - ( 20 Nov 2019 13:03 )   PT: 12.4 sec;   INR: 1.09          PTT - ( 20 Nov 2019 13:03 )  PTT:29.2 sec        Urinalysis Basic - ( 20 Nov 2019 13:47 )    Color: Yellow / Appearance: Clear / SG: <=1.005 / pH: x  Gluc: x / Ketone: NEGATIVE  / Bili: Negative / Urobili: 0.2 E.U./dL   Blood: x / Protein: NEGATIVE mg/dL / Nitrite: NEGATIVE   Leuk Esterase: Trace / RBC: < 5 /HPF / WBC < 5 /HPF   Sq Epi: x / Non Sq Epi: 0-5 /HPF / Bacteria: Present /HPF    < from: CT Shoulder No Cont, Right (11.18.19 @ 18:37) >  EXAM:  CT SHOULDER ONLY RT            PROCEDURE DATE:  11/18/2019            INTERPRETATION:  CT OF THE RIGHT SHOULDER WITHOUT CONTRAST    CLINICAL HISTORY: Trauma    TECHNIQUE: Images were obtained of the right shoulder without contrast.   [place] sagittal reformatted images were also provided.    COMPARISON: None available    FINDINGS:    BONES/JOINTS: Displaced and comminuted right humeral head and neck   fracture with slight impaction of the fragments.There is avulsion of the   greater tuberosity.     The humeral head is slightly displaced from the glenoid fossa    Clavicle, scapula, and acromioclavicular joints are intact.     Diffuse osteopenia.    SOFT TISSUES: Right shoulder subcutaneous soft tissue swelling.    IMPRESSION:    Displaced and comminuted right humeral head and neck fracture.              MARIELENA SOTO M.D., RESIDENT RADIOLOGIST  This document has been electronically signed.  RAYMOND MORALES M.D., ATTENDING RADIOLOGIST  This document has been electronically signed. Nov 18 2019  8:12PM    < end of copied text >

## 2019-11-20 NOTE — ED ADULT NURSE NOTE - CHIEF COMPLAINT QUOTE
pt states she was sent from [facility] to meet orthopedic team at [facility] for admission and surgery for broke right shoulder s/p trip and fall Tuesday was last given Toradol at 10:30AM

## 2019-11-20 NOTE — CONSULT NOTE ADULT - PROBLEM SELECTOR RECOMMENDATION 2
-Patient with history of HTN, takes HCTZ 25mg at home, due to hyponatremia and surgery tomorrow, would hold for now.

## 2019-11-20 NOTE — CONSULT NOTE ADULT - PROBLEM SELECTOR RECOMMENDATION 8
-Patient found to have sodium of 131 and chloride of 93, does endorse decreased PO intake as patient has been receiving pain medications at [facility] that made her vomit, likely due to hypovolemia and HCTZ use, will continue to monitor, suspect it will improve as patient takes more PO.  If not, can obtain serum osm and urine studies for further workup.

## 2019-11-20 NOTE — H&P ADULT - BIRTH SEX
Dr. Michael Rdz and Dr. Nain Reynoso and the [facility] staff and person and with the patient, his spouse and sister in person Dr. Ya
primary team housestaff
Female

## 2019-11-20 NOTE — H&P ADULT - NSICDXPASTMEDICALHX_GEN_ALL_CORE_FT
Phone call from patient stating she has an appt with Dr. Jessica Turk on 9/20/18 @ 1:15.
PAST MEDICAL HISTORY:  Cataract     Gastroesophageal reflux disease     H/O: rheumatic fever     Herniated disc, cervical     History of palpitations     Hypertension     Hypothyroid     Migraines     Mitral valve prolapse     ETHAN on CPAP

## 2019-11-20 NOTE — DISCHARGE NOTE NURSING/CASE MANAGEMENT/SOCIAL WORK - PATIENT PORTAL LINK FT
You can access the FollowMyHealth Patient Portal offered by [facility] by registering at the following website: [email or web address]. By joining [facility]’s FollowMyHealth portal, you will also be able to view your health information using other applications (apps) compatible with our system.

## 2019-11-20 NOTE — CONSULT NOTE ADULT - PROBLEM SELECTOR RECOMMENDATION 5
-Patient with history of migraines for which she takes butalbital PRN, patient currently receiving tylenol.

## 2019-11-20 NOTE — CONSULT NOTE ADULT - PROBLEM SELECTOR RECOMMENDATION 9
-Patient presenting after mechanical fall into door jamb with R humerus head and neck fracture.  Patient for OR tomorrow.  RCRI score 0-class 1 risk, 3.9% 30 day risk of death, MI or cardiac arrest.  Greater than 4 METS.  EKG with non specific ST changes that present on previous EKG.  Patient low risk for moderate risk procedure. -Patient with downtrend of hemoglobin-was 13.8 and 11.2 at [place] and on arrival to [facility] was 11-no signs/symptoms of active bleed and patient hemodynamically stable.  Maintain active type and screen and continue to trend CBC.

## 2019-11-20 NOTE — H&P ADULT - NSHPLABSRESULTS_GEN_ALL_CORE
Preop labs, imaging, EKG pending review by medical clearance    < from: Xray Shoulder 2 Views, Right (11.18.19 @ 15:47) >          INTERPRETATION:  Clinical History/Reason For Exam: Trauma, pain, fracture.    Comparison: None.    Procedure: XR SHOULDER COMPLETE 2 VIEWS RIGHT    Findings:  Thereis an acute transverse fracture of the right humeral neck with   approximately 15 mm displacement. There is a small adjacent bone   fragment. There is no evidence of glenohumeral joint dislocation. The   bones are osteopenic. Please see concurrently performed chest radiograph   for evaluation of the lungs.    Impression:  Right humeral neck fracture as above.    < end of copied text >    < from: CT Shoulder No Cont, Right (11.18.19 @ 18:37) >      EXAM:  CT SHOULDER ONLY RT            PROCEDURE DATE:  11/18/2019            INTERPRETATION:  CT OF THE RIGHT SHOULDER WITHOUT CONTRAST    CLINICAL HISTORY: Trauma    TECHNIQUE: Images were obtained of the right shoulder without contrast.   Coronaland sagittal reformatted images were also provided.    COMPARISON: None available    FINDINGS:    BONES/JOINTS: Displaced and comminuted right humeral head and neck   fracture with slight impaction of the fragments.There is avulsion of the   greater tuberosity.     The humeral head is slightly displaced from the glenoid fossa    Clavicle, scapula, and acromioclavicular joints are intact.     Diffuse osteopenia.    SOFT TISSUES: Right shoulder subcutaneous soft tissue swelling.    IMPRESSION:    Displaced and comminuted right humeral head and neck fracture.              MARIELENA SOTO M.D., RESIDENT RADIOLOGIST  This document has been electronically signed.  RAYMOND MORALES M.D., ATTENDING RADIOLOGIST  This document has been electronically signed. Nov 18 2019  8:12PM                < end of copied text >

## 2019-11-20 NOTE — H&P ADULT - NSHPPHYSICALEXAM_GEN_ALL_CORE
Gen: NAD lying comfortably in hospital bed  MSK: Right upper extremity in sling. No open wounds, skin breaks, lesions to right upper extremity. Sensation intact to right upper extremity. Radial pulse palpable right upper extremity.  strength intact right upper extremity. PIN/AIN/ulnar nerves intact right upper extremity. Range of motion not tested due to known fracture.

## 2019-11-20 NOTE — PATIENT PROFILE ADULT - HARM RISK FACTORS
Assessment    1  Encounter for preventive health examination (V70 0) (Z00 00)   2  Cutaneous candidiasis (112 3) (B37 2)    Plan  Colon cancer screening    · COLONOSCOPY; Status:Active; Requested for:12Apr2016;   Cutaneous candidiasis    · Clotrimazole-Betamethasone 1-0 05 % External Cream; APPLY  AND RUB  IN A  THIN FILM TO AFFECTED AREAS TWICE DAILY  (AM AND PM)  Prostate cancer screening, Screening for diabetes mellitus, Screening for hyperlipidemia    · (1) COMPREHENSIVE METABOLIC PANEL; Status:Active; Requested for:12Apr2016;    · (1) LIPID PANEL, FASTING; Status:Active; Requested for:12Apr2016;    · (1) PSA (SCREEN) (Dx V76 44 Screen for Prostate Cancer); Status:Active; Requested  for:12Apr2016;     Discussion/Summary  Impression: health maintenance visit  Currently, he eats a healthy diet  Prostate cancer screening: PSA was ordered  Colorectal cancer screening: colonoscopy has been ordered  Screening lab work includes glucose and lipid profile  The risks and benefits of immunizations were discussed  Advice and education were given regarding nutrition, reproductive health and cardiovascular risk reduction  Patient discussion: discussed with the patient  Chief Complaint  Pt  here for yearly check up  He reports no changes  NSR      History of Present Illness  HM, Adult Male: The patient is being seen for a health maintenance evaluation  General Health: The patient's health since the last visit is described as good  He has regular dental visits  He denies vision problems  He denies hearing loss  Immunizations status: up to date  Lifestyle:  He consumes a diverse and healthy diet  He does not have any weight concerns  He exercises regularly  He uses tobacco  He denies alcohol use  He denies drug use  Screening: cancer screening reviewed and current  metabolic screening reviewed and current  risk screening reviewed and current        Review of Systems    Constitutional: No fever or chills, feels well, no tiredness, no recent weight gain or weight loss  Eyes: No complaints of eye pain, no red eyes, no discharge from eyes, no itchy eyes  ENT: no complaints of earache, no hearing loss, no nosebleeds, no nasal discharge, no sore throat, no hoarseness  Cardiovascular: No complaints of slow heart rate, no fast heart rate, no chest pain, no palpitations, no leg claudication, no lower extremity  Respiratory: No complaints of shortness of breath, no wheezing, no cough, no SOB on exertion, no orthopnea or PND  Gastrointestinal: No complaints of abdominal pain, no constipation, no nausea or vomiting, no diarrhea or bloody stools  Genitourinary: No complaints of dysuria, no incontinence, no hesitancy, no nocturia, no genital lesion, no testicular pain  Musculoskeletal: No complaints of arthralgia, no myalgias, no joint swelling or stiffness, no limb pain or swelling  Integumentary: itching  Neurological: No compliants of headache, no confusion, no convulsions, no numbness or tingling, no dizziness or fainting, no limb weakness, no difficulty walking  Psychiatric: Is not suicidal, no sleep disturbances, no anxiety or depression, no change in personality, no emotional problems  Endocrine: No complaints of proptosis, no hot flashes, no muscle weakness, no erectile dysfunction, no deepening of the voice, no feelings of weakness  Hematologic/Lymphatic: No complaints of swollen glands, no swollen glands in the neck, does not bleed easily, no easy bruising  Active Problems    1  Constipation (564 00) (K59 00)   2  Encounter for screening colonoscopy (V76 51) (Z12 11)   3  Tinea of nail (110 1) (B35 1)   4  Varicose veins of legs (454 9) (I83 93)   5   Viral illness (079 99) (B34 9)    Surgical History    · History of Amputation Of Index Finger, With Neurectomy (Each)   · History of Amputation Of Middle Finger, With Neurectomy (Each)   · History of Post Spinal Diskect Osteophytect Lumb Interspace Microdiscec    Family History    · Family history of Cancer (199 1) (C80 1)   · Family history of Heart disease (429 9) (I51 9)   · Family history of Hypertension (401 9) (I10)   · Family history of Prostate cancer (80) (C61)   · Family history of Stroke (434 91) (I63 9)   · Family history of Thyroid trouble (246 9) (E07 9)    · Family history of Asthma (493 90) (J45 909)    · Family history of Prostate cancer (80) (C61)    Social History    · Always uses seat belt   · Consumes alcohol occasionally   · Current every day smoker   · Full-time employment   ·    · No drug use    Current Meds   1  No Reported Medications Recorded    Allergies    1  No Known Drug Allergies    Vitals   Recorded: 12Apr2016 05:52PM   Heart Rate 94   Systolic 625   Diastolic 78   Height 5 ft 8 in   Weight 198 lb    BMI Calculated 30 11   BSA Calculated 2 04   O2 Saturation 98     Physical Exam    Constitutional   General appearance: No acute distress, well appearing and well nourished  Eyes   Conjunctiva and lids: No erythema, swelling or discharge  Pupils and irises: Equal, round, reactive to light  Ophthalmoscopic examination: Normal fundi and optic discs  Ears, Nose, Mouth, and Throat   External inspection of ears and nose: Normal     Otoscopic examination: Tympanic membranes translucent with normal light reflex  Canals patent without erythema  Hearing: Normal     Nasal mucosa, septum, and turbinates: Normal without edema or erythema  Lips, teeth, and gums: Normal, good dentition  Oropharynx: Normal with no erythema, edema, exudate or lesions  Neck   Neck: Supple, symmetric, trachea midline, no masses  Thyroid: Normal, no thyromegaly  Pulmonary   Respiratory effort: No increased work of breathing or signs of respiratory distress  Percussion of chest: Normal     Palpation of chest: Normal     Auscultation of lungs: Clear to auscultation      Cardiovascular   Palpation of heart: Normal PMI, no thrills  Auscultation of heart: Normal rate and rhythm, normal S1 and S2, no murmurs  Carotid pulses: 2+ bilaterally  Abdominal aorta: Normal     Femoral pulses: 2+ bilaterally  Pedal pulses: 2+ bilaterally  Examination of extremities for edema and/or varicosities: Normal     Chest   Breasts: Normal, no dimpling or skin changes appreciated  Palpation of breasts and axillae: Normal, no masses palpated  Chest: Normal     Abdomen   Abdomen: Non-tender, no masses  Liver and spleen: No hepatomegaly or splenomegaly  Examination for hernias: No hernias appreciated  Anus, perineum, and rectum: Abnormal   External hemorrhoid(s) were present  Stool sample for occult blood: Negative  Lymphatic   Palpation of lymph nodes in neck: No lymphadenopathy  Palpation of lymph nodes in axillae: No lymphadenopathy  Palpation of lymph nodes in groin: No lymphadenopathy  Palpation of lymph nodes in other areas: No lymphadenopathy  Musculoskeletal   Gait and station: Normal     Inspection/palpation of digits and nails: Normal without clubbing or cyanosis  Inspection/palpation of joints, bones, and muscles: Normal     Range of motion: Normal     Stability: Normal     Muscle strength/tone: Normal     Skin   Skin and subcutaneous tissue: Abnormal   erythematous rash in perianal area  Palpation of skin and subcutaneous tissue: Normal turgor  Neurologic   Cranial nerves: Cranial nerves 2-12 intact  Reflexes: 2+ and symmetric  Sensation: No sensory loss  Psychiatric   Judgment and insight: Normal     Orientation to person, place and time: Normal     Recent and remote memory: Intact  Mood and affect: Normal        Signatures   Electronically signed by : Denisse Luna DO;  Apr 12 2016  6:28PM EST                       (Author) yes

## 2019-11-20 NOTE — ED ADULT NURSE NOTE - OBJECTIVE STATEMENT
pt states she was sent from [facility] to meet orthopedic team at [facility] for admission and surgery for broke right shoulder s/p trip and fall Tuesday was last given Toradol at 10:30AM. s/p fall yesterday unto right side. positive loc for "a few seconds". denies any headache, dizziness, visual changes. c.o mild nausea but states she does not want medication at this time for nausea. pt educated to notify staff when medications are wanted. no distress noted. bruising noted to right upper extremity. denies any numbness/tingling to right upper extremity. <2 sec cap refill and +3 radial pulse noted to right upper extremity. states she received one dose of Lovenox in [facility] yesterday at mid day.

## 2019-11-20 NOTE — ED PROVIDER NOTE - CLINICAL SUMMARY MEDICAL DECISION MAKING FREE TEXT BOX
70 y/o F with a PMHx of palpitations followed by cardiology, migraine head aches, GERD, hypothyroidism, and HTN presents with right humeral fracture. Pt was seen and evaluated at [facility] on Tuesday after a trip and fall into a door frame. Pt at that time had a trauma workup which was positive for a right humeral head fracture. She was then discharged to [facility] for surgical repair. On exam, pt appears well and nontoxic with her right arm in a sling. Distal sensation intact. Full ROM at hand and wrist joint. Lungs are clear to auscultation. Heart is regular rate and rhythm. Pt is medicated with Tylenol and (narcotics sensitive) orthopedics consulted.

## 2019-11-20 NOTE — H&P ADULT - PROBLEM SELECTOR PLAN 1
Admit to Orthopaedic Service Dr. Collado  Consent obtained for OR tomorrow for right humerus fracture ORIF   NPO/IVF at midnight   Medicine consult for medical clearance/optimization for surgery  Pain control  NWB right upper extremity in sling

## 2019-11-20 NOTE — ED PROVIDER NOTE - PHYSICAL EXAMINATION
General: Patient is well developed and well nourised. Patient is alert and oriented to person, place and date. Patient is laying comfortably in stretcher and appears in no acute distress.  HEENT: Head is normocephalic and atraumatic. Pupils are equal, round and reactive. Extraocular movements intact. No evidence of nystagmus, conjunctival injection, or scleral icterus. External ears symmetric without evidence of discharge.  Nose is symmetric, non-tender, patent without evidence of discharge. Teeth in good repair. Uvula midline.   Neck: Supple with no evidence of lymphadenopathy.  Full range of motion.  Heart: Regular rate and rhythm. No murmurs, rubs or gallops.   Lungs: Clear to auscultation bilaterally with equal chest expansion. No note of wheezes, rhonchi, rales. Equal chest expansion. No note of retractions.  Abdomen: Bowel sounds present in all four quadrants. Soft, non-tender, non-distended without signs of masses, rebound or guarding. No note of hepatosplenomegaly. No CVA tenderness bilaterally. Negative Bello sign. No pain present over McBurney's point.  Musculoskeletal: right arm in slign. distal sensation intact. No edema, erythema, ecchymosis, atrophy or deformity. Full range of motion in all other extremities.  No clubbing or cyanosis.  Neuro:  GCS 15. Moving all extremities without discomfort. SSensation intact in all four extremities. gait steady   Skin: Warm, dry and intact without evidence of rashes, bruising, pallor, jaundice or cyanosis.   Psych: Mood and affect appropriate.

## 2019-11-20 NOTE — ED ADULT TRIAGE NOTE - CHIEF COMPLAINT QUOTE
pt states she was sent from [facility] to meet orthopedic team at [facility] for admission and surgery for broke right shoulder s/p trip and fall Tuesday was last given Tramadol at 10:30AM pt states she was sent from [facility] to meet orthopedic team at [facility] for admission and surgery for broke right shoulder s/p trip and fall Tuesday was last given Toradol at 10:30AM

## 2019-11-20 NOTE — ED PROVIDER NOTE - OBJECTIVE STATEMENT
70 y/o F with a PMHx of palpitations followed by cardiology, migraine head aches, GERD, hypothyroidism, and HTN presents with right humeral fracture. Pt was seen and evaluated at [facility] on Tuesday after a trip and fall into a door frame. Pt at that time had a trauma workup which was positive for a right humeral head fracture. She was then discharged to [facility] for surgical repair. Dr. Collado and Dr. Melo. Pt denies CP, coughs, and SOB. 68 y/o F with a PMHx of palpitations followed by cardiology, migraine head aches, GERD, hypothyroidism, and HTN presents with right humeral fracture. Pt was seen and evaluated at [facility] on Tuesday after a trip and fall into a door frame. Pt at that time had a trauma workup which was positive for a right humeral head fracture. She was then discharged to [facility] for surgical repair. Dr. Collado and Dr. Melo. Pt denies CP, coughs, and SOB. states that she otherwise feels well.

## 2019-11-20 NOTE — H&P ADULT - HISTORY OF PRESENT ILLNESS
69F presents with right shoulder pain x 2 days. The patient states that she tripped and fell on 11/18/19 and fell on her right side into the door. She states that she hit her head but denies loss of consciousness. The patient went to [facility] ER after her fall and was admitted but decided to come to [facility] today for definitive treatment by Dr. Collado. The patient complains mainly of right shoulder pain and difficulty lifting her arm. She has been wearing a sling since the fall. Denies numbness/tingling of the upper extremities, pain in any other joints, prior fracture/dislocation.

## 2019-11-21 DIAGNOSIS — K21.9 GASTRO-ESOPHAGEAL REFLUX DISEASE WITHOUT ESOPHAGITIS: ICD-10-CM

## 2019-11-21 DIAGNOSIS — Z01.818 ENCOUNTER FOR OTHER PREPROCEDURAL EXAMINATION: ICD-10-CM

## 2019-11-21 DIAGNOSIS — G47.33 OBSTRUCTIVE SLEEP APNEA (ADULT) (PEDIATRIC): ICD-10-CM

## 2019-11-21 DIAGNOSIS — I10 ESSENTIAL (PRIMARY) HYPERTENSION: ICD-10-CM

## 2019-11-21 DIAGNOSIS — I49.3 VENTRICULAR PREMATURE DEPOLARIZATION: ICD-10-CM

## 2019-11-21 LAB
ANION GAP SERPL CALC-SCNC: 7 MMOL/L — SIGNIFICANT CHANGE UP (ref 5–17)
BASOPHILS # BLD AUTO: 0.03 K/UL — SIGNIFICANT CHANGE UP (ref 0–0.2)
BASOPHILS NFR BLD AUTO: 0.4 % — SIGNIFICANT CHANGE UP (ref 0–2)
BUN SERPL-MCNC: 10 MG/DL — SIGNIFICANT CHANGE UP (ref 7–23)
CALCIUM SERPL-MCNC: 8.7 MG/DL — SIGNIFICANT CHANGE UP (ref 8.4–10.5)
CHLORIDE SERPL-SCNC: 100 MMOL/L — SIGNIFICANT CHANGE UP (ref 96–108)
CO2 SERPL-SCNC: 30 MMOL/L — SIGNIFICANT CHANGE UP (ref 22–31)
CREAT SERPL-MCNC: 0.72 MG/DL — SIGNIFICANT CHANGE UP (ref 0.5–1.3)
EOSINOPHIL # BLD AUTO: 0.08 K/UL — SIGNIFICANT CHANGE UP (ref 0–0.5)
EOSINOPHIL NFR BLD AUTO: 1.1 % — SIGNIFICANT CHANGE UP (ref 0–6)
GLUCOSE SERPL-MCNC: 109 MG/DL — HIGH (ref 70–99)
HCT VFR BLD CALC: 33.2 % — LOW (ref 34.5–45)
HGB BLD-MCNC: 11 G/DL — LOW (ref 11.5–15.5)
IMM GRANULOCYTES NFR BLD AUTO: 0.3 % — SIGNIFICANT CHANGE UP (ref 0–1.5)
LYMPHOCYTES # BLD AUTO: 1.73 K/UL — SIGNIFICANT CHANGE UP (ref 1–3.3)
LYMPHOCYTES # BLD AUTO: 24 % — SIGNIFICANT CHANGE UP (ref 13–44)
MCHC RBC-ENTMCNC: 30.6 PG — SIGNIFICANT CHANGE UP (ref 27–34)
MCHC RBC-ENTMCNC: 33.1 GM/DL — SIGNIFICANT CHANGE UP (ref 32–36)
MCV RBC AUTO: 92.2 FL — SIGNIFICANT CHANGE UP (ref 80–100)
MONOCYTES # BLD AUTO: 0.82 K/UL — SIGNIFICANT CHANGE UP (ref 0–0.9)
MONOCYTES NFR BLD AUTO: 11.4 % — SIGNIFICANT CHANGE UP (ref 2–14)
NEUTROPHILS # BLD AUTO: 4.52 K/UL — SIGNIFICANT CHANGE UP (ref 1.8–7.4)
NEUTROPHILS NFR BLD AUTO: 62.8 % — SIGNIFICANT CHANGE UP (ref 43–77)
NRBC # BLD: 0 /100 WBCS — SIGNIFICANT CHANGE UP (ref 0–0)
PLATELET # BLD AUTO: 218 K/UL — SIGNIFICANT CHANGE UP (ref 150–400)
POTASSIUM SERPL-MCNC: 3.4 MMOL/L — LOW (ref 3.5–5.3)
POTASSIUM SERPL-SCNC: 3.4 MMOL/L — LOW (ref 3.5–5.3)
RBC # BLD: 3.6 M/UL — LOW (ref 3.8–5.2)
RBC # FLD: 13.7 % — SIGNIFICANT CHANGE UP (ref 10.3–14.5)
SODIUM SERPL-SCNC: 137 MMOL/L — SIGNIFICANT CHANGE UP (ref 135–145)
WBC # BLD: 7.2 K/UL — SIGNIFICANT CHANGE UP (ref 3.8–10.5)
WBC # FLD AUTO: 7.2 K/UL — SIGNIFICANT CHANGE UP (ref 3.8–10.5)

## 2019-11-21 PROCEDURE — 99233 SBSQ HOSP IP/OBS HIGH 50: CPT | Mod: GC

## 2019-11-21 RX ORDER — ZOLPIDEM TARTRATE 10 MG/1
5 TABLET ORAL ONCE
Refills: 0 | Status: DISCONTINUED | OUTPATIENT
Start: 2019-11-21 | End: 2019-11-21

## 2019-11-21 RX ORDER — ZALEPLON 10 MG
5 CAPSULE ORAL AT BEDTIME
Refills: 0 | Status: DISCONTINUED | OUTPATIENT
Start: 2019-11-21 | End: 2019-11-24

## 2019-11-21 RX ORDER — KETOROLAC TROMETHAMINE 30 MG/ML
15 SYRINGE (ML) INJECTION ONCE
Refills: 0 | Status: DISCONTINUED | OUTPATIENT
Start: 2019-11-21 | End: 2019-11-21

## 2019-11-21 RX ADMIN — Medication 137 MICROGRAM(S): at 05:41

## 2019-11-21 RX ADMIN — Medication 650 MILLIGRAM(S): at 11:50

## 2019-11-21 RX ADMIN — Medication 0.25 MILLIGRAM(S): at 18:41

## 2019-11-21 RX ADMIN — Medication 650 MILLIGRAM(S): at 05:41

## 2019-11-21 RX ADMIN — Medication 650 MILLIGRAM(S): at 11:48

## 2019-11-21 RX ADMIN — Medication 650 MILLIGRAM(S): at 06:41

## 2019-11-21 RX ADMIN — Medication 15 MILLIGRAM(S): at 18:34

## 2019-11-21 RX ADMIN — SODIUM CHLORIDE 100 MILLILITER(S): 9 INJECTION, SOLUTION INTRAVENOUS at 01:12

## 2019-11-21 NOTE — PROGRESS NOTE ADULT - SUBJECTIVE AND OBJECTIVE BOX
Ortho Note    Pt comfortable without complaints, pain controlled  Denies CP, SOB, N/V, numbness/tingling     Vital Signs Last 24 Hrs  T(C): 37.1 (11-21-19 @ 05:03), Max: 37.1 (11-21-19 @ 05:03)  T(F): 98.7 (11-21-19 @ 05:03), Max: 98.7 (11-21-19 @ 05:03)  HR: 75 (11-21-19 @ 08:15) (74 - 75)  BP: 110/68 (11-21-19 @ 05:03) (110/68 - 110/68)  BP(mean): --  RR: 20 (11-21-19 @ 08:15) (18 - 20)  SpO2: 100% (11-21-19 @ 08:15) (98% - 100%)  AVSS    General: Pt Alert and oriented, NAD  L shoulder in sling  Sensation intact C5-T1  AIN/PIN/Uln/Axillary intact  2+ radial pulse  fingers wwp                          11.0   7.20  )-----------( 218      ( 21 Nov 2019 06:51 )             33.2   21 Nov 2019 06:51    137    |  100    |  10     ----------------------------<  109    3.4     |  30     |  0.72     Ca    8.7        21 Nov 2019 06:51    TPro  6.8    /  Alb  3.9    /  TBili  0.5    /  DBili  x      /  AST  20     /  ALT  17     /  AlkPhos  81     20 Nov 2019 13:03      A/P: 69yFemale s/p [facility] fall with L proximal humerus 2 part fracture    - Stable  - Pain Control  - DVT ppx: SCDs  - PT, WBS: NWB LUE in sling  - dispo OR today     Ortho Pager [phone]

## 2019-11-21 NOTE — PROGRESS NOTE ADULT - SUBJECTIVE AND OBJECTIVE BOX
Interval Events: Reviewed  Patient seen and examined at bedside.    Patient is a 69y old  Female who presents with a chief complaint of right humeral head/neck fracture (2019 10:02)  pain in the left arm    PAST MEDICAL & SURGICAL HISTORY:  Mitral valve prolapse  H/O: rheumatic fever  Herniated disc, cervical  History of palpitations  ETHAN on CPAP  Migraines  Cataract  Gastroesophageal reflux disease  Hypothyroid  Hypertension  Cataract extraction status of eye, left  H/O knee surgery: torn minsicus repair b/l knee  H/O:       MEDICATIONS:  Pulmonary:    Antimicrobials:    Anticoagulants:    Cardiac:      Allergies    codeine (Vomiting; Nausea)  cortisone (Other; Rash; Pruritus)  latex (Pruritus; Rash)  lidocaine topical (Rash)  penicillin (Red Man Synd; Rash; Pruritus)  predniSONE (Other; Rash)    Intolerances        Vital Signs Last 24 Hrs  T(C): 36.8 (2019 20:36), Max: 37.1 (2019 05:03)  T(F): 98.2 (2019 20:36), Max: 98.7 (2019 05:03)  HR: 87 (2019 20:36) (74 - 87)  BP: 121/69 (2019 20:36) (110/67 - 121/69)  BP(mean): --  RR: 16 (2019 20:36) (16 - 20)  SpO2: 100% (2019 20:36) (97% - 100%)     @ 07:01  -   @ 07:00  --------------------------------------------------------  IN: 1420 mL / OUT: 900 mL / NET: 520 mL          Review of Systems:   •	General: negative  •	Skin/Breast: negative  •	Ophthalmologic: negative  •	ENMT: negative  •	Respiratory and Thorax: negative  •	Cardiovascular: negative  •	Gastrointestinal: negative  •	Genitourinary: negative  •	Musculoskeletal: negative  •	Neurological: negative  •	Psychiatric: negative  •	Hematology/Lymphatics: negative  •	Endocrine: negative  •	Allergic/Immunologic: negative    Physical Exam:   • Constitutional:	Well-developed, well nourished  • Eyes:	EOMI; PERRL; no drainage or redness  • ENMT:	No oral lesions; no gross abnormalities  • Neck	No bruits; no thyromegaly or nodules  • Breasts:	not examined  • Back:	No deformity or limitation of movement  • Respiratory:	Breath Sounds equal & clear to percussion & auscultation, no accessory muscle use  • Cardiovascular:	Regular rate & rhythm, normal S1, S2; no murmurs, gallops or rubs; no S3, S4  • Gastrointestinal:	Soft, non-tender, no hepatosplenomegaly, normal bowel sounds  • Genitourinary:	not examined  • Rectal: not examined  • Extremities:	No cyanosis, clubbing or edema  • Vascular:	Equal and normal pulses (carotid, femoral, dorsalis pedis)  • Neurologica:l	not examined  • Skin:	No lesions; no rash  • Lymph Nodes:	No lymphadedenopathy  • Musculoskeletal:	No joint pain, swelling or deformity; no limitation of movement        LABS:      CBC Full  -  ( 2019 06:51 )  WBC Count : 7.20 K/uL  RBC Count : 3.60 M/uL  Hemoglobin : 11.0 g/dL  Hematocrit : 33.2 %  Platelet Count - Automated : 218 K/uL  Mean Cell Volume : 92.2 fl  Mean Cell Hemoglobin : 30.6 pg  Mean Cell Hemoglobin Concentration : 33.1 gm/dL  Auto Neutrophil # : 4.52 K/uL  Auto Lymphocyte # : 1.73 K/uL  Auto Monocyte # : 0.82 K/uL  Auto Eosinophil # : 0.08 K/uL  Auto Basophil # : 0.03 K/uL  Auto Neutrophil % : 62.8 %  Auto Lymphocyte % : 24.0 %  Auto Monocyte % : 11.4 %  Auto Eosinophil % : 1.1 %  Auto Basophil % : 0.4 %        137  |  100  |  10  ----------------------------<  109<H>  3.4<L>   |  30  |  0.72    Ca    8.7      2019 06:51    TPro  6.8  /  Alb  3.9  /  TBili  0.5  /  DBili  x   /  AST  20  /  ALT  17  /  AlkPhos  81  11-20    PT/INR - ( 2019 13:03 )   PT: 12.4 sec;   INR: 1.09          PTT - ( 2019 13:03 )  PTT:29.2 sec      Urinalysis Basic - ( 2019 13:47 )    Color: Yellow / Appearance: Clear / SG: <=1.005 / pH: x  Gluc: x / Ketone: NEGATIVE  / Bili: Negative / Urobili: 0.2 E.U./dL   Blood: x / Protein: NEGATIVE mg/dL / Nitrite: NEGATIVE   Leuk Esterase: Trace / RBC: < 5 /HPF / WBC < 5 /HPF   Sq Epi: x / Non Sq Epi: 0-5 /HPF / Bacteria: Present /HPF      < from: Xray Chest 1 View-PORTABLE IMMEDIATE (19 @ 15:45) >  EXAM:  XR CHEST PORTABLE IMMED 1V            PROCEDURE DATE:  2019            INTERPRETATION:  Clinical History / Reason for exam: Trauma    Comparison : Chest radiograph L AP chest dated 2018.    Technique/Positioning: Portable AP chest x-ray.    Findings:    Support devices: None.    Cardiac/mediastinum/hilum: There are low lung volumes therefore the size   cannot be fully evaluated. There is prominence of the upper lobe   pulmonary vascular markings, perihilar haziness and perihilar areas of   consolidation, and increased density at the lung bases. Findings are   consistent with pulmonary edema. This should be correlated clinically.    Lung parenchyma/Pleura: Pleural effusions cannot be excluded.    Skeleton/soft tissues: Unremarkable.    Impression:      There is prominence of the upper lobe pulmonary vascular markings,   perihilar haziness and perihilar areas of consolidation, and increased   density at the lung bases. Findings are consistent with pulmonary   edema.Thisshould be correlated clinically    < end of copied text >              RADIOLOGY & ADDITIONAL STUDIES (The following images were personally reviewed):  Siegel:                                     No  < from: 12 Lead ECG (19 @ 08:16) >    Ventricular Rate 71 BPM    Atrial Rate 71 BPM    P-R Interval 180 ms    QRS Duration 84 ms    Q-T Interval 414 ms    QTC Calculation(Bezet) 449 ms    P Axis 60 degrees    R Axis 78 degrees    T Axis 30 degrees    Diagnosis Line Normal sinus rhythm  Nonspecific ST and T wave abnormality  Abnormal ECG    < end of copied text >  Urine output:                       adequate  DVT prophylaxis:                 Yes  Flattus:                                  Yes  Bowel movement:              No

## 2019-11-22 DIAGNOSIS — R20.0 ANESTHESIA OF SKIN: ICD-10-CM

## 2019-11-22 DIAGNOSIS — E87.6 HYPOKALEMIA: ICD-10-CM

## 2019-11-22 DIAGNOSIS — S06.0X9A CONCUSSION WITH LOSS OF CONSCIOUSNESS OF UNSPECIFIED DURATION, INITIAL ENCOUNTER: ICD-10-CM
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
## 2019-11-22 DIAGNOSIS — F41.9 ANXIETY DISORDER, UNSPECIFIED: ICD-10-CM

## 2019-11-22 DIAGNOSIS — R55 SYNCOPE AND COLLAPSE: ICD-10-CM

## 2019-11-22 DIAGNOSIS — G43.909 MIGRAINE, UNSPECIFIED, NOT INTRACTABLE, WITHOUT STATUS MIGRAINOSUS: ICD-10-CM

## 2019-11-22 DIAGNOSIS — Z99.89 DEPENDENCE ON OTHER ENABLING MACHINES AND DEVICES: ICD-10-CM
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
## 2019-11-22 DIAGNOSIS — T40.4X5A ADVERSE EFFECT OF OTHER SYNTHETIC NARCOTICS, INITIAL ENCOUNTER: ICD-10-CM

## 2019-11-22 DIAGNOSIS — K21.9 GASTRO-ESOPHAGEAL REFLUX DISEASE WITHOUT ESOPHAGITIS: ICD-10-CM
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
## 2019-11-22 DIAGNOSIS — R11.2 NAUSEA WITH VOMITING, UNSPECIFIED: ICD-10-CM

## 2019-11-22 DIAGNOSIS — I10 ESSENTIAL (PRIMARY) HYPERTENSION: ICD-10-CM

## 2019-11-22 DIAGNOSIS — G47.33 OBSTRUCTIVE SLEEP APNEA (ADULT) (PEDIATRIC): ICD-10-CM

## 2019-11-22 DIAGNOSIS — S42.201A UNSPECIFIED FRACTURE OF UPPER END OF RIGHT HUMERUS, INITIAL ENCOUNTER FOR CLOSED FRACTURE: ICD-10-CM

## 2019-11-22 DIAGNOSIS — E03.9 HYPOTHYROIDISM, UNSPECIFIED: ICD-10-CM

## 2019-11-22 PROCEDURE — 99232 SBSQ HOSP IP/OBS MODERATE 35: CPT | Mod: GC

## 2019-11-22 PROCEDURE — 73020 X-RAY EXAM OF SHOULDER: CPT | Mod: 26,RT

## 2019-11-22 RX ORDER — CEFAZOLIN SODIUM 1 G
2000 VIAL (EA) INJECTION EVERY 8 HOURS
Refills: 0 | Status: COMPLETED | OUTPATIENT
Start: 2019-11-23 | End: 2019-11-23

## 2019-11-22 RX ORDER — MORPHINE SULFATE 50 MG/1
2 CAPSULE, EXTENDED RELEASE ORAL
Refills: 0 | Status: DISCONTINUED | OUTPATIENT
Start: 2019-11-22 | End: 2019-11-24

## 2019-11-22 RX ORDER — ASPIRIN/CALCIUM CARB/MAGNESIUM 324 MG
325 TABLET ORAL
Refills: 0 | Status: DISCONTINUED | OUTPATIENT
Start: 2019-11-22 | End: 2019-11-22

## 2019-11-22 RX ORDER — SENNA PLUS 8.6 MG/1
2 TABLET ORAL AT BEDTIME
Refills: 0 | Status: DISCONTINUED | OUTPATIENT
Start: 2019-11-22 | End: 2019-11-24

## 2019-11-22 RX ORDER — TRAMADOL HYDROCHLORIDE 50 MG/1
50 TABLET ORAL EVERY 4 HOURS
Refills: 0 | Status: DISCONTINUED | OUTPATIENT
Start: 2019-11-22 | End: 2019-11-24

## 2019-11-22 RX ORDER — MORPHINE SULFATE 50 MG/1
2 CAPSULE, EXTENDED RELEASE ORAL EVERY 4 HOURS
Refills: 0 | Status: DISCONTINUED | OUTPATIENT
Start: 2019-11-22 | End: 2019-11-24

## 2019-11-22 RX ORDER — POLYETHYLENE GLYCOL 3350 17 G/17G
17 POWDER, FOR SOLUTION ORAL DAILY
Refills: 0 | Status: DISCONTINUED | OUTPATIENT
Start: 2019-11-22 | End: 2019-11-24

## 2019-11-22 RX ORDER — BENZOCAINE AND MENTHOL 5; 1 G/100ML; G/100ML
1 LIQUID ORAL
Refills: 0 | Status: DISCONTINUED | OUTPATIENT
Start: 2019-11-22 | End: 2019-11-24

## 2019-11-22 RX ORDER — TRAMADOL HYDROCHLORIDE 50 MG/1
100 TABLET ORAL EVERY 6 HOURS
Refills: 0 | Status: DISCONTINUED | OUTPATIENT
Start: 2019-11-22 | End: 2019-11-24

## 2019-11-22 RX ORDER — FLUTICASONE PROPIONATE 50 MCG
1 SPRAY, SUSPENSION NASAL
Refills: 0 | Status: DISCONTINUED | OUTPATIENT
Start: 2019-11-22 | End: 2019-11-24

## 2019-11-22 RX ORDER — LANOLIN ALCOHOL/MO/W.PET/CERES
5 CREAM (GRAM) TOPICAL AT BEDTIME
Refills: 0 | Status: DISCONTINUED | OUTPATIENT
Start: 2019-11-22 | End: 2019-11-24

## 2019-11-22 RX ORDER — ACETAMINOPHEN 500 MG
975 TABLET ORAL EVERY 8 HOURS
Refills: 0 | Status: DISCONTINUED | OUTPATIENT
Start: 2019-11-22 | End: 2019-11-24

## 2019-11-22 RX ORDER — PROCHLORPERAZINE MALEATE 5 MG
5 TABLET ORAL ONCE
Refills: 0 | Status: DISCONTINUED | OUTPATIENT
Start: 2019-11-22 | End: 2019-11-24

## 2019-11-22 RX ORDER — SODIUM CHLORIDE 9 MG/ML
1000 INJECTION, SOLUTION INTRAVENOUS
Refills: 0 | Status: DISCONTINUED | OUTPATIENT
Start: 2019-11-22 | End: 2019-11-24

## 2019-11-22 RX ORDER — MAGNESIUM HYDROXIDE 400 MG/1
30 TABLET, CHEWABLE ORAL DAILY
Refills: 0 | Status: DISCONTINUED | OUTPATIENT
Start: 2019-11-22 | End: 2019-11-24

## 2019-11-22 RX ADMIN — Medication 650 MILLIGRAM(S): at 05:27

## 2019-11-22 RX ADMIN — Medication 5 MILLIGRAM(S): at 00:28

## 2019-11-22 RX ADMIN — Medication 975 MILLIGRAM(S): at 23:36

## 2019-11-22 RX ADMIN — ONDANSETRON 4 MILLIGRAM(S): 8 TABLET, FILM COATED ORAL at 21:30

## 2019-11-22 RX ADMIN — PANTOPRAZOLE SODIUM 40 MILLIGRAM(S): 20 TABLET, DELAYED RELEASE ORAL at 06:04

## 2019-11-22 RX ADMIN — Medication 5 MILLIGRAM(S): at 23:30

## 2019-11-22 RX ADMIN — SODIUM CHLORIDE 100 MILLILITER(S): 9 INJECTION, SOLUTION INTRAVENOUS at 08:52

## 2019-11-22 RX ADMIN — Medication 650 MILLIGRAM(S): at 14:33

## 2019-11-22 RX ADMIN — Medication 1 SPRAY(S): at 13:33

## 2019-11-22 RX ADMIN — Medication 0.25 MILLIGRAM(S): at 23:30

## 2019-11-22 RX ADMIN — Medication 650 MILLIGRAM(S): at 06:20

## 2019-11-22 RX ADMIN — Medication 137 MICROGRAM(S): at 06:03

## 2019-11-22 RX ADMIN — Medication 975 MILLIGRAM(S): at 23:30

## 2019-11-22 RX ADMIN — Medication 650 MILLIGRAM(S): at 13:33

## 2019-11-22 NOTE — PROGRESS NOTE ADULT - SUBJECTIVE AND OBJECTIVE BOX
Ortho Post Op Check    Procedure: R humerus ORIF  Surgeon: Dr. Collado    Pt comfortable without complaints, pain controlled  Denies CP, SOB, N/V, numbness/tingling       AVSS    General: Pt Alert and oriented, NAD  aquacell DSG C/D/I  Pulses: 2+ radial pulse, digits wwp, cap refill < 2 secs  Sensation: SILT over m/u/r nerves   Motor: + hand . firing biceps and triceps                          11.0   7.20  )-----------( 218      ( 21 Nov 2019 06:51 )             33.2   21 Nov 2019 06:51    137    |  100    |  10     ----------------------------<  109    3.4     |  30     |  0.72           Post-op X-Ray:  hardware in place    A/P: 69yFemale POD#0 s/p above procedure  - Stable  - Pain Control  - DVT ppx: Asa  - Post op abx: Ancef  - PT, WBS: NWANNEMARIE MARTINEZ in sling.     Ortho Pager [phone]

## 2019-11-22 NOTE — PROGRESS NOTE ADULT - ATTENDING COMMENTS
Patient seen and examined with house-staff during bedside rounds.  Resident note read, including vitals, physical findings, laboratory data, and radiological reports.   Revisions included below.  Direct personal management at bed side and extensive interpretation of the data.  Plan was outlined and discussed in details with the housestaff.  Decision making of high complexity  Action taken for acute disease activity to reflect the level of care provided:  - medication reconciliation  - review laboratory data  continue monitor bed and discussed with

## 2019-11-22 NOTE — PROGRESS NOTE ADULT - I HAVE PERSONALLY SEEN, EXAMINED, AND PARTICIPATED IN THE CARE OF THIS PATIENT.  I HAVE REVIEWED ALL PERTINENT CLINICAL INFORMATION, INCLUDING HISTORY, PHYSICAL EXAM, PLAN AND THE MEDICAL/PA/NP STUDENT’S NOTE AND AGREE EXCEPT AS NOTED.
in immunosuppressed patient. Pt c/o intermittent fevers w/ fatigue, and decreased PO intake at home. Pt with ?recent UTI s/p outpt Abx.   - Check UA, UCx.  - Obtain blood Cx.   - Flu swab. CXR.
Statement Selected
Statement Selected

## 2019-11-22 NOTE — PROGRESS NOTE ADULT - SUBJECTIVE AND OBJECTIVE BOX
Interval Events: Reviewed  Patient seen and examined at bedside.    Patient is a 69y old  Female who presents with a chief complaint of right humeral head/neck fracture (2019 07:12)  she still in pain and waiting for surgery    PAST MEDICAL & SURGICAL HISTORY:  Mitral valve prolapse  H/O: rheumatic fever  Herniated disc, cervical  History of palpitations  ETHAN on CPAP  Migraines  Cataract  Gastroesophageal reflux disease  Hypothyroid  Hypertension  Cataract extraction status of eye, left  H/O knee surgery: torn minsicus repair b/l knee  H/O:       MEDICATIONS:  Pulmonary:    Antimicrobials:    Anticoagulants:    Cardiac:      Allergies    codeine (Vomiting; Nausea)  cortisone (Other; Rash; Pruritus)  latex (Pruritus; Rash)  lidocaine topical (Rash)  penicillin (Red Man Synd; Rash; Pruritus)  predniSONE (Other; Rash)    Intolerances        Vital Signs Last 24 Hrs  T(C): 36.6 (2019 16:57), Max: 36.9 (2019 05:22)  T(F): 97.9 (2019 16:57), Max: 98.4 (2019 05:22)  HR: 81 (2019 16:57) (71 - 87)  BP: 123/60 (2019 16:57) (117/64 - 129/60)  BP(mean): --  RR: 16 (2019 16:57) (16 - 18)  SpO2: 96% (2019 16:57) (96% - 100%)     @ 07:01  -   @ 18:13  --------------------------------------------------------  IN: 1100 mL / OUT: 400 mL / NET: 700 mL          Review of Systems:   •	General: negative  •	Skin/Breast: negative  •	Ophthalmologic: negative  •	ENMT: negative  •	Respiratory and Thorax: negative  •	Cardiovascular: negative  •	Gastrointestinal: negative  •	Genitourinary: negative  •	Musculoskeletal: negative  •	Neurological: negative  •	Psychiatric: negative  •	Hematology/Lymphatics: negative  •	Endocrine: negative  •	Allergic/Immunologic: negative    Physical Exam:   • Constitutional:	Well-developed, well nourished  • Eyes:	EOMI; PERRL; no drainage or redness  • ENMT:	No oral lesions; no gross abnormalities  • Neck	No bruits; no thyromegaly or nodules  • Breasts:	not examined  • Back:	No deformity or limitation of movement  • Respiratory:	Breath Sounds equal & clear to percussion & auscultation, no accessory muscle use  • Cardiovascular:	Regular rate & rhythm, normal S1, S2; no murmurs, gallops or rubs; no S3, S4  • Gastrointestinal:	Soft, non-tender, no hepatosplenomegaly, normal bowel sounds  • Genitourinary:	not examined  • Rectal: not examined  • Extremities:	No cyanosis, clubbing or edema  • Vascular:	Equal and normal pulses (carotid, femoral, dorsalis pedis)  • Neurologica:l	not examined  • Skin:	No lesions; no rash  • Lymph Nodes:	No lymphadedenopathy  • Musculoskeletal:	No joint pain, swelling or deformity; no limitation of movement        LABS:      CBC Full  -  ( 2019 06:51 )  WBC Count : 7.20 K/uL  RBC Count : 3.60 M/uL  Hemoglobin : 11.0 g/dL  Hematocrit : 33.2 %  Platelet Count - Automated : 218 K/uL  Mean Cell Volume : 92.2 fl  Mean Cell Hemoglobin : 30.6 pg  Mean Cell Hemoglobin Concentration : 33.1 gm/dL  Auto Neutrophil # : 4.52 K/uL  Auto Lymphocyte # : 1.73 K/uL  Auto Monocyte # : 0.82 K/uL  Auto Eosinophil # : 0.08 K/uL  Auto Basophil # : 0.03 K/uL  Auto Neutrophil % : 62.8 %  Auto Lymphocyte % : 24.0 %  Auto Monocyte % : 11.4 %  Auto Eosinophil % : 1.1 %  Auto Basophil % : 0.4 %        137  |  100  |  10  ----------------------------<  109<H>  3.4<L>   |  30  |  0.72    Ca    8.7      2019 06:51                          RADIOLOGY & ADDITIONAL STUDIES (The following images were personally reviewed):  Siegel:                                     No  Urine output:                       adequate  DVT prophylaxis:                 Yes  Flattus:                                  Yes  Bowel movement:              No

## 2019-11-22 NOTE — PROGRESS NOTE ADULT - SUBJECTIVE AND OBJECTIVE BOX
Ortho Note    Pt comfortable without complaints, reports some pain but improves with pain meds  Denies CP, SOB, N/V, numbness/tingling     Vital Signs Last 24 Hrs  T(C): 36.9 (11-22-19 @ 05:22), Max: 36.9 (11-22-19 @ 05:22)  T(F): 98.4 (11-22-19 @ 05:22), Max: 98.4 (11-22-19 @ 05:22)  HR: 73 (11-22-19 @ 06:20) (71 - 75)  BP: 117/64 (11-22-19 @ 05:22) (117/64 - 117/64)  BP(mean): --  RR: 18 (11-22-19 @ 06:20) (16 - 18)  SpO2: 99% (11-22-19 @ 06:20) (98% - 100%)  AVSS    General: Pt Alert and oriented, NAD  L shoulder in sling  Sensation intact C5-T1  AIN/PIN/Uln/Axillary intact  2+ radial pulse  fingers wwp                          11.0   7.20  )-----------( 218      ( 21 Nov 2019 06:51 )             33.2   21 Nov 2019 06:51    137    |  100    |  10     ----------------------------<  109    3.4     |  30     |  0.72       TPro  6.8    /  Alb  3.9    /  TBili  0.5    /  DBili  x      /  AST  20     /  ALT  17     /  AlkPhos  81     20 Nov 2019 13:03      A/P: 69F s/p mechanical fall with L proximal humerus 2 part fracture    - Stable  - Pain Control  - DVT ppx: SCDs  - PT, WBS: NWB LUE in sling  - dispo OR    Ortho Pager [phone]

## 2019-11-23 DIAGNOSIS — Z98.890 OTHER SPECIFIED POSTPROCEDURAL STATES: ICD-10-CM

## 2019-11-23 LAB
ANION GAP SERPL CALC-SCNC: 13 MMOL/L — SIGNIFICANT CHANGE UP (ref 5–17)
BUN SERPL-MCNC: 10 MG/DL — SIGNIFICANT CHANGE UP (ref 7–23)
CALCIUM SERPL-MCNC: 7.6 MG/DL — LOW (ref 8.4–10.5)
CHLORIDE SERPL-SCNC: 101 MMOL/L — SIGNIFICANT CHANGE UP (ref 96–108)
CO2 SERPL-SCNC: 21 MMOL/L — LOW (ref 22–31)
CREAT SERPL-MCNC: 0.57 MG/DL — SIGNIFICANT CHANGE UP (ref 0.5–1.3)
GLUCOSE SERPL-MCNC: 99 MG/DL — SIGNIFICANT CHANGE UP (ref 70–99)
HCT VFR BLD CALC: 23.5 % — LOW (ref 34.5–45)
HGB BLD-MCNC: 7.4 G/DL — LOW (ref 11.5–15.5)
MCHC RBC-ENTMCNC: 30 PG — SIGNIFICANT CHANGE UP (ref 27–34)
MCHC RBC-ENTMCNC: 31.5 GM/DL — LOW (ref 32–36)
MCV RBC AUTO: 95.1 FL — SIGNIFICANT CHANGE UP (ref 80–100)
NRBC # BLD: 0 /100 WBCS — SIGNIFICANT CHANGE UP (ref 0–0)
PLATELET # BLD AUTO: 166 K/UL — SIGNIFICANT CHANGE UP (ref 150–400)
POTASSIUM SERPL-MCNC: 4.2 MMOL/L — SIGNIFICANT CHANGE UP (ref 3.5–5.3)
POTASSIUM SERPL-SCNC: 4.2 MMOL/L — SIGNIFICANT CHANGE UP (ref 3.5–5.3)
RBC # BLD: 2.47 M/UL — LOW (ref 3.8–5.2)
RBC # FLD: 13.6 % — SIGNIFICANT CHANGE UP (ref 10.3–14.5)
SODIUM SERPL-SCNC: 135 MMOL/L — SIGNIFICANT CHANGE UP (ref 135–145)
WBC # BLD: 9.96 K/UL — SIGNIFICANT CHANGE UP (ref 3.8–10.5)
WBC # FLD AUTO: 9.96 K/UL — SIGNIFICANT CHANGE UP (ref 3.8–10.5)

## 2019-11-23 PROCEDURE — 99232 SBSQ HOSP IP/OBS MODERATE 35: CPT

## 2019-11-23 RX ORDER — ALPRAZOLAM 0.25 MG
0.5 TABLET ORAL
Refills: 0 | Status: DISCONTINUED | OUTPATIENT
Start: 2019-11-23 | End: 2019-11-24

## 2019-11-23 RX ADMIN — Medication 137 MICROGRAM(S): at 06:15

## 2019-11-23 RX ADMIN — Medication 975 MILLIGRAM(S): at 06:16

## 2019-11-23 RX ADMIN — POLYETHYLENE GLYCOL 3350 17 GRAM(S): 17 POWDER, FOR SOLUTION ORAL at 12:17

## 2019-11-23 RX ADMIN — Medication 975 MILLIGRAM(S): at 14:40

## 2019-11-23 RX ADMIN — TRAMADOL HYDROCHLORIDE 50 MILLIGRAM(S): 50 TABLET ORAL at 18:47

## 2019-11-23 RX ADMIN — Medication 975 MILLIGRAM(S): at 22:05

## 2019-11-23 RX ADMIN — PANTOPRAZOLE SODIUM 40 MILLIGRAM(S): 20 TABLET, DELAYED RELEASE ORAL at 06:16

## 2019-11-23 RX ADMIN — Medication 975 MILLIGRAM(S): at 23:06

## 2019-11-23 RX ADMIN — Medication 1 TABLET(S): at 22:06

## 2019-11-23 RX ADMIN — Medication 2000 MILLIGRAM(S): at 01:46

## 2019-11-23 RX ADMIN — Medication 2000 MILLIGRAM(S): at 10:03

## 2019-11-23 RX ADMIN — TRAMADOL HYDROCHLORIDE 50 MILLIGRAM(S): 50 TABLET ORAL at 19:45

## 2019-11-23 RX ADMIN — Medication 1 TABLET(S): at 06:16

## 2019-11-23 RX ADMIN — Medication 0.5 MILLIGRAM(S): at 23:40

## 2019-11-23 RX ADMIN — Medication 5 MILLIGRAM(S): at 22:06

## 2019-11-23 RX ADMIN — Medication 975 MILLIGRAM(S): at 14:01

## 2019-11-23 RX ADMIN — TRAMADOL HYDROCHLORIDE 50 MILLIGRAM(S): 50 TABLET ORAL at 14:38

## 2019-11-23 RX ADMIN — TRAMADOL HYDROCHLORIDE 50 MILLIGRAM(S): 50 TABLET ORAL at 15:30

## 2019-11-23 NOTE — PROGRESS NOTE ADULT - SUBJECTIVE AND OBJECTIVE BOX
Interval Events: Reviewed  Patient seen and examined at bedside.    Patient is a 69y old  Female who presents with a chief complaint of right humeral head/neck fracture (2019 10:16)  1 day s/p ORIF right proximal humerus fracture on 2019. Pt states she is doing well with pain at 4-5/10, pain controlled. positive flatus  and urination. No chest pain, no shortness of breath.     PAST MEDICAL & SURGICAL HISTORY:  Mitral valve prolapse  H/O: rheumatic fever  Herniated disc, cervical  History of palpitations  ETHAN on CPAP  Migraines  Cataract  Gastroesophageal reflux disease  Hypothyroid  Hypertension  Cataract extraction status of eye, left  H/O knee surgery: torn minsicus repair b/l knee  H/O:       MEDICATIONS:  Pulmonary:    Antimicrobials:    Anticoagulants:    Cardiac:      Allergies    codeine (Vomiting; Nausea)  cortisone (Other; Rash; Pruritus)  latex (Pruritus; Rash)  lidocaine topical (Rash)  penicillin (Red Man Synd; Rash; Pruritus)  predniSONE (Other; Rash)    Intolerances        Vital Signs Last 24 Hrs  T(C): 36.2 (2019 10:14), Max: 36.9 (2019 06:09)  T(F): 97.1 (2019 10:14), Max: 98.5 (2019 06:09)  HR: 77 (2019 10:14) (69 - 88)  BP: 108/56 (2019 10:14) (108/56 - 151/72)  BP(mean): 88 (2019 22:00) (83 - 90)  RR: 16 (2019 10:14) (16 - 20)  SpO2: 99% (2019 10:14) (96% - 100%)     @ : @ 07:00  --------------------------------------------------------  IN: 2000 mL / OUT: 1700 mL / NET: 300 mL     @ 07: @ 12:50  --------------------------------------------------------  IN: 550 mL / OUT: 0 mL / NET: 550 mL          Review of Systems:   •	General: negative  •	Skin/Breast: negative  •	Ophthalmologic: negative  •	ENMT: negative  •	Respiratory and Thorax: negative  •	Cardiovascular: negative  •	Gastrointestinal: negative  •	Genitourinary: negative  •	Musculoskeletal: negative  •	Neurological: negative  •	Psychiatric: negative  •	Hematology/Lymphatics: negative  •	Endocrine: negative  •	Allergic/Immunologic: negative    Physical Exam:   • Constitutional:	Well-developed, well nourished  • Eyes:	EOMI; PERRL; no drainage or redness  • ENMT:	No oral lesions; no gross abnormalities  • Neck	No bruits; no thyromegaly or nodules  • Breasts:	not examined  • Back:	No deformity or limitation of movement  • Respiratory:	Breath Sounds equal & clear to auscultation, no accessory muscle use  • Cardiovascular:	Regular rate & rhythm, normal S1, S2; no murmurs, gallops or rubs; no S3, S4  • Gastrointestinal:	Soft, non-tender, no hepatosplenomegaly, normal bowel sounds  • Genitourinary:	not examined  • Rectal: not examined  • Extremities:	No cyanosis, clubbing or edema, dressing intact on right shoulder, nvi of fingers.  • Vascular:	Equal and normal pulses (dorsalis pedis)  • Neurologica:l	not examined  • Skin:	No lesions; no rash  • Lymph Nodes:	No lymphadedenopathy  • Musculoskeletal:	No joint pain, swelling or deformity; no limitation of movement        LABS:      CBC Full  -  ( 2019 07:58 )  WBC Count : 9.96 K/uL  RBC Count : 2.47 M/uL  Hemoglobin : 7.4 g/dL  Hematocrit : 23.5 %  Platelet Count - Automated : 166 K/uL  Mean Cell Volume : 95.1 fl  Mean Cell Hemoglobin : 30.0 pg  Mean Cell Hemoglobin Concentration : 31.5 gm/dL  Auto Neutrophil # : x  Auto Lymphocyte # : x  Auto Monocyte # : x  Auto Eosinophil # : x  Auto Basophil # : x  Auto Neutrophil % : x  Auto Lymphocyte % : x  Auto Monocyte % : x  Auto Eosinophil % : x  Auto Basophil % : x    11-23    135  |  101  |  10  ----------------------------<  99  4.2   |  21<L>  |  0.57    Ca    7.6<L>      2019 07:58                          RADIOLOGY & ADDITIONAL STUDIES (The following images were personally reviewed):  Siegel:                                     No  Urine output:                       adequate  DVT prophylaxis:                 Yes  Flattus:                                  Yes  Bowel movement:              No

## 2019-11-23 NOTE — PROGRESS NOTE ADULT - SUBJECTIVE AND OBJECTIVE BOX
S: No events overnight    O:   Vital Signs Last 24 Hrs  T(C): 36.2 (23 Nov 2019 10:14), Max: 36.9 (23 Nov 2019 06:09)  T(F): 97.1 (23 Nov 2019 10:14), Max: 98.5 (23 Nov 2019 06:09)  HR: 77 (23 Nov 2019 10:14) (69 - 88)  BP: 108/56 (23 Nov 2019 10:14) (108/56 - 151/72)  BP(mean): 88 (22 Nov 2019 22:00) (83 - 90)  RR: 16 (23 Nov 2019 10:14) (16 - 20)  SpO2: 99% (23 Nov 2019 10:14) (96% - 100%)    11-22 @ 07:01  -  11-23 @ 07:00  --------------------------------------------------------  IN:    lactated ringers.: 1100 mL    lactated ringers.: 900 mL  Total IN: 2000 mL    OUT:    Voided: 1700 mL  Total OUT: 1700 mL    Total NET: 300 mL    PE:  RUE  DSG CDI  SILT M/U/R  Motor AIN/PIN/U Intact  Radial 2+  Ext WWP, Brisk Cap Refill                            7.4    9.96  )-----------( 166      ( 23 Nov 2019 07:58 )             23.5     11-23    135  |  101  |  10  ----------------------------<  99  4.2   |  21<L>  |  0.57    Ca    7.6<L>      23 Nov 2019 07:58

## 2019-11-23 NOTE — OCCUPATIONAL THERAPY INITIAL EVALUATION ADULT - GENERAL OBSERVATIONS, REHAB EVAL
Right hand dominant. Patient cleared for Occupational Therapy by SHAQ Franklin, patient received supine in vicente position in non-acute distress. +Tele, +IV heplock, + right proximal humerus dressing C/D/I with +sling, + bilateral sequential compression devices.

## 2019-11-23 NOTE — OCCUPATIONAL THERAPY INITIAL EVALUATION ADULT - LEVEL OF INDEPENDENCE: DRESS UPPER BODY, OT EVAL
patient reports children will be available to assist at home (children present in room for education/demo)/minimum assist (75% patients effort)

## 2019-11-23 NOTE — OCCUPATIONAL THERAPY INITIAL EVALUATION ADULT - PERTINENT HX OF CURRENT PROBLEM, REHAB EVAL
69F presents with right shoulder pain x 2 days. The patient states that she tripped and fell on 11/18/19 and fell on her right side into the door. The patient went to [facility] ER after her fall and was admitted but decided to come to [facility] today for definitive treatment by Dr. Collado. The patient complains mainly of right shoulder pain and difficulty lifting her arm. She has been wearing a sling since the fall. S/P R humerus ORIF 11/22.

## 2019-11-23 NOTE — OCCUPATIONAL THERAPY INITIAL EVALUATION ADULT - RANGE OF MOTION EXAMINATION, UPPER EXTREMITY
Left UE Active ROM was WNL (within normal limits)/right upper extremity NWB-NT except wrist/digits AROM WFL

## 2019-11-24 ENCOUNTER — TRANSCRIPTION ENCOUNTER (OUTPATIENT)
Age: 69
End: 2019-11-24

## 2019-11-24 VITALS
TEMPERATURE: 98 F | OXYGEN SATURATION: 94 % | DIASTOLIC BLOOD PRESSURE: 67 MMHG | HEART RATE: 84 BPM | RESPIRATION RATE: 17 BRPM | SYSTOLIC BLOOD PRESSURE: 136 MMHG

## 2019-11-24 LAB
ANION GAP SERPL CALC-SCNC: 8 MMOL/L — SIGNIFICANT CHANGE UP (ref 5–17)
BUN SERPL-MCNC: 15 MG/DL — SIGNIFICANT CHANGE UP (ref 7–23)
CALCIUM SERPL-MCNC: 8.7 MG/DL — SIGNIFICANT CHANGE UP (ref 8.4–10.5)
CHLORIDE SERPL-SCNC: 99 MMOL/L — SIGNIFICANT CHANGE UP (ref 96–108)
CO2 SERPL-SCNC: 29 MMOL/L — SIGNIFICANT CHANGE UP (ref 22–31)
CREAT SERPL-MCNC: 0.75 MG/DL — SIGNIFICANT CHANGE UP (ref 0.5–1.3)
GLUCOSE SERPL-MCNC: 114 MG/DL — HIGH (ref 70–99)
HCT VFR BLD CALC: 25 % — LOW (ref 34.5–45)
HGB BLD-MCNC: 8.4 G/DL — LOW (ref 11.5–15.5)
MCHC RBC-ENTMCNC: 30.9 PG — SIGNIFICANT CHANGE UP (ref 27–34)
MCHC RBC-ENTMCNC: 33.6 GM/DL — SIGNIFICANT CHANGE UP (ref 32–36)
MCV RBC AUTO: 91.9 FL — SIGNIFICANT CHANGE UP (ref 80–100)
NRBC # BLD: 0 /100 WBCS — SIGNIFICANT CHANGE UP (ref 0–0)
PLATELET # BLD AUTO: 215 K/UL — SIGNIFICANT CHANGE UP (ref 150–400)
POTASSIUM SERPL-MCNC: 4.2 MMOL/L — SIGNIFICANT CHANGE UP (ref 3.5–5.3)
POTASSIUM SERPL-SCNC: 4.2 MMOL/L — SIGNIFICANT CHANGE UP (ref 3.5–5.3)
RBC # BLD: 2.72 M/UL — LOW (ref 3.8–5.2)
RBC # FLD: 14.2 % — SIGNIFICANT CHANGE UP (ref 10.3–14.5)
SODIUM SERPL-SCNC: 136 MMOL/L — SIGNIFICANT CHANGE UP (ref 135–145)
WBC # BLD: 9.17 K/UL — SIGNIFICANT CHANGE UP (ref 3.8–10.5)
WBC # FLD AUTO: 9.17 K/UL — SIGNIFICANT CHANGE UP (ref 3.8–10.5)

## 2019-11-24 PROCEDURE — 99232 SBSQ HOSP IP/OBS MODERATE 35: CPT

## 2019-11-24 RX ORDER — TRAMADOL HYDROCHLORIDE 50 MG/1
1 TABLET ORAL
Qty: 30 | Refills: 0
Start: 2019-11-24 | End: 2019-11-28

## 2019-11-24 RX ADMIN — TRAMADOL HYDROCHLORIDE 50 MILLIGRAM(S): 50 TABLET ORAL at 13:30

## 2019-11-24 RX ADMIN — Medication 975 MILLIGRAM(S): at 14:45

## 2019-11-24 RX ADMIN — Medication 137 MICROGRAM(S): at 06:21

## 2019-11-24 RX ADMIN — POLYETHYLENE GLYCOL 3350 17 GRAM(S): 17 POWDER, FOR SOLUTION ORAL at 12:32

## 2019-11-24 RX ADMIN — PANTOPRAZOLE SODIUM 40 MILLIGRAM(S): 20 TABLET, DELAYED RELEASE ORAL at 06:21

## 2019-11-24 RX ADMIN — Medication 975 MILLIGRAM(S): at 06:21

## 2019-11-24 RX ADMIN — TRAMADOL HYDROCHLORIDE 50 MILLIGRAM(S): 50 TABLET ORAL at 12:33

## 2019-11-24 RX ADMIN — Medication 1 TABLET(S): at 06:21

## 2019-11-24 RX ADMIN — Medication 975 MILLIGRAM(S): at 15:40

## 2019-11-24 RX ADMIN — Medication 975 MILLIGRAM(S): at 07:21

## 2019-11-24 NOTE — PROGRESS NOTE ADULT - PROBLEM SELECTOR PROBLEM 4
PVC (premature ventricular contraction)

## 2019-11-24 NOTE — PROGRESS NOTE ADULT - SUBJECTIVE AND OBJECTIVE BOX
S: No events overnight    O:   Vital Signs Last 24 Hrs  T(C): 36.9 (24 Nov 2019 09:00), Max: 36.9 (24 Nov 2019 09:00)  T(F): 98.5 (24 Nov 2019 09:00), Max: 98.5 (24 Nov 2019 09:00)  HR: 79 (24 Nov 2019 09:00) (69 - 90)  BP: 112/76 (24 Nov 2019 09:00) (101/55 - 122/58)  BP(mean): 79 (24 Nov 2019 06:00) (73 - 79)  ABP: --  ABP(mean): --  RR: 17 (24 Nov 2019 09:00) (16 - 20)  SpO2: 97% (24 Nov 2019 08:45) (94% - 100%)    PE:  RUE  DSG CDI  SILT M/U/R  Motor AIN/PIN/U Intact  Radial 2+  Ext WWP, Brisk Cap Refill                          8.4    9.17  )-----------( 215      ( 24 Nov 2019 07:32 )             25.0   11-24    136  |  99  |  15  ----------------------------<  114<H>  4.2   |  29  |  0.75    Ca    8.7      24 Nov 2019 07:32

## 2019-11-24 NOTE — DISCHARGE NOTE PROVIDER - NSDCCPTREATMENT_GEN_ALL_CORE_FT
PRINCIPAL PROCEDURE  Procedure: Open treatment, fracture, humerus, proximal  Findings and Treatment:

## 2019-11-24 NOTE — PROGRESS NOTE ADULT - REASON FOR ADMISSION
right humeral head/neck fracture

## 2019-11-24 NOTE — DISCHARGE NOTE PROVIDER - NSDCFUADDAPPT_GEN_ALL_CORE_FT
No strenuous activity, heavy lifting, driving, tub bathing, or returning to work until cleared by MD.  Non-Weightbearing in Sling at all times. Keep banadage clean dry and intact. If shower is desired needs limb support, may remove sling in shower and support arm. dressings stay on until follow up.     Follow up with Dr. Collado. call to schedule an appt within 7-14 days.  If you don't have a bowel movement by post op day 3, then take Milk of Magnesia (over the counter).  If no bowel movement by at least post op day 5, then use a Dulcolax suppository (over the counter) and/or a Fleets enema--if still no bowel movement, call your MD.  Contact your doctor if you experience: fever greater than 101.5, chills, chest pain, difficulty breathing, bleeding, redness or heat around the incision.

## 2019-11-24 NOTE — PROGRESS NOTE ADULT - PROBLEM SELECTOR PLAN 1
she is complaint with the cpap and to avoid oversedation.

## 2019-11-24 NOTE — PROGRESS NOTE ADULT - PROBLEM SELECTOR PLAN 4
patient has extensive workup including holter monitor and all were negative.  EKG was normal.  Move to a monitor bed
patient has extensive workup including holter monitor and all were negative.  EKG was normal.  Move to a monitor bed.  Replace K

## 2019-11-24 NOTE — DISCHARGE NOTE PROVIDER - NSDCMRMEDTOKEN_GEN_ALL_CORE_FT
butalbital: 2 times a day, As Needed  Emergen-C oral powder for reconstitution:   hydroCHLOROthiazide 12.5 mg oral tablet: 1 tab(s) orally once a day  meclizine 25 mg oral tablet: 1 tab(s) orally 3 times a day   NexIUM:   Synthroid:   traMADol 50 mg oral tablet: 1 tab(s) orally every 4 hours MDD:6  Vitamin D3: 2 tab(s) orally once a day  Xanax 0.25 mg oral tablet: 1 tab(s) orally 3 times a day

## 2019-11-24 NOTE — PROGRESS NOTE ADULT - SUBJECTIVE AND OBJECTIVE BOX
ortho attg note    Pain controlled. Walked around yesterday.    PE:  R UE:  dressing c/d/i  NVID    Postop XR: reduction and hardware in good position    A/P s/p ORIF R proximal humerus fracture    -OOB  -Sling  -Pain control  -D/c home today

## 2019-11-24 NOTE — PROGRESS NOTE ADULT - SUBJECTIVE AND OBJECTIVE BOX
Interval Events: Reviewed  Patient seen and examined at bedside.    Patient is a 69y old  Female who presents with a chief complaint of right humeral head/neck fracture (2019 08:07).         PAST MEDICAL & SURGICAL HISTORY:  Mitral valve prolapse  H/O: rheumatic fever  Herniated disc, cervical  History of palpitations  ETHAN on CPAP  Migraines  Cataract  Gastroesophageal reflux disease  Hypothyroid  Hypertension  Cataract extraction status of eye, left  H/O knee surgery: torn minsicus repair b/l knee  H/O:       MEDICATIONS:  Pulmonary:    Antimicrobials:    Anticoagulants:    Cardiac:      Allergies    codeine (Vomiting; Nausea)  cortisone (Other; Rash; Pruritus)  latex (Pruritus; Rash)  lidocaine topical (Rash)  penicillin (Red [place] Synd; Rash; Pruritus)  predniSONE (Other; Rash)    Intolerances        Vital Signs Last 24 Hrs  T(C): 36.9 (2019 09:00), Max: 36.9 (2019 09:00)  T(F): 98.5 (2019 09:00), Max: 98.5 (2019 09:00)  HR: 79 (2019 09:00) (69 - 90)  BP: 112/76 (2019 09:00) (101/55 - 122/58)  BP(mean): 79 (2019 06:00) (73 - 79)  RR: 17 (2019 09:00) (16 - 20)  SpO2: 97% (2019 08:45) (94% - 100%)     @ 07:01  -   @ 07:00  --------------------------------------------------------  IN: 1600 mL / OUT: 0 mL / NET: 1600 mL          Review of Systems:   •	General: negative  •	Skin/Breast: negative  •	Ophthalmologic: negative  •	ENMT: negative  •	Respiratory and Thorax: negative  •	Cardiovascular: negative  •	Gastrointestinal: negative  •	Genitourinary: negative  •	Musculoskeletal: negative  •	Neurological: negative  •	Psychiatric: negative  •	Hematology/Lymphatics: negative  •	Endocrine: negative  •	Allergic/Immunologic: negative    Physical Exam:   • Constitutional:	Well-developed, well nourished  • Eyes:	EOMI; PERRL; no drainage or redness  • ENMT:	No oral lesions; no gross abnormalities  • Neck	No bruits; no thyromegaly or nodules  • Breasts:	not examined  • Back:	No deformity or limitation of movement  • Respiratory:	Breath Sounds equal & clear to percussion & auscultation, no accessory muscle use  • Cardiovascular:	Regular rate & rhythm, normal S1, S2; no murmurs, gallops or rubs; no S3, S4  • Gastrointestinal:	Soft, non-tender, no hepatosplenomegaly, normal bowel sounds  • Genitourinary:	not examined  • Rectal: not examined  • Extremities:	No cyanosis, clubbing or edema  • Vascular:	Equal and normal pulses (carotid, femoral, dorsalis pedis)  • Neurologica:l	not examined  • Skin:	No lesions; no rash  • Lymph Nodes:	No lymphadedenopathy  • Musculoskeletal:	No joint pain, swelling or deformity; no limitation of movement        LABS:      CBC Full  -  ( 2019 07:32 )  WBC Count : 9.17 K/uL  RBC Count : 2.72 M/uL  Hemoglobin : 8.4 g/dL  Hematocrit : 25.0 %  Platelet Count - Automated : 215 K/uL  Mean Cell Volume : 91.9 fl  Mean Cell Hemoglobin : 30.9 pg  Mean Cell Hemoglobin Concentration : 33.6 gm/dL  Auto Neutrophil # : x  Auto Lymphocyte # : x  Auto Monocyte # : x  Auto Eosinophil # : x  Auto Basophil # : x  Auto Neutrophil % : x  Auto Lymphocyte % : x  Auto Monocyte % : x  Auto Eosinophil % : x  Auto Basophil % : x    11-24    136  |  99  |  15  ----------------------------<  114<H>  4.2   |  29  |  0.75    Ca    8.7      2019 07:32                          RADIOLOGY & ADDITIONAL STUDIES (The following images were personally reviewed):  Siegel:                                     No  Urine output:                       adequate  DVT prophylaxis:                 Yes  Flattus:                                  Yes  Bowel movement:              No Interval Events: Reviewed  Patient seen and examined at bedside.    Patient is a 69y old  Female who presents with a chief complaint of right humeral head/neck fracture (2019 08:07).   2 days s/p ORIF right humeral fracture on 2019. Pt admits to 7/10 pain scale, controlled with pain medications. Positive BM, no chest pain ,  no shortness of breath      PAST MEDICAL & SURGICAL HISTORY:  Mitral valve prolapse  H/O: rheumatic fever  Herniated disc, cervical  History of palpitations  ETHAN on CPAP  Migraines  Cataract  Gastroesophageal reflux disease  Hypothyroid  Hypertension  Cataract extraction status of eye, left  H/O knee surgery: torn minsicus repair b/l knee  H/O:       MEDICATIONS:  Pulmonary:    Antimicrobials:    Anticoagulants:    Cardiac:      Allergies    codeine (Vomiting; Nausea)  cortisone (Other; Rash; Pruritus)  latex (Pruritus; Rash)  lidocaine topical (Rash)  penicillin (Red Man Synd; Rash; Pruritus)  predniSONE (Other; Rash)    Intolerances        Vital Signs Last 24 Hrs  T(C): 36.9 (2019 09:00), Max: 36.9 (2019 09:00)  T(F): 98.5 (2019 09:00), Max: 98.5 (2019 09:00)  HR: 79 (2019 09:00) (69 - 90)  BP: 112/76 (2019 09:00) (101/55 - 122/58)  BP(mean): 79 (2019 06:00) (73 - 79)  RR: 17 (2019 09:00) (16 - 20)  SpO2: 97% (2019 08:45) (94% - 100%)     @ 07:01  -   @ 07:00  --------------------------------------------------------  IN: 1600 mL / OUT: 0 mL / NET: 1600 mL          Review of Systems:   •	General: negative  •	Skin/Breast: negative  •	Ophthalmologic: negative  •	ENMT: negative  •	Respiratory and Thorax: negative  •	Cardiovascular: negative  •	Gastrointestinal: negative  •	Genitourinary: negative  •	Musculoskeletal: negative  •	Neurological: negative  •	Psychiatric: negative  •	Hematology/Lymphatics: negative  •	Endocrine: negative  •	Allergic/Immunologic: negative    Physical Exam:   • Constitutional:	Well-developed, well nourished  • Eyes:	EOMI; PERRL; no drainage or redness  • ENMT:	No oral lesions; no gross abnormalities  • Neck	No bruits; no thyromegaly or nodules  • Breasts:	not examined  • Back:	No deformity or limitation of movement  • Respiratory:	Breath Sounds equal & clear to auscultation, no accessory muscle use  • Cardiovascular:	Regular rate & rhythm, normal S1, S2; no murmurs, gallops or rubs; no S3, S4  • Gastrointestinal:	Soft, non-tender, no hepatosplenomegaly, normal bowel sounds  • Genitourinary:	not examined  • Rectal: not examined  • Extremities:	No cyanosis, clubbing or edema, dressing intact right shoulder, nvi of digits.   • Vascular:	Equal and normal pulses (dorsalis pedis)  • Neurologica:l	not examined  • Skin:	No lesions; no rash  • Lymph Nodes:	No lymphadedenopathy  • Musculoskeletal:	No joint pain, swelling or deformity; no limitation of movement        LABS:      CBC Full  -  ( 2019 07:32 )  WBC Count : 9.17 K/uL  RBC Count : 2.72 M/uL  Hemoglobin : 8.4 g/dL  Hematocrit : 25.0 %  Platelet Count - Automated : 215 K/uL  Mean Cell Volume : 91.9 fl  Mean Cell Hemoglobin : 30.9 pg  Mean Cell Hemoglobin Concentration : 33.6 gm/dL  Auto Neutrophil # : x  Auto Lymphocyte # : x  Auto Monocyte # : x  Auto Eosinophil # : x  Auto Basophil # : x  Auto Neutrophil % : x  Auto Lymphocyte % : x  Auto Monocyte % : x  Auto Eosinophil % : x  Auto Basophil % : x    11-24    136  |  99  |  15  ----------------------------<  114<H>  4.2   |  29  |  0.75    Ca    8.7      2019 07:32                          RADIOLOGY & ADDITIONAL STUDIES (The following images were personally reviewed):  Siegel:                                     No  Urine output:                       adequate  DVT prophylaxis:                 Yes  Flattus:                                  Yes  Bowel movement:              No

## 2019-11-24 NOTE — PROGRESS NOTE ADULT - ASSESSMENT
68 y/o female 1 day s/p ORIF right proximal humerus fracture on 11/22/2019. Pt states she is doing well with pain at 4-5/10, pain controlled. positive flatus  and urination. No chest pain, no shortness of breath.
69y f s/p R prox humerus ORIF 11/22    -PT ryder INFANTE exercises shoulder, ROMAT elbow and wrist  -Trend labs  -Pain control  -DVT PPX: SCD  -Dispo Planning: Home when pain controlled
69y f s/p R prox humerus ORIF 11/22    -PT ryder INFANTE exercises shoulder, ROMAT elbow and wrist  -Trend labs  -Pain control  -DVT PPX: SCD  -Dispo Planning: Home when pain controlled
68 y/o female 2 day s/p ORIF right proximal humerus fracture on 11/22/2019. Pt states she is doing well. Positive BM and urination. No chest pain, no shortness of breath.

## 2019-11-24 NOTE — DISCHARGE NOTE PROVIDER - HOSPITAL COURSE
Admitted for surgery - elective R Proximal humerus Open Reduction and Internal fixation    [facility]inity-op Abx (Ancef)    DVT ppx - SCDs    IVF    PT    Pain Control

## 2019-11-24 NOTE — DISCHARGE NOTE PROVIDER - CARE PROVIDER_API CALL
Grayson Collado)  Orthopaedic Surgery; Sports Medicine  [address], [address] [place] [address]  Phone: [phone]  Fax: [phone]  Follow Up Time:

## 2019-11-24 NOTE — PROGRESS NOTE ADULT - PROBLEM SELECTOR PLAN 5
The patient's medical condition is optimized for surgery.  There is no contraindication for surgery.  There is no clinical evidence neither of angina, decompensated CHF, arrhthymias, nor valvular disease.   There is no limitation of exercise capacity.  MET is 5 .  ASA class is 2.  Lombardo cardiac risk factor is low .  DVT prophylaxis is indicated.  Pain control.  Early mobilization.  Avoid fluid overload.
The patient's medical condition is optimized for surgery.  There is no contraindication for surgery.  There is no clinical evidence neither of angina, decompensated CHF, arrhthymias, nor valvular disease.   There is no limitation of exercise capacity.  MET is 5 .  ASA class is 2.  Lombardo cardiac risk factor is low .  DVT prophylaxis is indicated.  Pain control.  Early mobilization.  Avoid fluid overload.
The patient is hemodynamically stable.  The pain is controlled.  Patient is on DVT prophylaxis.  Patient is using incentive spirometry.  Oxygen saturation is acceptable.  Advance diet as tolerated.  Advance activity as tolerated.  Monitor for ileus.  Patient is on Laxatives.  Surgical wound is stable.  No indication for monitor bed.
The patient is hemodynamically stable.  The pain is controlled.  Patient is on DVT prophylaxis.  Patient is using incentive spirometry.  Oxygen saturation is acceptable.  Advance diet as tolerated.  Advance activity as tolerated.  Monitor for ileus.  Patient is on Laxatives.  Surgical wound is stable.  No indication for monitor bed.

## 2019-11-24 NOTE — DISCHARGE NOTE PROVIDER - NSDCCPCAREPLAN_GEN_ALL_CORE_FT
PRINCIPAL DISCHARGE DIAGNOSIS  Diagnosis: Other closed displaced fracture of proximal end of right humerus, initial encounter  Assessment and Plan of Treatment:

## 2019-11-24 NOTE — DISCHARGE NOTE NURSING/CASE MANAGEMENT/SOCIAL WORK - PATIENT PORTAL LINK FT
You can access the FollowMyHealth Patient Portal offered by [facility] by registering at the following website: [email or web address]. By joining [facility]’s FollowMyHealth portal, you will also be able to view your health information using other applications (apps) compatible with our system.

## 2019-11-25 LAB — VIT D25+D1,25 OH+D1,25 PNL SERPL-MCNC: 57.6 PG/ML — SIGNIFICANT CHANGE UP (ref 19.9–79.3)

## 2019-11-29 ENCOUNTER — EMERGENCY (EMERGENCY)
Facility: HOSPITAL | Age: 69
LOS: 0 days | Discharge: HOME | End: 2019-11-29
Attending: EMERGENCY MEDICINE
Payer: MEDICARE

## 2019-11-29 VITALS
WEIGHT: 199.96 LBS | RESPIRATION RATE: 20 BRPM | SYSTOLIC BLOOD PRESSURE: 146 MMHG | HEART RATE: 125 BPM | OXYGEN SATURATION: 100 % | TEMPERATURE: 99 F | DIASTOLIC BLOOD PRESSURE: 80 MMHG

## 2019-11-29 VITALS — RESPIRATION RATE: 18 BRPM | HEART RATE: 98 BPM | OXYGEN SATURATION: 95 %

## 2019-11-29 DIAGNOSIS — Z02.9 ENCOUNTER FOR ADMINISTRATIVE EXAMINATIONS, UNSPECIFIED: ICD-10-CM
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
## 2019-11-29 DIAGNOSIS — E87.1 HYPO-OSMOLALITY AND HYPONATREMIA: ICD-10-CM

## 2019-11-29 DIAGNOSIS — G47.33 OBSTRUCTIVE SLEEP APNEA (ADULT) (PEDIATRIC): ICD-10-CM

## 2019-11-29 DIAGNOSIS — E03.9 HYPOTHYROIDISM, UNSPECIFIED: ICD-10-CM

## 2019-11-29 DIAGNOSIS — Z88.6 ALLERGY STATUS TO ANALGESIC AGENT: ICD-10-CM

## 2019-11-29 DIAGNOSIS — I34.1 NONRHEUMATIC MITRAL (VALVE) PROLAPSE: ICD-10-CM
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
## 2019-11-29 DIAGNOSIS — I10 ESSENTIAL (PRIMARY) HYPERTENSION: ICD-10-CM

## 2019-11-29 DIAGNOSIS — G43.909 MIGRAINE, UNSPECIFIED, NOT INTRACTABLE, WITHOUT STATUS MIGRAINOSUS: ICD-10-CM

## 2019-11-29 DIAGNOSIS — Z99.89 DEPENDENCE ON OTHER ENABLING MACHINES AND DEVICES: ICD-10-CM

## 2019-11-29 DIAGNOSIS — Z98.891 HISTORY OF UTERINE SCAR FROM PREVIOUS SURGERY: Chronic | ICD-10-CM

## 2019-11-29 DIAGNOSIS — Z98.890 OTHER SPECIFIED POSTPROCEDURAL STATES: Chronic | ICD-10-CM

## 2019-11-29 DIAGNOSIS — K21.9 GASTRO-ESOPHAGEAL REFLUX DISEASE WITHOUT ESOPHAGITIS: ICD-10-CM

## 2019-11-29 DIAGNOSIS — M79.603 PAIN IN ARM, UNSPECIFIED: ICD-10-CM

## 2019-11-29 DIAGNOSIS — Z91.040 LATEX ALLERGY STATUS: ICD-10-CM

## 2019-11-29 DIAGNOSIS — S42.201A UNSPECIFIED FRACTURE OF UPPER END OF RIGHT HUMERUS, INITIAL ENCOUNTER FOR CLOSED FRACTURE: ICD-10-CM

## 2019-11-29 DIAGNOSIS — H26.9 UNSPECIFIED CATARACT: ICD-10-CM

## 2019-11-29 DIAGNOSIS — Z98.42 CATARACT EXTRACTION STATUS, LEFT EYE: Chronic | ICD-10-CM

## 2019-11-29 DIAGNOSIS — D64.9 ANEMIA, UNSPECIFIED: ICD-10-CM

## 2019-11-29 PROCEDURE — 99284 EMERGENCY DEPT VISIT MOD MDM: CPT

## 2019-11-29 RX ORDER — SUCRALFATE 1 G
1 TABLET ORAL ONCE
Refills: 0 | Status: COMPLETED | OUTPATIENT
Start: 2019-11-29 | End: 2019-11-29

## 2019-11-29 RX ORDER — IBUPROFEN 200 MG
400 TABLET ORAL ONCE
Refills: 0 | Status: COMPLETED | OUTPATIENT
Start: 2019-11-29 | End: 2019-11-29

## 2019-11-29 RX ADMIN — Medication 1 MILLIGRAM(S): at 19:37

## 2019-11-29 NOTE — ED PROVIDER NOTE - PATIENT PORTAL LINK FT
You can access the FollowMyHealth Patient Portal offered by [facility] by registering at the following website: [email or web address]. By joining [email or web address]’s FollowMyHealth portal, you will also be able to view your health information using other applications (apps) compatible with our system.

## 2019-11-29 NOTE — ED PROVIDER NOTE - PHYSICAL EXAMINATION
Physical Exam    Vital Signs: I have reviewed the initial vital signs.  Constitutional: well-nourished, appears stated age, no acute distress  Eyes: Conjunctiva pink, Sclera clear  Cardiovascular: S1 and S2, regular rate, regular rhythm, well-perfused extremities, radial pulses equal and 2+, pedal pulses 2+ and equal.   Respiratory: unlabored respiratory effort, clear to auscultation bilaterally no wheezing, rales and rhonchi  Gastrointestinal: soft, non-tender abdomen, no pulsatile mass, normal bowl sounds  Musculoskeletal: supple neck, no lower extremity edema, no midline tenderness, surgical site with tape in place.   Integumentary: warm, dry, no rash  Neurologic: awake, alert, nvi.

## 2019-11-29 NOTE — ED PROVIDER NOTE - ATTENDING CONTRIBUTION TO CARE
Pt here for palpitations for 2 weeks.  No CP/SOB/syncope.    xam: RRR, CTAB, no LE edema, no calf tenderness, R shoulder without signs of infection  Plan: SDM with family - anxiolytic, dc home

## 2019-11-29 NOTE — ED ADULT TRIAGE NOTE - CHIEF COMPLAINT QUOTE
Pt brought in by ambulance from home. "I had right humeral fracture with surgery one week ago. Since then I have had a high heart rate."

## 2019-11-29 NOTE — ED ADULT NURSE NOTE - PMH
Cataract    Gastroesophageal reflux disease    H/O: rheumatic fever    Herniated disc, cervical    History of palpitations    Hypertension    Hypothyroid    Migraines    Mitral valve prolapse    ETHAN on CPAP

## 2019-11-29 NOTE — ED ADULT NURSE NOTE - NSIMPLEMENTINTERV_GEN_ALL_ED
Implemented All Fall Risk Interventions:  [place] to call system. Call bell, personal items and telephone within reach. Instruct patient to call for assistance. Room bathroom lighting operational. Non-slip footwear when patient is off stretcher. Physically safe environment: no spills, clutter or unnecessary equipment. Stretcher in lowest position, wheels locked, appropriate side rails in place. Provide visual cue, wrist band, yellow gown, etc. Monitor gait and stability. Monitor for mental status changes and reorient to person, place, and time. Review medications for side effects contributing to fall risk. Reinforce activity limits and safety measures with patient and family.

## 2019-11-29 NOTE — ED PROVIDER NOTE - CARE PROVIDER_API CALL
Enrico Goel)  Cardiovascular Disease; Internal Medicine  [address] [place] [id number]  Phone: [phone]  Fax: [phone]  Established Patient  Follow Up Time: 1-3 Days

## 2019-11-29 NOTE — ED ADULT NURSE NOTE - CHIEF COMPLAINT QUOTE
Pt brought in by ambulance from home. "I had right humeral fracture with surgery one week ago. Since then I have had a high heart rate." FREE:[LAST:[minkus],PHONE:[(   )    -],FAX:[(   )    -]]

## 2019-11-29 NOTE — ED PROVIDER NOTE - OBJECTIVE STATEMENT
68 yo female, pmh of htn, hypothyroidism, pvcs followed by cards without intervention, presents to ed for palpitation. Pt states had right humerus surgery one week ago s/p fracture. Was advised by orthopedic Dr. Collado to come to ed if HR was >100. Pt only taking tylenol for pain control; states becomes nauseas with other forms of analgesia. At this time pt states denies fever, chills, cp, sob, le swelling, abd pain, nvd, numbness, tingling dizziness, rash.

## 2019-11-29 NOTE — ED PROVIDER NOTE - NS ED ROS FT
Constitutional: (-) fever, (-) chills  Eyes: (-) visual changes  ENT: (-) nasal congestions  Cardiovascular: (-) chest pain, (-) syncope, (+) palpitation  Respiratory: (-) cough, (-) shortness of breath, (-) dyspnea,   Gastrointestinal: (-) vomiting, (-) diarrhea, (-)nausea,  Musculoskeletal: (-) neck pain, (-) back pain, (-) joint pain,  Integumentary: (-) rash, (-) edema, (-) bruises  Neurological: (-) headache, (-) loc, (-) dizziness, (-) tingling, (-)numbness  Peripheral Vascular: (-) leg swelling  :  (-)dysuria,  (-) hematuria  Allergic/Immunologic: (-) pruritus

## 2019-12-01 ENCOUNTER — INPATIENT (INPATIENT)
Facility: HOSPITAL | Age: 69
LOS: 1 days | Discharge: HOME | End: 2019-12-03
Attending: INTERNAL MEDICINE | Admitting: INTERNAL MEDICINE
Payer: MEDICARE

## 2019-12-01 VITALS
DIASTOLIC BLOOD PRESSURE: 65 MMHG | RESPIRATION RATE: 20 BRPM | HEART RATE: 92 BPM | OXYGEN SATURATION: 100 % | TEMPERATURE: 99 F | WEIGHT: 199.96 LBS | SYSTOLIC BLOOD PRESSURE: 138 MMHG

## 2019-12-01 DIAGNOSIS — I26.99 OTHER PULMONARY EMBOLISM WITHOUT ACUTE COR PULMONALE: ICD-10-CM

## 2019-12-01 DIAGNOSIS — I21.A9 OTHER MYOCARDIAL INFARCTION TYPE: ICD-10-CM

## 2019-12-01 DIAGNOSIS — Z98.890 OTHER SPECIFIED POSTPROCEDURAL STATES: Chronic | ICD-10-CM

## 2019-12-01 DIAGNOSIS — Z98.42 CATARACT EXTRACTION STATUS, LEFT EYE: Chronic | ICD-10-CM

## 2019-12-01 DIAGNOSIS — Z98.891 HISTORY OF UTERINE SCAR FROM PREVIOUS SURGERY: Chronic | ICD-10-CM

## 2019-12-01 DIAGNOSIS — R09.89 OTHER SPECIFIED SYMPTOMS AND SIGNS INVOLVING THE CIRCULATORY AND RESPIRATORY SYSTEMS: ICD-10-CM

## 2019-12-01 LAB
ALBUMIN SERPL ELPH-MCNC: 3.9 G/DL — SIGNIFICANT CHANGE UP (ref 3.5–5.2)
ALP SERPL-CCNC: 93 U/L — SIGNIFICANT CHANGE UP (ref 30–115)
ALT FLD-CCNC: 20 U/L — SIGNIFICANT CHANGE UP (ref 0–41)
ANION GAP SERPL CALC-SCNC: 12 MMOL/L — SIGNIFICANT CHANGE UP (ref 7–14)
APTT BLD: 31 SEC — SIGNIFICANT CHANGE UP (ref 27–39.2)
AST SERPL-CCNC: 19 U/L — SIGNIFICANT CHANGE UP (ref 0–41)
BASOPHILS # BLD AUTO: 0.03 K/UL — SIGNIFICANT CHANGE UP (ref 0–0.2)
BASOPHILS NFR BLD AUTO: 0.3 % — SIGNIFICANT CHANGE UP (ref 0–1)
BILIRUB SERPL-MCNC: 0.4 MG/DL — SIGNIFICANT CHANGE UP (ref 0.2–1.2)
BUN SERPL-MCNC: 26 MG/DL — HIGH (ref 10–20)
CALCIUM SERPL-MCNC: 9.5 MG/DL — SIGNIFICANT CHANGE UP (ref 8.5–10.1)
CHLORIDE SERPL-SCNC: 95 MMOL/L — LOW (ref 98–110)
CO2 SERPL-SCNC: 29 MMOL/L — SIGNIFICANT CHANGE UP (ref 17–32)
CREAT SERPL-MCNC: 0.7 MG/DL — SIGNIFICANT CHANGE UP (ref 0.7–1.5)
D DIMER BLD IA.RAPID-MCNC: 2509 NG/ML DDU — HIGH (ref 0–230)
EOSINOPHIL # BLD AUTO: 0.16 K/UL — SIGNIFICANT CHANGE UP (ref 0–0.7)
EOSINOPHIL NFR BLD AUTO: 1.6 % — SIGNIFICANT CHANGE UP (ref 0–8)
GLUCOSE SERPL-MCNC: 121 MG/DL — HIGH (ref 70–99)
HCT VFR BLD CALC: 30.5 % — LOW (ref 37–47)
HGB BLD-MCNC: 10 G/DL — LOW (ref 12–16)
IMM GRANULOCYTES NFR BLD AUTO: 0.5 % — HIGH (ref 0.1–0.3)
INR BLD: 1.06 RATIO — SIGNIFICANT CHANGE UP (ref 0.65–1.3)
LIDOCAIN IGE QN: 45 U/L — SIGNIFICANT CHANGE UP (ref 7–60)
LYMPHOCYTES # BLD AUTO: 1.75 K/UL — SIGNIFICANT CHANGE UP (ref 1.2–3.4)
LYMPHOCYTES # BLD AUTO: 17.2 % — LOW (ref 20.5–51.1)
MAGNESIUM SERPL-MCNC: 2.1 MG/DL — SIGNIFICANT CHANGE UP (ref 1.8–2.4)
MCHC RBC-ENTMCNC: 30.2 PG — SIGNIFICANT CHANGE UP (ref 27–31)
MCHC RBC-ENTMCNC: 32.8 G/DL — SIGNIFICANT CHANGE UP (ref 32–37)
MCV RBC AUTO: 92.1 FL — SIGNIFICANT CHANGE UP (ref 81–99)
MONOCYTES # BLD AUTO: 0.94 K/UL — HIGH (ref 0.1–0.6)
MONOCYTES NFR BLD AUTO: 9.3 % — SIGNIFICANT CHANGE UP (ref 1.7–9.3)
NEUTROPHILS # BLD AUTO: 7.23 K/UL — HIGH (ref 1.4–6.5)
NEUTROPHILS NFR BLD AUTO: 71.1 % — SIGNIFICANT CHANGE UP (ref 42.2–75.2)
NRBC # BLD: 0 /100 WBCS — SIGNIFICANT CHANGE UP (ref 0–0)
NT-PROBNP SERPL-SCNC: 63 PG/ML — SIGNIFICANT CHANGE UP (ref 0–300)
PLATELET # BLD AUTO: 426 K/UL — HIGH (ref 130–400)
POTASSIUM SERPL-MCNC: 3.7 MMOL/L — SIGNIFICANT CHANGE UP (ref 3.5–5)
POTASSIUM SERPL-SCNC: 3.7 MMOL/L — SIGNIFICANT CHANGE UP (ref 3.5–5)
PROT SERPL-MCNC: 6.9 G/DL — SIGNIFICANT CHANGE UP (ref 6–8)
PROTHROM AB SERPL-ACNC: 12.2 SEC — SIGNIFICANT CHANGE UP (ref 9.95–12.87)
RBC # BLD: 3.31 M/UL — LOW (ref 4.2–5.4)
RBC # FLD: 14.3 % — SIGNIFICANT CHANGE UP (ref 11.5–14.5)
SODIUM SERPL-SCNC: 136 MMOL/L — SIGNIFICANT CHANGE UP (ref 135–146)
TROPONIN T SERPL-MCNC: <0.01 NG/ML — SIGNIFICANT CHANGE UP
WBC # BLD: 10.16 K/UL — SIGNIFICANT CHANGE UP (ref 4.8–10.8)
WBC # FLD AUTO: 10.16 K/UL — SIGNIFICANT CHANGE UP (ref 4.8–10.8)

## 2019-12-01 PROCEDURE — 99291 CRITICAL CARE FIRST HOUR: CPT

## 2019-12-01 PROCEDURE — 99222 1ST HOSP IP/OBS MODERATE 55: CPT

## 2019-12-01 PROCEDURE — 71045 X-RAY EXAM CHEST 1 VIEW: CPT | Mod: 26

## 2019-12-01 PROCEDURE — 71046 X-RAY EXAM CHEST 2 VIEWS: CPT | Mod: 26

## 2019-12-01 PROCEDURE — 71275 CT ANGIOGRAPHY CHEST: CPT | Mod: 26

## 2019-12-01 RX ORDER — ALPRAZOLAM 0.25 MG
0.25 TABLET ORAL THREE TIMES A DAY
Refills: 0 | Status: DISCONTINUED | OUTPATIENT
Start: 2019-12-01 | End: 2019-12-03

## 2019-12-01 RX ORDER — DIPHENHYDRAMINE HCL 50 MG
50 CAPSULE ORAL ONCE
Refills: 0 | Status: COMPLETED | OUTPATIENT
Start: 2019-12-01 | End: 2019-12-01

## 2019-12-01 RX ORDER — CHOLECALCIFEROL (VITAMIN D3) 125 MCG
1000 CAPSULE ORAL DAILY
Refills: 0 | Status: DISCONTINUED | OUTPATIENT
Start: 2019-12-02 | End: 2019-12-03

## 2019-12-01 RX ORDER — LEVOTHYROXINE SODIUM 125 MCG
137 TABLET ORAL DAILY
Refills: 0 | Status: DISCONTINUED | OUTPATIENT
Start: 2019-12-01 | End: 2019-12-03

## 2019-12-01 RX ORDER — MECLIZINE HCL 12.5 MG
25 TABLET ORAL DAILY
Refills: 0 | Status: DISCONTINUED | OUTPATIENT
Start: 2019-12-01 | End: 2019-12-03

## 2019-12-01 RX ORDER — PANTOPRAZOLE SODIUM 20 MG/1
40 TABLET, DELAYED RELEASE ORAL
Refills: 0 | Status: DISCONTINUED | OUTPATIENT
Start: 2019-12-01 | End: 2019-12-03

## 2019-12-01 RX ORDER — ENOXAPARIN SODIUM 100 MG/ML
90 INJECTION SUBCUTANEOUS ONCE
Refills: 0 | Status: COMPLETED | OUTPATIENT
Start: 2019-12-01 | End: 2019-12-01

## 2019-12-01 RX ORDER — LEVOTHYROXINE SODIUM 125 MCG
0 TABLET ORAL
Qty: 0 | Refills: 0 | DISCHARGE

## 2019-12-01 RX ORDER — HYDROCHLOROTHIAZIDE 25 MG
12.5 TABLET ORAL DAILY
Refills: 0 | Status: DISCONTINUED | OUTPATIENT
Start: 2019-12-02 | End: 2019-12-03

## 2019-12-01 RX ORDER — SODIUM CHLORIDE 9 MG/ML
1000 INJECTION INTRAMUSCULAR; INTRAVENOUS; SUBCUTANEOUS
Refills: 0 | Status: DISCONTINUED | OUTPATIENT
Start: 2019-12-01 | End: 2019-12-02

## 2019-12-01 RX ORDER — ASPIRIN/CALCIUM CARB/MAGNESIUM 324 MG
324 TABLET ORAL ONCE
Refills: 0 | Status: COMPLETED | OUTPATIENT
Start: 2019-12-01 | End: 2019-12-01

## 2019-12-01 RX ORDER — TRAMADOL HYDROCHLORIDE 50 MG/1
50 TABLET ORAL EVERY 4 HOURS
Refills: 0 | Status: DISCONTINUED | OUTPATIENT
Start: 2019-12-01 | End: 2019-12-03

## 2019-12-01 RX ORDER — FAMOTIDINE 10 MG/ML
20 INJECTION INTRAVENOUS ONCE
Refills: 0 | Status: COMPLETED | OUTPATIENT
Start: 2019-12-01 | End: 2019-12-01

## 2019-12-01 RX ORDER — CHLORHEXIDINE GLUCONATE 213 G/1000ML
1 SOLUTION TOPICAL
Refills: 0 | Status: DISCONTINUED | OUTPATIENT
Start: 2019-12-01 | End: 2019-12-03

## 2019-12-01 RX ADMIN — FAMOTIDINE 100 MILLIGRAM(S): 10 INJECTION INTRAVENOUS at 20:19

## 2019-12-01 RX ADMIN — ENOXAPARIN SODIUM 90 MILLIGRAM(S): 100 INJECTION SUBCUTANEOUS at 22:51

## 2019-12-01 RX ADMIN — Medication 324 MILLIGRAM(S): at 20:00

## 2019-12-01 RX ADMIN — Medication 50 MILLIGRAM(S): at 20:54

## 2019-12-01 NOTE — H&P ADULT - NSHPLABSRESULTS_GEN_ALL_CORE
10.0   10.16 )-----------( 426      ( 01 Dec 2019 19:32 )             30.5       12-01    136  |  95<L>  |  26<H>  ----------------------------<  121<H>  3.7   |  29  |  0.7    Ca    9.5      01 Dec 2019 19:32  Mg     2.1     12-01    TPro  6.9  /  Alb  3.9  /  TBili  0.4  /  DBili  x   /  AST  19  /  ALT  20  /  AlkPhos  93  12-01                  PT/INR - ( 01 Dec 2019 19:32 )   PT: 12.20 sec;   INR: 1.06 ratio         PTT - ( 01 Dec 2019 19:32 )  PTT:31.0 sec    Lactate Trend      CARDIAC MARKERS ( 01 Dec 2019 19:32 )  x     / <0.01 ng/mL / x     / x     / x            CAPILLARY BLOOD GLUCOSE

## 2019-12-01 NOTE — ED PROVIDER NOTE - NS ED ROS FT
Review of Systems    Constitutional: (-) fever   Eyes/ENT: (-) vision changes  Cardiovascular: (+) chest pain, (-) syncope (-) palpitations  Respiratory: (-) cough, (-) shortness of breath  Gastrointestinal: (-) vomiting, (-) diarrhea  (-) abdominal pain  Genitourinary:  (-) dysuria   Musculoskeletal: (-) neck pain, (-) back pain, (-) leg pain/swelling  Integumentary: (-) rash, (-) edema  Neurological: (-) headache  Hematologic: (-) easy bruising   Allergic/Immunologic: (-) pruritus

## 2019-12-01 NOTE — ED PROVIDER NOTE - PHYSICAL EXAMINATION
PHYSICAL EXAM:    GENERAL: Alert, appears stated age, well appearing, non-toxic  SKIN: Warm, pink and dry. MMM.   EYE: Normal lids/conjunctiva  ENT: Normal hearing, patent oropharynx  NECK: +supple. No meningismus, or JVD  Pulm: Bilateral BS, normal resp effort, no wheezes, stridor, or retractions  CV: RRR, no M/R/G, 2+and = radial pulses +L lateral chest wall TTP including into L axilla. no rash.   Abd: soft, non-tender, non-distended  Mskel: no erythema, cyanosis, edema. no calf tenderness  Neuro: AAOx3, normal gait.

## 2019-12-01 NOTE — H&P ADULT - NSHPPHYSICALEXAM_GEN_ALL_CORE
GENERAL:  70y/o obese Female NAD, resting comfortably.  HEAD:  Atraumatic, Normocephalic  EYES: EOMI, PERRLA, conjunctiva and sclera clear  NECK: Supple, No JVD, no cervical lymphadenopathy, non-tender  CHEST/LUNG: Clear to auscultation bilaterally; No wheeze, rhonchi, or rales  HEART: Regular rate and rhythm; S1&S2  ABDOMEN: Soft, Nontender, Nondistended x 4 quadrants; Bowel sounds present  EXTREMITIES:   Peripheral Pulses Present, No clubbing, no cyanosis, or no edema, no calf tenderness. rt arm cast   PSYCH: AAOx3, cooperative, appropriate  NEUROLOGY: WNL  SKIN: WNL

## 2019-12-01 NOTE — H&P ADULT - HISTORY OF PRESENT ILLNESS
68 y/o F with PMH ETHAN on CPAP, hypothyroidism, HTN, recent fall with R humerus fx, s/p repair 1 wk ago @ [facility] presents with L intermittent sharp, non-radiating reproducible lateral chest wall pain lasting seconds x 1 hr. no palliating/provoking factors. no associated symptoms. no hx stress test in the past due to refusal. no known hx CAD.   	has seen dr. vitale in past.  Denies hemoptysis, hx cancers, hx PE/DVT, hormone use.

## 2019-12-01 NOTE — ED PROVIDER NOTE - ATTENDING CONTRIBUTION TO CARE
I personally evaluated the patient. I reviewed the Resident’s or Physician Assistant’s note (as assigned above), and agree with the findings and plan except as documented in my note.  Chart reviewed. H/O right humerus surgery, MVP, anxiety, presents with left sided chest pain. Exam shows alert patient in no distress, HEENT NCAT, lungs clear, RR S1S2, abdomen soft NT +BS, swollen right upper arm.

## 2019-12-01 NOTE — H&P ADULT - PROBLEM SELECTOR PLAN 1
discussed w/ Intensivist Josefa  adm to ICU/CCU telemetry  already started lovenox , cont same  doppler legs to r/o dvt  pt's anxious cont bemzo prn  pulm. consult

## 2019-12-01 NOTE — ED PROVIDER NOTE - CLINICAL SUMMARY MEDICAL DECISION MAKING FREE TEXT BOX
Trop negative. D-dimer elevated. EKG no acute changes. CXR negative. CT chest right segmental PE. Given Lovenox. Will admit to tele.

## 2019-12-01 NOTE — ED ADULT NURSE NOTE - NSIMPLEMENTINTERV_GEN_ALL_ED
Implemented All Fall Risk Interventions:  [place] to call system. Call bell, personal items and telephone within reach. Instruct patient to call for assistance. Room bathroom lighting operational. Non-slip footwear when patient is off stretcher. Physically safe environment: no spills, clutter or unnecessary equipment. Stretcher in lowest position, wheels locked, appropriate side rails in place. Provide visual cue, wrist band, yellow gown, etc. Monitor gait and stability. Monitor for mental status changes and reorient to person, place, and time. Review medications for side effects contributing to fall risk. Reinforce activity limits and safety measures with patient and family.

## 2019-12-02 LAB
ALBUMIN SERPL ELPH-MCNC: 3.7 G/DL — SIGNIFICANT CHANGE UP (ref 3.5–5.2)
ALP SERPL-CCNC: 91 U/L — SIGNIFICANT CHANGE UP (ref 30–115)
ALT FLD-CCNC: 17 U/L — SIGNIFICANT CHANGE UP (ref 0–41)
ANION GAP SERPL CALC-SCNC: 13 MMOL/L — SIGNIFICANT CHANGE UP (ref 7–14)
APTT BLD: 41.2 SEC — HIGH (ref 27–39.2)
AST SERPL-CCNC: 15 U/L — SIGNIFICANT CHANGE UP (ref 0–41)
BASOPHILS # BLD AUTO: 0.02 K/UL — SIGNIFICANT CHANGE UP (ref 0–0.2)
BASOPHILS NFR BLD AUTO: 0.2 % — SIGNIFICANT CHANGE UP (ref 0–1)
BILIRUB SERPL-MCNC: 0.5 MG/DL — SIGNIFICANT CHANGE UP (ref 0.2–1.2)
BUN SERPL-MCNC: 21 MG/DL — HIGH (ref 10–20)
CALCIUM SERPL-MCNC: 8.9 MG/DL — SIGNIFICANT CHANGE UP (ref 8.5–10.1)
CHLORIDE SERPL-SCNC: 100 MMOL/L — SIGNIFICANT CHANGE UP (ref 98–110)
CO2 SERPL-SCNC: 28 MMOL/L — SIGNIFICANT CHANGE UP (ref 17–32)
CREAT SERPL-MCNC: 0.7 MG/DL — SIGNIFICANT CHANGE UP (ref 0.7–1.5)
EOSINOPHIL # BLD AUTO: 0.17 K/UL — SIGNIFICANT CHANGE UP (ref 0–0.7)
EOSINOPHIL NFR BLD AUTO: 2 % — SIGNIFICANT CHANGE UP (ref 0–8)
GLUCOSE SERPL-MCNC: 91 MG/DL — SIGNIFICANT CHANGE UP (ref 70–99)
HCT VFR BLD CALC: 29 % — LOW (ref 37–47)
HGB BLD-MCNC: 9.5 G/DL — LOW (ref 12–16)
IMM GRANULOCYTES NFR BLD AUTO: 0.5 % — HIGH (ref 0.1–0.3)
INR BLD: 1.2 RATIO — SIGNIFICANT CHANGE UP (ref 0.65–1.3)
LYMPHOCYTES # BLD AUTO: 2.81 K/UL — SIGNIFICANT CHANGE UP (ref 1.2–3.4)
LYMPHOCYTES # BLD AUTO: 33.2 % — SIGNIFICANT CHANGE UP (ref 20.5–51.1)
MCHC RBC-ENTMCNC: 30.1 PG — SIGNIFICANT CHANGE UP (ref 27–31)
MCHC RBC-ENTMCNC: 32.8 G/DL — SIGNIFICANT CHANGE UP (ref 32–37)
MCV RBC AUTO: 91.8 FL — SIGNIFICANT CHANGE UP (ref 81–99)
MONOCYTES # BLD AUTO: 0.78 K/UL — HIGH (ref 0.1–0.6)
MONOCYTES NFR BLD AUTO: 9.2 % — SIGNIFICANT CHANGE UP (ref 1.7–9.3)
NEUTROPHILS # BLD AUTO: 4.64 K/UL — SIGNIFICANT CHANGE UP (ref 1.4–6.5)
NEUTROPHILS NFR BLD AUTO: 54.9 % — SIGNIFICANT CHANGE UP (ref 42.2–75.2)
NRBC # BLD: 0 /100 WBCS — SIGNIFICANT CHANGE UP (ref 0–0)
PLATELET # BLD AUTO: 401 K/UL — HIGH (ref 130–400)
POTASSIUM SERPL-MCNC: 3.6 MMOL/L — SIGNIFICANT CHANGE UP (ref 3.5–5)
POTASSIUM SERPL-SCNC: 3.6 MMOL/L — SIGNIFICANT CHANGE UP (ref 3.5–5)
PROT SERPL-MCNC: 6.5 G/DL — SIGNIFICANT CHANGE UP (ref 6–8)
PROTHROM AB SERPL-ACNC: 13.8 SEC — HIGH (ref 9.95–12.87)
RBC # BLD: 3.16 M/UL — LOW (ref 4.2–5.4)
RBC # FLD: 14.2 % — SIGNIFICANT CHANGE UP (ref 11.5–14.5)
SODIUM SERPL-SCNC: 141 MMOL/L — SIGNIFICANT CHANGE UP (ref 135–146)
TROPONIN T SERPL-MCNC: <0.01 NG/ML — SIGNIFICANT CHANGE UP
TSH SERPL-MCNC: 0.06 UIU/ML — LOW (ref 0.27–4.2)
WBC # BLD: 8.46 K/UL — SIGNIFICANT CHANGE UP (ref 4.8–10.8)
WBC # FLD AUTO: 8.46 K/UL — SIGNIFICANT CHANGE UP (ref 4.8–10.8)

## 2019-12-02 PROCEDURE — 99233 SBSQ HOSP IP/OBS HIGH 50: CPT

## 2019-12-02 PROCEDURE — 93970 EXTREMITY STUDY: CPT | Mod: 26

## 2019-12-02 RX ORDER — ENOXAPARIN SODIUM 100 MG/ML
90 INJECTION SUBCUTANEOUS
Refills: 0 | Status: DISCONTINUED | OUTPATIENT
Start: 2019-12-02 | End: 2019-12-02

## 2019-12-02 RX ORDER — ACETAMINOPHEN 500 MG
650 TABLET ORAL EVERY 6 HOURS
Refills: 0 | Status: DISCONTINUED | OUTPATIENT
Start: 2019-12-02 | End: 2019-12-03

## 2019-12-02 RX ORDER — CALCIUM GLUCONATE 100 MG/ML
1 VIAL (ML) INTRAVENOUS EVERY 6 HOURS
Refills: 0 | Status: DISCONTINUED | OUTPATIENT
Start: 2019-12-02 | End: 2019-12-02

## 2019-12-02 RX ORDER — APIXABAN 2.5 MG/1
10 TABLET, FILM COATED ORAL EVERY 12 HOURS
Refills: 0 | Status: DISCONTINUED | OUTPATIENT
Start: 2019-12-02 | End: 2019-12-03

## 2019-12-02 RX ORDER — POLYETHYLENE GLYCOL 3350 17 G/17G
17 POWDER, FOR SOLUTION ORAL DAILY
Refills: 0 | Status: DISCONTINUED | OUTPATIENT
Start: 2019-12-02 | End: 2019-12-03

## 2019-12-02 RX ADMIN — Medication 137 MICROGRAM(S): at 06:07

## 2019-12-02 RX ADMIN — Medication 0.25 MILLIGRAM(S): at 22:12

## 2019-12-02 RX ADMIN — SODIUM CHLORIDE 75 MILLILITER(S): 9 INJECTION INTRAMUSCULAR; INTRAVENOUS; SUBCUTANEOUS at 00:49

## 2019-12-02 RX ADMIN — Medication 650 MILLIGRAM(S): at 15:47

## 2019-12-02 RX ADMIN — Medication 12.5 MILLIGRAM(S): at 06:08

## 2019-12-02 RX ADMIN — PANTOPRAZOLE SODIUM 40 MILLIGRAM(S): 20 TABLET, DELAYED RELEASE ORAL at 06:08

## 2019-12-02 RX ADMIN — Medication 650 MILLIGRAM(S): at 16:17

## 2019-12-02 RX ADMIN — Medication 1000 UNIT(S): at 11:49

## 2019-12-02 RX ADMIN — APIXABAN 10 MILLIGRAM(S): 2.5 TABLET, FILM COATED ORAL at 11:49

## 2019-12-02 RX ADMIN — Medication 0.25 MILLIGRAM(S): at 00:58

## 2019-12-02 RX ADMIN — APIXABAN 10 MILLIGRAM(S): 2.5 TABLET, FILM COATED ORAL at 22:11

## 2019-12-02 RX ADMIN — CHLORHEXIDINE GLUCONATE 1 APPLICATION(S): 213 SOLUTION TOPICAL at 06:08

## 2019-12-02 NOTE — CONSULT NOTE ADULT - SUBJECTIVE AND OBJECTIVE BOX
Patient is a 69y old  Female who presents with a chief complaint of pulmonary embolus (02 Dec 2019 07:59)      HPI:  68 y/o F with PMH ETHAN on CPAP, hypothyroidism, HTN, recent fall with R humerus fx, s/p repair 1 wk ago @ [facility] presents with L intermittent sharp, non-radiating reproducible lateral chest wall pain lasting seconds x 1 hr. no palliating/provoking factors. no associated symptoms. no hx stress test in the past due to refusal. no known hx CAD.   	has seen dr. vitale in past.  Denies hemoptysis, hx cancers, hx PE/DVT, hormone use. (01 Dec 2019 23:11)  CT showed wed right segmental PE     PAST MEDICAL & SURGICAL HISTORY:  Mitral valve prolapse  H/O: rheumatic fever  Herniated disc, cervical  History of palpitations  ETHAN on CPAP  Migraines  Cataract  Gastroesophageal reflux disease  Hypothyroid  Hypertension  Cataract extraction status of eye, left  H/O knee surgery: torn minsicus repair b/l knee  H/O:     Allergies    codeine (Vomiting; Nausea)  cortisone (Other; Rash; Pruritus)  latex (Pruritus; Rash)  lidocaine topical (Rash)  penicillin ([place] Synd; Rash; Pruritus)  predniSONE (Other; Rash)    Intolerances      Family history : no cardiovscular family history   Home Medications:  butalbital: 2 times a day, As Needed (17 Aug 2019 19:33)  Emergen-C oral powder for reconstitution:  (17 Aug 2019 19:33)  hydroCHLOROthiazide 12.5 mg oral tablet: 1 tab(s) orally once a day (2019 00:55)  NexIUM:  (17 Aug 2019 19:33)  Synthroid 137 mcg (0.137 mg) oral tablet: 1 tab(s) orally once a day (01 Dec 2019 22:51)  Vitamin D3: 2 tab(s) orally once a day (17 Aug 2019 19:33)  Xanax 0.25 mg oral tablet: 1 tab(s) orally 3 times a day (17 Aug 2019 19:33)    Occupation:  Alochol: Denied  Smoking: Denied  Drug Use: Denied  Marital Status:         ROS: as in HPI; All other systems reviewed are negative    ICU Vital Signs Last 24 Hrs  T(C): 36.2 (02 Dec 2019 07:01), Max: 37.1 (01 Dec 2019 19:18)  T(F): 97.2 (02 Dec 2019 07:01), Max: 98.7 (01 Dec 2019 19:18)  HR: 72 (02 Dec 2019 07:11) (72 - 92)  BP: 121/57 (02 Dec 2019 07:11) (109/55 - 138/65)  BP(mean): 82 (02 Dec 2019 07:11) (82 - 85)  ABP: --  ABP(mean): --  RR: 18 (02 Dec 2019 07:01) (18 - 20)  SpO2: 98% (02 Dec 2019 00:31) (98% - 100%)        Physical Examination:    General: No acute distress.  Alert, oriented, interactive, nonfocal    HEENT: Pupils equal, reactive to light.  Symmetric.    PULM: Clear to auscultation bilaterally, no significant sputum production    CVS: Regular rate and rhythm, no murmurs, rubs, or gallops    ABD: Soft, nondistended, nontender, normoactive bowel sounds, no masses    EXT: No edema, nontender, no clubbing     SKIN: Warm and well perfused, no rashes noted.    Neurology : no motor or sensory deficit     Musculoskeletal : move all extremity     Lymphatic system: no Palpable node               I&O's Detail    01 Dec 2019 07:01  -  02 Dec 2019 07:00  --------------------------------------------------------  IN:    sodium chloride 0.9%.: 450 mL  Total IN: 450 mL    OUT:  Total OUT: 0 mL    Total NET: 450 mL            LABS:                        9.5    8.46  )-----------( 401      ( 02 Dec 2019 05:44 )             29.0     02 Dec 2019 05:44    141    |  100    |  21     ----------------------------<  91     3.6     |  28     |  0.7      Ca    8.9        02 Dec 2019 05:44  Mg     2.1       01 Dec 2019 19:32    TPro  6.5    /  Alb  3.7    /  TBili  0.5    /  DBili  x      /  AST  15     /  ALT  17     /  AlkPhos  91     02 Dec 2019 05:44  Amylase x     lipase x          CARDIAC MARKERS ( 02 Dec 2019 05:44 )  x     / <0.01 ng/mL / x     / x     / x      CARDIAC MARKERS ( 01 Dec 2019 19:32 )  x     / <0.01 ng/mL / x     / x     / x          CAPILLARY BLOOD GLUCOSE        PT/INR - ( 02 Dec 2019 05:44 )   PT: 13.80 sec;   INR: 1.20 ratio         PTT - ( 02 Dec 2019 05:44 )  PTT:41.2 sec    Culture        MEDICATIONS  (STANDING):  chlorhexidine 4% Liquid 1 Application(s) Topical <User Schedule>  cholecalciferol 1000 Unit(s) Oral daily  hydrochlorothiazide 12.5 milliGRAM(s) Oral daily  levothyroxine 137 MICROGram(s) Oral daily  meclizine 25 milliGRAM(s) Oral daily  pantoprazole    Tablet 40 milliGRAM(s) Oral before breakfast  sodium chloride 0.9%. 1000 milliLiter(s) (75 mL/Hr) IV Continuous <Continuous>    MEDICATIONS  (PRN):  ALPRAZolam 0.25 milliGRAM(s) Oral three times a day PRN anxiety  traMADol 50 milliGRAM(s) Oral every 4 hours PRN Moderate Pain (4 - 6)        RADIOLOGY: ***   < from: CT Angio Chest w/ IV Cont (19 @ 21:59) >  1.  Right lower lung segmental pulmonary emboli. No right-sided heart   strain.    < end of copied text >    CXR:  TLC:  OG:  ET tube:        CAM ICU:  ECHO:

## 2019-12-02 NOTE — CONSULT NOTE ADULT - ASSESSMENT
IMPRESSION:  PE      PLAN:    CNS: no sedation     HEENT: oral care     PULMONARY: keep pox > 92 %     CARDIOVASCULAR: echo     GI: GI prophylaxis.  Feeding     RENAL: follow lytes IS and OS     INFECTIOUS DISEASE: no abx     HEMATOLOGICAL:  DVT prophylaxis. lovenox 1mg / kg Q 12 hrs and can switch to eliquis   might need life long   follow doppler lower ext     ENDOCRINE:  Follow up FS.  Insulin protocol if needed.  from pulmonary she can be transferd to floor if ok by cardiology   need outpatient follow up         CRITICAL CARE TIME SPENT: *** IMPRESSION:  PE ?? provoked       PLAN:    CNS: no sedation     HEENT: oral care     PULMONARY: keep pox > 92 %     CARDIOVASCULAR: echo     GI: GI prophylaxis.  Feeding     RENAL: follow lytes IS and OS     INFECTIOUS DISEASE: no abx     HEMATOLOGICAL:  DVT prophylaxis. lovenox 1mg / kg Q 12 hrs and can switch to eliquis   n resident will speak to her orthopedic if any contraindication   follow doppler lower ext     ENDOCRINE:  Follow up FS.  Insulin protocol if needed.  from pulmonary she can be transferd to floor if ok by cardiology   need outpatient follow up     transfer to floor     CRITICAL CARE TIME SPENT: ***

## 2019-12-02 NOTE — PROGRESS NOTE ADULT - ASSESSMENT
# Right lower lobe pulmonary emboli:  No right heart strain  switched to Eliquis this AM, discussed with her orthopedics surgeon, no contraindication to start anticoagulation  f/u doppler of lower ext  Her left sided chest pain is not cardiac, no EKG changes, no troponinemia    # Hypothyroidism:  c/w levothyroxine    #GERD;  c/w protonix    # Constipation:  Miralax    # Dispo:  She still feels like having palpitation when ambulating, so wants to be discharged tomorrow  will downgrade to general floor

## 2019-12-02 NOTE — PROGRESS NOTE ADULT - SUBJECTIVE AND OBJECTIVE BOX
Patient is a 69y old  Female who presents with a chief complaint of pulmonary embolus (02 Dec 2019 08:55)      Interval events:  Feels better this AM    PAST MEDICAL & SURGICAL HISTORY:  Mitral valve prolapse  H/O: rheumatic fever  Herniated disc, cervical  History of palpitations  ETHAN on CPAP  Migraines  Cataract  Gastroesophageal reflux disease  Hypothyroid  Hypertension  Cataract extraction status of eye, left  H/O knee surgery: torn minsicus repair b/l knee  H/O:       MEDICATIONS  (STANDING):  apixaban 10 milliGRAM(s) Oral every 12 hours  chlorhexidine 4% Liquid 1 Application(s) Topical <User Schedule>  cholecalciferol 1000 Unit(s) Oral daily  hydrochlorothiazide 12.5 milliGRAM(s) Oral daily  levothyroxine 137 MICROGram(s) Oral daily  meclizine 25 milliGRAM(s) Oral daily  pantoprazole    Tablet 40 milliGRAM(s) Oral before breakfast  polyethylene glycol 3350 17 Gram(s) Oral daily    MEDICATIONS  (PRN):  ALPRAZolam 0.25 milliGRAM(s) Oral three times a day PRN anxiety  traMADol 50 milliGRAM(s) Oral every 4 hours PRN Moderate Pain (4 - 6)          Vital Signs Last 24 Hrs  T(C): 36.2 (02 Dec 2019 07:01), Max: 37.1 (01 Dec 2019 19:18)  T(F): 97.2 (02 Dec 2019 07:01), Max: 98.7 (01 Dec 2019 19:18)  HR: 72 (02 Dec 2019 07:11) (72 - 92)  BP: 121/57 (02 Dec 2019 07:11) (109/55 - 138/65)  BP(mean): 82 (02 Dec 2019 07:11) (82 - 85)  RR: 18 (02 Dec 2019 07:01) (18 - 20)  SpO2: 98% (02 Dec 2019 00:31) (98% - 100%)  CAPILLARY BLOOD GLUCOSE        I&O's Summary    01 Dec 2019 07:01  -  02 Dec 2019 07:00  --------------------------------------------------------  IN: 525 mL / OUT: 0 mL / NET: 525 mL    02 Dec 2019 07:01  -  02 Dec 2019 14:23  --------------------------------------------------------  IN: 420 mL / OUT: 0 mL / NET: 420 mL        Physical Exam:    -     General : Lying in bed, no acute distress    -      Cardiac: Regular rate and rhythm, no murmur    -      Pulm: b/l clear    -      GI: soft non tender    -      Musculoskeletal: no edema    -      Neuro: AO x 3 non focal        Labs:                        9.5    8.46  )-----------( 401      ( 02 Dec 2019 05:44 )             29.0             12    141  |  100  |  21<H>  ----------------------------<  91  3.6   |  28  |  0.7    Ca    8.9      02 Dec 2019 05:44  Mg     2.1     12    TPro  6.5  /  Alb  3.7  /  TBili  0.5  /  DBili  x   /  AST  15  /  ALT  17  /  AlkPhos  91  12-    LIVER FUNCTIONS - ( 02 Dec 2019 05:44 )  Alb: 3.7 g/dL / Pro: 6.5 g/dL / ALK PHOS: 91 U/L / ALT: 17 U/L / AST: 15 U/L / GGT: x                 PT/INR - ( 02 Dec 2019 05:44 )   PT: 13.80 sec;   INR: 1.20 ratio         PTT - ( 02 Dec 2019 05:44 )  PTT:41.2 sec  CARDIAC MARKERS ( 02 Dec 2019 05:44 )  x     / <0.01 ng/mL / x     / x     / x      CARDIAC MARKERS ( 01 Dec 2019 19:32 )  x     / <0.01 ng/mL / x     / x     / x                  Imaging:    ECG:

## 2019-12-02 NOTE — CONSULT NOTE ADULT - ASSESSMENT
Patient with l sided chest pain. Reportedly r sided pe. She needs a ago. Complaining palpitations. NSR. Need ambulate. Review ct scan. Heparin or lovenox. When stable xarelto.

## 2019-12-02 NOTE — PATIENT PROFILE ADULT - STATED REASON FOR ADMISSION
pt recently had R Humerous repair at [facility] due to fall, experienced L sided inter. chest pain. Admitted for +PE R side

## 2019-12-02 NOTE — CONSULT NOTE ADULT - SUBJECTIVE AND OBJECTIVE BOX
CARDIOLOGY CONSULT NOTE     CHIEF COMPLAINT/REASON FOR CONSULT:    HPI:  68 y/o F with PMH ETHAN on CPAP, hypothyroidism, HTN, recent fall with R humerus fx, s/p repair 1 wk ago @ [facility] presents with L intermittent sharp, non-radiating reproducible lateral chest wall pain lasting seconds x 1 hr. no palliating/provoking factors. no associated symptoms. no hx stress test in the past due to refusal. no known hx CAD.   Denies hemoptysis, hx cancers, hx PE/DVT, hormone use. (01 Dec 2019 23:11)      PAST MEDICAL & SURGICAL HISTORY:  Mitral valve prolapse  H/O: rheumatic fever  Herniated disc, cervical  History of palpitations  ETHAN on CPAP  Migraines  Cataract  Gastroesophageal reflux disease  Hypothyroid  Hypertension  Cataract extraction status of eye, left  H/O knee surgery: torn minsicus repair b/l knee  H/O:       Cardiac Risks:   [x ]HTN, [ ] DM, [ ] Smoking, [ ] FH,  [x ] Lipids        MEDICATIONS:  MEDICATIONS  (STANDING):  chlorhexidine 4% Liquid 1 Application(s) Topical <User Schedule>  cholecalciferol 1000 Unit(s) Oral daily  hydrochlorothiazide 12.5 milliGRAM(s) Oral daily  levothyroxine 137 MICROGram(s) Oral daily  meclizine 25 milliGRAM(s) Oral daily  pantoprazole    Tablet 40 milliGRAM(s) Oral before breakfast  sodium chloride 0.9%. 1000 milliLiter(s) (75 mL/Hr) IV Continuous <Continuous>      FAMILY HISTORY:      SOCIAL HISTORY:      [ ] Marital status    Allergies    codeine (Vomiting; Nausea)  cortisone (Other; Rash; Pruritus)  latex (Pruritus; Rash)  lidocaine topical (Rash)  penicillin ([place] Synd; Rash; Pruritus)  predniSONE (Other; Rash)      	    REVIEW OF SYSTEMS:  CONSTITUTIONAL: No fever, weight loss, or fatigue  EYES: No eye pain, visual disturbances, or discharge  ENMT:  No difficulty hearing, tinnitus, vertigo; No sinus or throat pain  NECK: No pain or stiffness  RESPIRATORY: No cough, wheezing, chills or hemoptysis; No Shortness of Breath  CARDIOVASCULAR: See above  GASTROINTESTINAL: No abdominal or epigastric pain. No nausea, vomiting, or hematemesis; No diarrhea or constipation. No melena or hematochezia.  GENITOURINARY: No dysuria, frequency, hematuria, or incontinence  NEUROLOGICAL: No headaches, memory loss, loss of strength, numbness, or tremors  SKIN: No itching, burning, rashes, or lesions   	    PHYSICAL EXAM:  T(C): 36.2 (19 @ 07:01), Max: 37.1 (19 @ 19:18)  HR: 72 (19 @ 06:33) (72 - 92)  BP: 117/59 (19 @ 00:31) (109/55 - 138/65)  RR: 18 (19 @ 07:01) (18 - 20)  SpO2: 98% (19 @ 00:31) (98% - 100%)  Wt(kg): --  I&O's Summary    01 Dec 2019 07:01  -  02 Dec 2019 07:00  --------------------------------------------------------  IN: 450 mL / OUT: 0 mL / NET: 450 mL        Appearance: Normal	  Psychiatry: A & O x 3, Mood & affect appropriate  HEENT:   Normal oral mucosa, PERRL, EOMI	  Lymphatic: No lymphadenopathy  Cardiovascular: Normal S1 S2,RRR, No JVD, No murmurs  Respiratory: Lungs clear to auscultation	  Gastrointestinal:  Soft, Non-tender, + BS	  Skin: No rashes, No ecchymoses, No cyanosis	  Neurologic: Non-focal  Extremities: Normal range of motion, No clubbing, cyanosis or edema  Vascular: Peripheral pulses palpable 2+ bilaterally      ECG:  < from: 12 Lead ECG (19 @ 18:38) >    Diagnosis Line Normal sinus rhythm  ST & T wave abnormality, consider anterior ischemia  Abnormal ECG    Confirmed by RAYMOND BARTON MD ([id number]) on 2019 9:59:27 AM    < end of copied text >      	  LABS:	 	    CARDIAC MARKERS:          Serum Pro-Brain Natriuretic Peptide: 63 pg/mL ( @ 19:32)                            9.5    8.46  )-----------( 401      ( 02 Dec 2019 05:44 )             29.0         141  |  100  |  21<H>  ----------------------------<  91  3.6   |  28  |  0.7    Ca    8.9      02 Dec 2019 05:44  Mg     2.1         TPro  6.5  /  Alb  3.7  /  TBili  0.5  /  DBili  x   /  AST  15  /  ALT  17  /  AlkPhos  91  12    PT/INR - ( 02 Dec 2019 05:44 )   PT: 13.80 sec;   INR: 1.20 ratio         PTT - ( 02 Dec 2019 05:44 )  PTT:41.2 sec  proBNP: Serum Pro-Brain Natriuretic Peptide: 63 pg/mL ( @ 19:32)

## 2019-12-02 NOTE — PROGRESS NOTE ADULT - SUBJECTIVE AND OBJECTIVE BOX
Patient is a 69y old  Female who presents with a chief complaint of pulmonary embolus (02 Dec 2019 14:22)      T(F): 97.2 (12-02-19 @ 07:01), Max: 98.7 (12-01-19 @ 19:18)  HR: 72 (12-02-19 @ 07:11)  BP: 121/57 (12-02-19 @ 07:11)  RR: 18 (12-02-19 @ 07:01)  SpO2: 98% (12-02-19 @ 00:31) (98% - 100%)    PHYSICAL EXAM:  GENERAL: NAD, well-groomed, well-developed  HEAD:  Atraumatic, Normocephalic  EYES: EOMI, PERRLA, conjunctiva and sclera clear  ENMT: No tonsillar erythema, exudates, or enlargement; Moist mucous membranes, Good dentition, No lesions  NECK: Supple, No JVD, Normal thyroid  NERVOUS SYSTEM:  Alert & Oriented X3, Good concentration; Motor Strength 5/5 B/L upper and lower extremities; DTRs 2+ intact and symmetric  CHEST/LUNG: Clear to percussion bilaterally; No rales, rhonchi, wheezing, or rubs  HEART: Regular rate and rhythm; No murmurs, rubs, or gallops  ABDOMEN: Soft, Nontender, Nondistended; Bowel sounds present  EXTREMITIES:  2+ Peripheral Pulses, No clubbing, cyanosis, or edema  LYMPH: No lymphadenopathy noted  SKIN: No rashes or lesions    LABS  12-02    141  |  100  |  21<H>  ----------------------------<  91  3.6   |  28  |  0.7    Ca    8.9      02 Dec 2019 05:44  Mg     2.1     12-01    TPro  6.5  /  Alb  3.7  /  TBili  0.5  /  DBili  x   /  AST  15  /  ALT  17  /  AlkPhos  91  12-02                          9.5    8.46  )-----------( 401      ( 02 Dec 2019 05:44 )             29.0     PT/INR - ( 02 Dec 2019 05:44 )   PT: 13.80 sec;   INR: 1.20 ratio         PTT - ( 02 Dec 2019 05:44 )  PTT:41.2 sec    CARDIAC ENZYMES      Troponin T, Serum: <0.01 ng/mL (12-02-19 @ 05:44)  Troponin T, Serum: <0.01 ng/mL (12-01-19 @ 19:32)    RADIOLOGY  < from: CT Angio Chest w/ IV Cont (12.01.19 @ 21:59) >  FINDINGS:    PULMONARY EMBOLUS: Right lower lung segmental pulmonary emboli (series 3,   image 82). No right-sided heart strain.    LUNGS, PLEURA, AIRWAYS: Diffuse emphysematous changes. Mild fibrotic   changes in the apical regions. No lobar consolidation, mass, effusion, or   pneumothorax. No evidence of central endobronchial obstruction. No   bronchiectasis or honeycombing.    < end of copied text >    MEDICATIONS  (STANDING):  apixaban 10 milliGRAM(s) Oral every 12 hours  chlorhexidine 4% Liquid 1 Application(s) Topical <User Schedule>  cholecalciferol 1000 Unit(s) Oral daily  hydrochlorothiazide 12.5 milliGRAM(s) Oral daily  levothyroxine 137 MICROGram(s) Oral daily  meclizine 25 milliGRAM(s) Oral daily  pantoprazole    Tablet 40 milliGRAM(s) Oral before breakfast  polyethylene glycol 3350 17 Gram(s) Oral daily    MEDICATIONS  (PRN):  ALPRAZolam 0.25 milliGRAM(s) Oral three times a day PRN anxiety  traMADol 50 milliGRAM(s) Oral every 4 hours PRN Moderate Pain (4 - 6)

## 2019-12-02 NOTE — PROGRESS NOTE ADULT - ASSESSMENT
70 y/o F with PMH ETHAN on CPAP, COPD, hypothyroidism, HTN, recent fall with R humerus fx, s/p repair 1 wk ago @ [facility] presents with L intermittent sharp, non-radiating  chest pain . no palliating/provoking factors. no associated symptoms. no hx stress test in the past due to refusal. Pt was found with pulmonary embolism and started on oral Eliquis. Pt is stable to be discharged home today on previous medications plus Eliquis 10mg 2 x daily for 7 days then Eliquis 5mg 2 x daily. Pt is instructed to f/u with pulmonary as an outpt. 36min spent on dc

## 2019-12-03 ENCOUNTER — TRANSCRIPTION ENCOUNTER (OUTPATIENT)
Age: 69
End: 2019-12-03

## 2019-12-03 VITALS
SYSTOLIC BLOOD PRESSURE: 128 MMHG | DIASTOLIC BLOOD PRESSURE: 63 MMHG | TEMPERATURE: 98 F | RESPIRATION RATE: 18 BRPM | HEART RATE: 80 BPM

## 2019-12-03 LAB
ANION GAP SERPL CALC-SCNC: 12 MMOL/L — SIGNIFICANT CHANGE UP (ref 7–14)
BUN SERPL-MCNC: 19 MG/DL — SIGNIFICANT CHANGE UP (ref 10–20)
CALCIUM SERPL-MCNC: 8.8 MG/DL — SIGNIFICANT CHANGE UP (ref 8.5–10.1)
CHLORIDE SERPL-SCNC: 100 MMOL/L — SIGNIFICANT CHANGE UP (ref 98–110)
CO2 SERPL-SCNC: 27 MMOL/L — SIGNIFICANT CHANGE UP (ref 17–32)
CREAT SERPL-MCNC: 0.7 MG/DL — SIGNIFICANT CHANGE UP (ref 0.7–1.5)
GLUCOSE SERPL-MCNC: 103 MG/DL — HIGH (ref 70–99)
HCT VFR BLD CALC: 28.8 % — LOW (ref 37–47)
HGB BLD-MCNC: 9.3 G/DL — LOW (ref 12–16)
MCHC RBC-ENTMCNC: 29.6 PG — SIGNIFICANT CHANGE UP (ref 27–31)
MCHC RBC-ENTMCNC: 32.3 G/DL — SIGNIFICANT CHANGE UP (ref 32–37)
MCV RBC AUTO: 91.7 FL — SIGNIFICANT CHANGE UP (ref 81–99)
NRBC # BLD: 0 /100 WBCS — SIGNIFICANT CHANGE UP (ref 0–0)
PLATELET # BLD AUTO: 403 K/UL — HIGH (ref 130–400)
POTASSIUM SERPL-MCNC: 3.9 MMOL/L — SIGNIFICANT CHANGE UP (ref 3.5–5)
POTASSIUM SERPL-SCNC: 3.9 MMOL/L — SIGNIFICANT CHANGE UP (ref 3.5–5)
RBC # BLD: 3.14 M/UL — LOW (ref 4.2–5.4)
RBC # FLD: 14.3 % — SIGNIFICANT CHANGE UP (ref 11.5–14.5)
SODIUM SERPL-SCNC: 139 MMOL/L — SIGNIFICANT CHANGE UP (ref 135–146)
WBC # BLD: 6.99 K/UL — SIGNIFICANT CHANGE UP (ref 4.8–10.8)
WBC # FLD AUTO: 6.99 K/UL — SIGNIFICANT CHANGE UP (ref 4.8–10.8)

## 2019-12-03 PROCEDURE — 99239 HOSP IP/OBS DSCHRG MGMT >30: CPT

## 2019-12-03 RX ORDER — LANOLIN ALCOHOL/MO/W.PET/CERES
3 CREAM (GRAM) TOPICAL ONCE
Refills: 0 | Status: COMPLETED | OUTPATIENT
Start: 2019-12-03 | End: 2019-12-03

## 2019-12-03 RX ORDER — MECLIZINE HCL 12.5 MG
1 TABLET ORAL
Qty: 21 | Refills: 0
Start: 2019-12-03 | End: 2019-12-09

## 2019-12-03 RX ORDER — APIXABAN 2.5 MG/1
2 TABLET, FILM COATED ORAL
Qty: 66 | Refills: 0
Start: 2019-12-03 | End: 2020-01-01

## 2019-12-03 RX ORDER — NYSTATIN CREAM 100000 [USP'U]/G
1 CREAM TOPICAL THREE TIMES A DAY
Refills: 0 | Status: DISCONTINUED | OUTPATIENT
Start: 2019-12-03 | End: 2019-12-03

## 2019-12-03 RX ORDER — MULTIVIT-MIN/FERROUS GLUCONATE 9 MG/15 ML
0 LIQUID (ML) ORAL
Qty: 0 | Refills: 0 | DISCHARGE

## 2019-12-03 RX ORDER — POLYETHYLENE GLYCOL 3350 17 G/17G
17 POWDER, FOR SOLUTION ORAL
Qty: 0 | Refills: 0 | DISCHARGE
Start: 2019-12-03

## 2019-12-03 RX ORDER — APIXABAN 2.5 MG/1
2 TABLET, FILM COATED ORAL
Qty: 8 | Refills: 0
Start: 2019-12-03 | End: 2019-12-04

## 2019-12-03 RX ADMIN — NYSTATIN CREAM 1 APPLICATION(S): 100000 CREAM TOPICAL at 11:49

## 2019-12-03 RX ADMIN — PANTOPRAZOLE SODIUM 40 MILLIGRAM(S): 20 TABLET, DELAYED RELEASE ORAL at 06:35

## 2019-12-03 RX ADMIN — Medication 12.5 MILLIGRAM(S): at 06:53

## 2019-12-03 RX ADMIN — Medication 650 MILLIGRAM(S): at 11:48

## 2019-12-03 RX ADMIN — POLYETHYLENE GLYCOL 3350 17 GRAM(S): 17 POWDER, FOR SOLUTION ORAL at 11:49

## 2019-12-03 RX ADMIN — Medication 137 MICROGRAM(S): at 06:53

## 2019-12-03 RX ADMIN — APIXABAN 10 MILLIGRAM(S): 2.5 TABLET, FILM COATED ORAL at 10:10

## 2019-12-03 RX ADMIN — Medication 1000 UNIT(S): at 11:48

## 2019-12-03 RX ADMIN — Medication 3 MILLIGRAM(S): at 02:57

## 2019-12-03 NOTE — DISCHARGE NOTE PROVIDER - PROVIDER TOKENS
FREE:[LAST:[Primary pulmonary],PHONE:[(   )    -],FAX:[(   )    -],FOLLOWUP:[2 weeks]],PROVIDER:[TOKEN:[[id number]],FOLLOWUP:[2 weeks]]

## 2019-12-03 NOTE — PROGRESS NOTE ADULT - SUBJECTIVE AND OBJECTIVE BOX
RICHARD KNOX    Patient is a 69y old  Female who presents with a chief complaint of pulmonary embolus (02 Dec 2019 14:27)    INTERVAL HPI/OVERNIGHT EVENTS: no events overnight, pt transferred from CCU where she was admitted with acute PE. Pt denies chest pain, no SOB. Pt is able to ambulate with no assistance.     ROS: All ROS negative    PHYSICAL EXAM:  T(C): 36.4, Max: 36.8 (12-02-19 @ 15:30)  HR: 80 (79 - 86)  BP: 128/63 (117/56 - 128/63)  RR: 18 (18 - 19)  SpO2: --    GENERAL: NAD  PULMONARY/CHEST: No rales, rhonchi, wheezing  CARDIOVASC: Regular rate and rhythm; No murmurs  GI/ABDOMEN: Soft, Nontender, Nondistended; Bowel sounds present  EXTREMITIES: No clubbing, cyanosis, or edema, no deformity. No calf tenderness b/l. RUE: shoulder dressing intact, dry, clean, no surrounding skin erythema, arm in sling.   NERVOUS SYSTEM:  Alert & Oriented X3, no focal deficit     Consultant(s) Notes Reviewed by me.     LABS:                        9.3    6.99  )-----------( 403      ( 03 Dec 2019 07:01 )             28.8     12-03    139  |  100  |  19  ----------------------------<  103<H>  3.9   |  27  |  0.7    Ca    8.8      03 Dec 2019 07:01  Mg     2.1     12-01    TPro  6.5  /  Alb  3.7  /  TBili  0.5  /  DBili  x   /  AST  15  /  ALT  17  /  AlkPhos  91  12-02    PT/INR - ( 02 Dec 2019 05:44 )   PT: 13.80 sec;   INR: 1.20 ratio         PTT - ( 02 Dec 2019 05:44 )  PTT:41.2 sec    ECHO 12/2/2019: LV is normal in size and overall function. no segmental wall motion abnormality. RV is normal size and function    RADIOLOGY  < from: CT Angio Chest w/ IV Cont (12.01.19 @ 21:59) >  FINDINGS:    PULMONARY EMBOLUS: Right lower lung segmental pulmonary emboli (series 3,   image 82). No right-sided heart strain.    LUNGS, PLEURA, AIRWAYS: Diffuse emphysematous changes. Mild fibrotic   changes in the apical regions. No lobar consolidation, mass, effusion, or   pneumothorax. No evidence of central endobronchial obstruction. No   bronchiectasis or honeycombing.    < end of copied text >    MEDICATIONS  (STANDING):  apixaban 10 milliGRAM(s) Oral every 12 hours  chlorhexidine 4% Liquid 1 Application(s) Topical <User Schedule>  cholecalciferol 1000 Unit(s) Oral daily  hydrochlorothiazide 12.5 milliGRAM(s) Oral daily  levothyroxine 137 MICROGram(s) Oral daily  meclizine 25 milliGRAM(s) Oral daily  pantoprazole    Tablet 40 milliGRAM(s) Oral before breakfast  polyethylene glycol 3350 17 Gram(s) Oral daily    MEDICATIONS  (PRN):  acetaminophen   Tablet .. 650 milliGRAM(s) Oral every 6 hours PRN Moderate Pain (4 - 6)  ALPRAZolam 0.25 milliGRAM(s) Oral three times a day PRN anxiety  traMADol 50 milliGRAM(s) Oral every 4 hours PRN Moderate Pain (4 - 6)

## 2019-12-03 NOTE — PROGRESS NOTE ADULT - ASSESSMENT
IMPRESSION:  PE ?? provoked       PLAN:  from pulmonary check pox Ra rest and exertion   eliquis Q 10 mg Q 12hrs for 7 days then 5mg Q 12 hrs   follow with Dr stewart in 3-4 weeks   case discussed with hospitalist

## 2019-12-03 NOTE — DISCHARGE NOTE PROVIDER - NSDCMRMEDTOKEN_GEN_ALL_CORE_FT
butalbital: 2 times a day, As Needed  Eliquis Starter Pack for Treatment of DVT and PE 5 mg oral tablet: 2 tab(s) orally every 12 hours for 6 more days, until 12/8, starting from 12/9 take 5 mg 2 times a day. Follow up with PMD for refill.  hydroCHLOROthiazide 12.5 mg oral tablet: 1 tab(s) orally once a day  meclizine 25 mg oral tablet: 1 tab(s) orally 3 times a day, As Needed   NexIUM:   polyethylene glycol 3350 oral powder for reconstitution: 17 gram(s) orally once a day, As Needed  Synthroid 137 mcg (0.137 mg) oral tablet: 1 tab(s) orally once a day  traMADol 50 mg oral tablet: 1 tab(s) orally every 4 hours MDD:6  Vitamin D3: 2 tab(s) orally once a day  Xanax 0.25 mg oral tablet: 1 tab(s) orally 3 times a day

## 2019-12-03 NOTE — DISCHARGE NOTE PROVIDER - NSDCCPCAREPLAN_GEN_ALL_CORE_FT
PRINCIPAL DISCHARGE DIAGNOSIS  Diagnosis: Pulmonary embolism  Assessment and Plan of Treatment: Take Eliquis 10 mg 2 times a day for 6 more days, then decrease dose to 5 mg two times a day. Follow up with Pulmonary and PMD.  Age appropriate cancer screening with PMD.      SECONDARY DISCHARGE DIAGNOSES  Diagnosis: ETHAN on CPAP  Assessment and Plan of Treatment: Continue CPAP as recommended by your pulmonologist    Diagnosis: Vitamin D deficiency  Assessment and Plan of Treatment: cont supplement, check level with PMD.    Diagnosis: Hypothyroidism  Assessment and Plan of Treatment: Your TSH was slightly elevated, repeat level with PMD and adjust medications as OP.

## 2019-12-03 NOTE — DISCHARGE NOTE PROVIDER - CARE PROVIDER_API CALL
Primary pulmonary,   Phone: (   )    -  Fax: (   )    -  Follow Up Time: 2 weeks    Jordan Escobar (DO)  [facility]  [address] [place] [address]  Phone: [phone]  Fax: [phone]  Follow Up Time: 2 weeks

## 2019-12-03 NOTE — DISCHARGE NOTE PROVIDER - HOSPITAL COURSE
68 y/o F with PMH ETHAN on CPAP, COPD, hypothyroidism, HTN, recent fall with R humerus fx, s/p repair 1 wk ago @ [facility] presents with L intermittent sharp, non-radiating  chest pain. Pt was found with pulmonary embolism and started on oral Eliquis.     ECHO 12/2/2019: LV is normal in size and overall function. no segmental wall motion abnormality. RV is normal size and function        CT Angio Chest w/ IV Cont (12.01.19 @ 21:59) FINDINGS:        PULMONARY EMBOLUS: Right lower lung segmental pulmonary emboli (series 3,     image 82). No right-sided heart strain.        LUNGS, PLEURA, AIRWAYS: Diffuse emphysematous changes. Mild fibrotic     changes in the apical regions. No lobar consolidation, mass, effusion, or     pneumothorax. No evidence of central endobronchial obstruction. No     bronchiectasis or honeycombing.            # PE, likely provoked by recent surgery, pt states she was immobile for 4 days prior to surgery.     - pt advised to complete age appropriate cancer screening with PMD as OP, pt states she does not have PMD and she does not trust doctors in [place]. I advised to find PMD. Colonoscopy, EGD and mammography are up to date.     - pt is on Eliquis 10 mg BID for 7 days then 50 mg BID for 3 months.     - Pt is instructed to f/u with pulmonary as an outpt, she states she has a doctor in [place].         # Elevated TSH, mildly, pt does not have clinical sings of hypothyroidism. Pt advised to repeat TFT with PMD and adjust Levothyroxine.        # COPD - no signs of exacerbation, cont home meds        # ETHAN - cont CPAP        # Hx of Vitamin D deficiency - cont supplement, check level with PMD.        # R humerus fracture - OP f/u with ortho, cont Tramadol PRN and bowel regimen.        Pt is clinically stable for discharge home today. CM will confirm that Eliquis covers by insurance.

## 2019-12-03 NOTE — PROGRESS NOTE ADULT - ASSESSMENT
68 y/o F with PMH ETHAN on CPAP, COPD, hypothyroidism, HTN, recent fall with R humerus fx, s/p repair 1 wk ago @ [facility] presents with L intermittent sharp, non-radiating  chest pain. Pt was found with pulmonary embolism and started on oral Eliquis.     # PE, likely provoked by recent surgery, pt states she was immobile for 4 days prior to surgery.   - pt advised to complete age appropriate cancer screening with PMD as OP, pt states she does not have PMD and she does not trust doctors in [place]. I advised to find PMD.  - pt is on Eliquis 10 mg BID for 7 days then 50 mg BID for 3 months.   - Pt is instructed to f/u with pulmonary as an outpt, she states she has a doctor in [place].     # Elevated TSH, mildly, pt does not have clinical sings of hypothyroidism. Pt advised to repeat TFT with PMD and adjust Levothyroxine.    # COPD - no signs of exacerbation, cont home meds    # ETHAN - cont CPAP    # Hx of Vitamin D deficiency - cont supplement, check level with PMD.    # R humerus fracture - OP f/u with ortho, cont Tramadol PRN and bowel regimen.    Pt is clinically stable for discharge home today. CM will confirm that Eliquis covers by insurance.

## 2019-12-03 NOTE — PROGRESS NOTE ADULT - SUBJECTIVE AND OBJECTIVE BOX
Patient is a 69y old  Female who presents with a chief complaint of pulmonary embolus (03 Dec 2019 08:44)      Over Night Events:  Patient seen and examined feel better not on distress       ROS:  See HPI    PHYSICAL EXAM    ICU Vital Signs Last 24 Hrs  T(C): 36.4 (03 Dec 2019 05:28), Max: 36.8 (02 Dec 2019 15:30)  T(F): 97.5 (03 Dec 2019 05:28), Max: 98.3 (02 Dec 2019 15:30)  HR: 80 (03 Dec 2019 05:28) (79 - 86)  BP: 128/63 (03 Dec 2019 05:28) (117/56 - 128/63)  BP(mean): 83 (02 Dec 2019 20:26) (81 - 83)  ABP: --  ABP(mean): --  RR: 18 (03 Dec 2019 05:28) (18 - 19)  SpO2: --      General: Aox3  HEENT:   laura             Lymph Nodes: NO cervical LN   Lungs: Bilateral BS  Cardiovascular: Regular   Abdomen: Soft, Positive BS  Extremities: No clubbing   Skin: warm   Neurological: no focal deficit   Musculoskeletal: move all ext     I&O's Detail    02 Dec 2019 07:01  -  03 Dec 2019 07:00  --------------------------------------------------------  IN:    Oral Fluid: 120 mL    sodium chloride 0.9%: 300 mL  Total IN: 420 mL    OUT:  Total OUT: 0 mL    Total NET: 420 mL          LABS:                          9.3    6.99  )-----------( 403      ( 03 Dec 2019 07:01 )             28.8         03 Dec 2019 07:01    139    |  100    |  19     ----------------------------<  103    3.9     |  27     |  0.7      Ca    8.8        03 Dec 2019 07:01  Mg     2.1       01 Dec 2019 19:32                                               PT/INR - ( 02 Dec 2019 05:44 )   PT: 13.80 sec;   INR: 1.20 ratio         PTT - ( 02 Dec 2019 05:44 )  PTT:41.2 sec                                             CARDIAC MARKERS ( 02 Dec 2019 05:44 )  x     / <0.01 ng/mL / x     / x     / x      CARDIAC MARKERS ( 01 Dec 2019 19:32 )  x     / <0.01 ng/mL / x     / x     / x                                                                                                                                                 MEDICATIONS  (STANDING):  apixaban 10 milliGRAM(s) Oral every 12 hours  chlorhexidine 4% Liquid 1 Application(s) Topical <User Schedule>  cholecalciferol 1000 Unit(s) Oral daily  hydrochlorothiazide 12.5 milliGRAM(s) Oral daily  levothyroxine 137 MICROGram(s) Oral daily  meclizine 25 milliGRAM(s) Oral daily  nystatin Powder 1 Application(s) Topical three times a day  pantoprazole    Tablet 40 milliGRAM(s) Oral before breakfast  polyethylene glycol 3350 17 Gram(s) Oral daily    MEDICATIONS  (PRN):  acetaminophen   Tablet .. 650 milliGRAM(s) Oral every 6 hours PRN Moderate Pain (4 - 6)  ALPRAZolam 0.25 milliGRAM(s) Oral three times a day PRN anxiety  traMADol 50 milliGRAM(s) Oral every 4 hours PRN Moderate Pain (4 - 6)          Xrays:  TLC:  OG:  ET tube:                                                                                      doppler lower ext negative

## 2019-12-04 ENCOUNTER — APPOINTMENT (OUTPATIENT)
Dept: RADIOLOGY | Facility: CLINIC | Age: 69
End: 2019-12-04

## 2019-12-04 ENCOUNTER — OUTPATIENT (OUTPATIENT)
Dept: OUTPATIENT SERVICES | Facility: HOSPITAL | Age: 69
LOS: 1 days | End: 2019-12-04
Payer: MEDICARE

## 2019-12-04 DIAGNOSIS — Z98.42 CATARACT EXTRACTION STATUS, LEFT EYE: Chronic | ICD-10-CM

## 2019-12-04 DIAGNOSIS — Z98.891 HISTORY OF UTERINE SCAR FROM PREVIOUS SURGERY: Chronic | ICD-10-CM

## 2019-12-04 DIAGNOSIS — Z98.890 OTHER SPECIFIED POSTPROCEDURAL STATES: Chronic | ICD-10-CM

## 2019-12-04 PROCEDURE — 73030 X-RAY EXAM OF SHOULDER: CPT | Mod: 26,RT

## 2019-12-06 PROCEDURE — 36415 COLL VENOUS BLD VENIPUNCTURE: CPT

## 2019-12-06 PROCEDURE — C1889: CPT

## 2019-12-06 PROCEDURE — 85027 COMPLETE CBC AUTOMATED: CPT

## 2019-12-06 PROCEDURE — 99285 EMERGENCY DEPT VISIT HI MDM: CPT | Mod: 25

## 2019-12-06 PROCEDURE — 81001 URINALYSIS AUTO W/SCOPE: CPT

## 2019-12-06 PROCEDURE — 86850 RBC ANTIBODY SCREEN: CPT

## 2019-12-06 PROCEDURE — C1713: CPT

## 2019-12-06 PROCEDURE — 80053 COMPREHEN METABOLIC PANEL: CPT

## 2019-12-06 PROCEDURE — 80048 BASIC METABOLIC PNL TOTAL CA: CPT

## 2019-12-06 PROCEDURE — 96374 THER/PROPH/DIAG INJ IV PUSH: CPT

## 2019-12-06 PROCEDURE — 85730 THROMBOPLASTIN TIME PARTIAL: CPT

## 2019-12-06 PROCEDURE — 86900 BLOOD TYPING SEROLOGIC ABO: CPT

## 2019-12-06 PROCEDURE — 71045 X-RAY EXAM CHEST 1 VIEW: CPT

## 2019-12-06 PROCEDURE — 73020 X-RAY EXAM OF SHOULDER: CPT

## 2019-12-06 PROCEDURE — 85610 PROTHROMBIN TIME: CPT

## 2019-12-06 PROCEDURE — 82652 VIT D 1 25-DIHYDROXY: CPT

## 2019-12-06 PROCEDURE — 94640 AIRWAY INHALATION TREATMENT: CPT

## 2019-12-06 PROCEDURE — 93005 ELECTROCARDIOGRAM TRACING: CPT

## 2019-12-06 PROCEDURE — 76000 FLUOROSCOPY <1 HR PHYS/QHP: CPT

## 2019-12-06 PROCEDURE — 86901 BLOOD TYPING SEROLOGIC RH(D): CPT

## 2019-12-06 PROCEDURE — 85025 COMPLETE CBC W/AUTO DIFF WBC: CPT

## 2019-12-06 PROCEDURE — 97161 PT EVAL LOW COMPLEX 20 MIN: CPT

## 2019-12-10 ENCOUNTER — APPOINTMENT (OUTPATIENT)
Dept: PULMONOLOGY | Facility: CLINIC | Age: 69
End: 2019-12-10
Payer: MEDICARE

## 2019-12-10 VITALS
BODY MASS INDEX: 28.14 KG/M2 | HEART RATE: 93 BPM | DIASTOLIC BLOOD PRESSURE: 72 MMHG | HEIGHT: 69 IN | OXYGEN SATURATION: 97 % | WEIGHT: 190 LBS | TEMPERATURE: 98 F | SYSTOLIC BLOOD PRESSURE: 116 MMHG

## 2019-12-10 DIAGNOSIS — J44.9 CHRONIC OBSTRUCTIVE PULMONARY DISEASE, UNSPECIFIED: ICD-10-CM

## 2019-12-10 DIAGNOSIS — I10 ESSENTIAL (PRIMARY) HYPERTENSION: ICD-10-CM

## 2019-12-10 DIAGNOSIS — I26.99 OTHER PULMONARY EMBOLISM WITHOUT ACUTE COR PULMONALE: ICD-10-CM

## 2019-12-10 DIAGNOSIS — M50.20 OTHER CERVICAL DISC DISPLACEMENT, UNSPECIFIED CERVICAL REGION: ICD-10-CM

## 2019-12-10 DIAGNOSIS — F41.9 ANXIETY DISORDER, UNSPECIFIED: ICD-10-CM

## 2019-12-10 DIAGNOSIS — K21.9 GASTRO-ESOPHAGEAL REFLUX DISEASE WITHOUT ESOPHAGITIS: ICD-10-CM

## 2019-12-10 DIAGNOSIS — Z79.52 LONG TERM (CURRENT) USE OF SYSTEMIC STEROIDS: ICD-10-CM

## 2019-12-10 DIAGNOSIS — Z88.5 ALLERGY STATUS TO NARCOTIC AGENT: ICD-10-CM

## 2019-12-10 DIAGNOSIS — Z88.0 ALLERGY STATUS TO PENICILLIN: ICD-10-CM

## 2019-12-10 DIAGNOSIS — E55.9 VITAMIN D DEFICIENCY, UNSPECIFIED: ICD-10-CM

## 2019-12-10 DIAGNOSIS — G47.33 OBSTRUCTIVE SLEEP APNEA (ADULT) (PEDIATRIC): ICD-10-CM

## 2019-12-10 DIAGNOSIS — Z87.09 PERSONAL HISTORY OF OTHER DISEASES OF THE RESPIRATORY SYSTEM: ICD-10-CM

## 2019-12-10 DIAGNOSIS — Z91.040 LATEX ALLERGY STATUS: ICD-10-CM

## 2019-12-10 DIAGNOSIS — E03.9 HYPOTHYROIDISM, UNSPECIFIED: ICD-10-CM

## 2019-12-10 DIAGNOSIS — K59.00 CONSTIPATION, UNSPECIFIED: ICD-10-CM

## 2019-12-10 DIAGNOSIS — S42.301A UNSPECIFIED FRACTURE OF SHAFT OF HUMERUS, RIGHT ARM, INITIAL ENCOUNTER FOR CLOSED FRACTURE: ICD-10-CM

## 2019-12-10 DIAGNOSIS — T81.718A COMPLICATION OF OTHER ARTERY FOLLOWING A PROCEDURE, NOT ELSEWHERE CLASSIFIED, INITIAL ENCOUNTER: ICD-10-CM

## 2019-12-10 DIAGNOSIS — Z78.9 OTHER SPECIFIED HEALTH STATUS: ICD-10-CM

## 2019-12-10 DIAGNOSIS — Z99.89 DEPENDENCE ON OTHER ENABLING MACHINES AND DEVICES: ICD-10-CM
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
## 2019-12-10 DIAGNOSIS — Z88.8 ALLERGY STATUS TO OTHER DRUGS, MEDICAMENTS AND BIOLOGICAL SUBSTANCES: ICD-10-CM

## 2019-12-10 DIAGNOSIS — I34.1 NONRHEUMATIC MITRAL (VALVE) PROLAPSE: ICD-10-CM

## 2019-12-10 DIAGNOSIS — E78.5 HYPERLIPIDEMIA, UNSPECIFIED: ICD-10-CM

## 2019-12-10 DIAGNOSIS — G43.909 MIGRAINE, UNSPECIFIED, NOT INTRACTABLE, WITHOUT STATUS MIGRAINOSUS: ICD-10-CM

## 2019-12-10 PROCEDURE — 99214 OFFICE O/P EST MOD 30 MIN: CPT

## 2019-12-10 RX ORDER — LEVOTHYROXINE SODIUM 137 UG/1
137 TABLET ORAL DAILY
Refills: 0 | Status: ACTIVE | COMMUNITY
Start: 2019-12-10

## 2019-12-10 NOTE — HISTORY OF PRESENT ILLNESS
[Obstructive Sleep Apnea] : obstructive sleep apnea [CPAP: ___ cmH2O] : CPAP: [unfilled] cmH2O [FreeTextEntry1] : 69 yr old female with recent hospitalization for PE and PVCs.  She has PMH of bronchial asthma and ETHAN with CPAP.\par \par She is going up stairs and walking around the house without SOB.  He denies SOB with walking but feels her heart rate go high.  She is currently on the Eliquis 5 mg BID, without  any bleeding.  Denies any breathing issue but has pain in her right arm post surgery.  She is compliant with the CPAP machine.  \par \par \par US of lower Ext 12/3/2019 \par \par Impression:  No evidence of DVT or superficial thrombophlebitis in the bilateral lower ext. \par \par CTA 12/1/2019 \par \par Right lower lung segmental PE, no evidence for right heart strain\par \par Health Maintenance: \par Mammogram 2/2019\par Colonoscopy 5 yrs ago \par endoscopy 5 years ago\par Got her flu vaccine this year

## 2019-12-10 NOTE — ASSESSMENT
[FreeTextEntry1] : PE\par \par Reviewed the CTA with small right lower lung segmental PE. No signs of right heart strain. Discussed with pt provoked PE due to recent surg.  She is to follow with 3 month of anticoagulation.  No need for follow up scans in 3 months.  No signs of bleeding or failure of therapy.  She is to follow with us in 3 months to discontinue therapy and discuss prophylaxis.  \par \par CTA report states diffuse emphysematous change.  After review of the images no significant empyema changes to the lung.  She is a never smoker and she is following with pulmonary on [place] and getting yearly PFT without any signs of OLD per pt.\par \par She follows with Cardiology at [facility] Dr. Ortiz and to follow regarding HR rate.  \par \par \par US of lower Ext 12/3/2019 \par \par Impression:  No evidence of DVT or superficial thrombophlebitis in the bilateral lower ext. \par \par CTA 12/1/2019 \par \par Right lower lung segmental PE, no evidence for right heart strain\par \par Health Maintenance: \par Mammogram 2/2019\par Colonoscopy 5 yrs ago \par endoscopy 5 years ago\par Got her flu vaccine this year\par \par \par ETHAN\par \par She is compliant with the CPAP and tolerating the CPAP mask.

## 2019-12-10 NOTE — PHYSICAL EXAM
[General Appearance - Well Developed] : well developed [Well Groomed] : well groomed [Normal Appearance] : normal appearance [No Deformities] : no deformities [General Appearance - Well Nourished] : well nourished [Eyelids - No Xanthelasma] : the eyelids demonstrated no xanthelasmas [Normal Conjunctiva] : the conjunctiva exhibited no abnormalities [General Appearance - In No Acute Distress] : no acute distress [Neck Cervical Mass (___cm)] : no neck mass was observed [Neck Appearance] : the appearance of the neck was normal [Normal Oropharynx] : normal oropharynx [Jugular Venous Distention Increased] : there was no jugular-venous distention [Heart Rate And Rhythm] : heart rate and rhythm were normal [Thyroid Diffuse Enlargement] : the thyroid was not enlarged [Thyroid Nodule] : there were no palpable thyroid nodules [Heart Sounds] : normal S1 and S2 [Murmurs] : no murmurs present [Auscultation Breath Sounds / Voice Sounds] : lungs were clear to auscultation bilaterally [Abdomen Soft] : soft [Respiration, Rhythm And Depth] : normal respiratory rhythm and effort [Exaggerated Use Of Accessory Muscles For Inspiration] : no accessory muscle use [Abdomen Tenderness] : non-tender [Abdomen Mass (___ Cm)] : no abdominal mass palpated [Abnormal Walk] : normal gait [Cyanosis, Localized] : no localized cyanosis [Gait - Sufficient For Exercise Testing] : the gait was sufficient for exercise testing [Nail Clubbing] : no clubbing of the fingernails [Skin Color & Pigmentation] : normal skin color and pigmentation [Petechial Hemorrhages (___cm)] : no petechial hemorrhages [] : no rash [No Venous Stasis] : no venous stasis [No Skin Ulcers] : no skin ulcer [Skin Lesions] : no skin lesions [No Xanthoma] : no  xanthoma was observed [Deep Tendon Reflexes (DTR)] : deep tendon reflexes were 2+ and symmetric [No Focal Deficits] : no focal deficits [Sensation] : the sensory exam was normal to light touch and pinprick [Oriented To Time, Place, And Person] : oriented to person, place, and time [Impaired Insight] : insight and judgment were intact [Affect] : the affect was normal

## 2019-12-18 ENCOUNTER — OUTPATIENT (OUTPATIENT)
Dept: OUTPATIENT SERVICES | Facility: HOSPITAL | Age: 69
LOS: 1 days | End: 2019-12-18
Payer: MEDICARE

## 2019-12-18 DIAGNOSIS — Z98.890 OTHER SPECIFIED POSTPROCEDURAL STATES: Chronic | ICD-10-CM

## 2019-12-18 DIAGNOSIS — Z98.891 HISTORY OF UTERINE SCAR FROM PREVIOUS SURGERY: Chronic | ICD-10-CM

## 2019-12-18 DIAGNOSIS — Z98.42 CATARACT EXTRACTION STATUS, LEFT EYE: Chronic | ICD-10-CM

## 2019-12-18 PROCEDURE — 73030 X-RAY EXAM OF SHOULDER: CPT | Mod: 26,RT

## 2019-12-31 ENCOUNTER — OUTPATIENT (OUTPATIENT)
Dept: OUTPATIENT SERVICES | Facility: HOSPITAL | Age: 69
LOS: 1 days | Discharge: HOME | End: 2019-12-31

## 2019-12-31 DIAGNOSIS — Z98.891 HISTORY OF UTERINE SCAR FROM PREVIOUS SURGERY: Chronic | ICD-10-CM

## 2019-12-31 DIAGNOSIS — Z98.890 OTHER SPECIFIED POSTPROCEDURAL STATES: Chronic | ICD-10-CM

## 2019-12-31 DIAGNOSIS — S42.201D UNSPECIFIED FRACTURE OF UPPER END OF RIGHT HUMERUS, SUBSEQUENT ENCOUNTER FOR FRACTURE WITH ROUTINE HEALING: ICD-10-CM

## 2019-12-31 DIAGNOSIS — Z98.42 CATARACT EXTRACTION STATUS, LEFT EYE: Chronic | ICD-10-CM

## 2020-01-15 ENCOUNTER — OUTPATIENT (OUTPATIENT)
Dept: OUTPATIENT SERVICES | Facility: HOSPITAL | Age: 70
LOS: 1 days | End: 2020-01-15

## 2020-01-15 ENCOUNTER — APPOINTMENT (OUTPATIENT)
Dept: RADIOLOGY | Facility: CLINIC | Age: 70
End: 2020-01-15
Payer: MEDICARE

## 2020-01-15 DIAGNOSIS — Z98.890 OTHER SPECIFIED POSTPROCEDURAL STATES: Chronic | ICD-10-CM

## 2020-01-15 DIAGNOSIS — Z98.42 CATARACT EXTRACTION STATUS, LEFT EYE: Chronic | ICD-10-CM

## 2020-01-15 DIAGNOSIS — Z98.891 HISTORY OF UTERINE SCAR FROM PREVIOUS SURGERY: Chronic | ICD-10-CM

## 2020-01-15 PROCEDURE — 73030 X-RAY EXAM OF SHOULDER: CPT | Mod: 26,RT

## 2020-01-21 ENCOUNTER — OUTPATIENT (OUTPATIENT)
Dept: OUTPATIENT SERVICES | Facility: HOSPITAL | Age: 70
LOS: 1 days | Discharge: HOME | End: 2020-01-21

## 2020-01-21 DIAGNOSIS — Z98.890 OTHER SPECIFIED POSTPROCEDURAL STATES: Chronic | ICD-10-CM

## 2020-01-21 DIAGNOSIS — S42.201D UNSPECIFIED FRACTURE OF UPPER END OF RIGHT HUMERUS, SUBSEQUENT ENCOUNTER FOR FRACTURE WITH ROUTINE HEALING: ICD-10-CM

## 2020-01-21 DIAGNOSIS — Z98.42 CATARACT EXTRACTION STATUS, LEFT EYE: Chronic | ICD-10-CM

## 2020-01-21 DIAGNOSIS — Z98.891 HISTORY OF UTERINE SCAR FROM PREVIOUS SURGERY: Chronic | ICD-10-CM

## 2020-01-22 ENCOUNTER — RX RENEWAL (OUTPATIENT)
Age: 70
End: 2020-01-22

## 2020-01-27 ENCOUNTER — EMERGENCY (EMERGENCY)
Facility: HOSPITAL | Age: 70
LOS: 0 days | Discharge: HOME | End: 2020-01-27
Attending: EMERGENCY MEDICINE | Admitting: EMERGENCY MEDICINE

## 2020-01-27 VITALS
TEMPERATURE: 97 F | DIASTOLIC BLOOD PRESSURE: 66 MMHG | SYSTOLIC BLOOD PRESSURE: 137 MMHG | OXYGEN SATURATION: 97 % | RESPIRATION RATE: 18 BRPM

## 2020-01-27 DIAGNOSIS — Z98.891 HISTORY OF UTERINE SCAR FROM PREVIOUS SURGERY: Chronic | ICD-10-CM

## 2020-01-27 DIAGNOSIS — Z88.0 ALLERGY STATUS TO PENICILLIN: ICD-10-CM

## 2020-01-27 DIAGNOSIS — Z91.040 LATEX ALLERGY STATUS: ICD-10-CM

## 2020-01-27 DIAGNOSIS — Z53.21 PROCEDURE AND TREATMENT NOT CARRIED OUT DUE TO PATIENT LEAVING PRIOR TO BEING SEEN BY HEALTH CARE PROVIDER: ICD-10-CM

## 2020-01-27 DIAGNOSIS — Z02.9 ENCOUNTER FOR ADMINISTRATIVE EXAMINATIONS, UNSPECIFIED: ICD-10-CM

## 2020-01-27 DIAGNOSIS — Z98.890 OTHER SPECIFIED POSTPROCEDURAL STATES: Chronic | ICD-10-CM

## 2020-01-27 DIAGNOSIS — Z88.8 ALLERGY STATUS TO OTHER DRUGS, MEDICAMENTS AND BIOLOGICAL SUBSTANCES STATUS: ICD-10-CM

## 2020-01-27 DIAGNOSIS — Z98.42 CATARACT EXTRACTION STATUS, LEFT EYE: Chronic | ICD-10-CM

## 2020-01-27 DIAGNOSIS — Z88.5 ALLERGY STATUS TO NARCOTIC AGENT: ICD-10-CM

## 2020-01-27 NOTE — ED ADULT TRIAGE NOTE - CHIEF COMPLAINT QUOTE
pt left without being triaged, vital signs taken by PCA then pt got up and left,  pt expressed to staff on way out of ER that she did not want to be seen anymore, because there are sick people in the hospital

## 2020-01-27 NOTE — ED ADULT NURSE NOTE - EXPLANATION OF PATIENT'S REASON FOR LEAVING
pt stated to staff members on way out of unit that there was too many sick people in the hospital and did not want to be exposed to anything

## 2020-02-27 ENCOUNTER — OUTPATIENT (OUTPATIENT)
Dept: OUTPATIENT SERVICES | Facility: HOSPITAL | Age: 70
LOS: 1 days | Discharge: HOME | End: 2020-02-27
Payer: MEDICARE

## 2020-02-27 DIAGNOSIS — Z12.31 ENCOUNTER FOR SCREENING MAMMOGRAM FOR MALIGNANT NEOPLASM OF BREAST: ICD-10-CM

## 2020-02-27 DIAGNOSIS — Z98.890 OTHER SPECIFIED POSTPROCEDURAL STATES: Chronic | ICD-10-CM

## 2020-02-27 DIAGNOSIS — Z98.891 HISTORY OF UTERINE SCAR FROM PREVIOUS SURGERY: Chronic | ICD-10-CM

## 2020-02-27 DIAGNOSIS — Z98.42 CATARACT EXTRACTION STATUS, LEFT EYE: Chronic | ICD-10-CM

## 2020-02-27 PROCEDURE — 77063 BREAST TOMOSYNTHESIS BI: CPT | Mod: 26

## 2020-02-27 PROCEDURE — 77067 SCR MAMMO BI INCL CAD: CPT | Mod: 26

## 2020-03-04 ENCOUNTER — OUTPATIENT (OUTPATIENT)
Dept: OUTPATIENT SERVICES | Facility: HOSPITAL | Age: 70
LOS: 1 days | End: 2020-03-04
Payer: MEDICARE

## 2020-03-04 ENCOUNTER — APPOINTMENT (OUTPATIENT)
Dept: RADIOLOGY | Facility: CLINIC | Age: 70
End: 2020-03-04

## 2020-03-04 DIAGNOSIS — Z98.891 HISTORY OF UTERINE SCAR FROM PREVIOUS SURGERY: Chronic | ICD-10-CM

## 2020-03-04 DIAGNOSIS — Z98.42 CATARACT EXTRACTION STATUS, LEFT EYE: Chronic | ICD-10-CM

## 2020-03-04 DIAGNOSIS — Z98.890 OTHER SPECIFIED POSTPROCEDURAL STATES: Chronic | ICD-10-CM

## 2020-03-04 PROCEDURE — 73030 X-RAY EXAM OF SHOULDER: CPT | Mod: 26,RT

## 2020-03-24 ENCOUNTER — APPOINTMENT (OUTPATIENT)
Dept: PULMONOLOGY | Facility: CLINIC | Age: 70
End: 2020-03-24

## 2020-03-27 ENCOUNTER — NON-APPOINTMENT (OUTPATIENT)
Age: 70
End: 2020-03-27

## 2020-07-15 ENCOUNTER — APPOINTMENT (OUTPATIENT)
Dept: RADIOLOGY | Facility: CLINIC | Age: 70
End: 2020-07-15

## 2020-07-15 ENCOUNTER — OUTPATIENT (OUTPATIENT)
Dept: OUTPATIENT SERVICES | Facility: HOSPITAL | Age: 70
LOS: 1 days | End: 2020-07-15
Payer: MEDICARE

## 2020-07-15 DIAGNOSIS — Z98.890 OTHER SPECIFIED POSTPROCEDURAL STATES: Chronic | ICD-10-CM

## 2020-07-15 DIAGNOSIS — Z98.891 HISTORY OF UTERINE SCAR FROM PREVIOUS SURGERY: Chronic | ICD-10-CM

## 2020-07-15 DIAGNOSIS — Z98.42 CATARACT EXTRACTION STATUS, LEFT EYE: Chronic | ICD-10-CM

## 2020-07-15 PROCEDURE — 73030 X-RAY EXAM OF SHOULDER: CPT | Mod: 26,RT

## 2020-07-27 ENCOUNTER — RX RENEWAL (OUTPATIENT)
Age: 70
End: 2020-07-27

## 2020-08-01 ENCOUNTER — EMERGENCY (EMERGENCY)
Facility: HOSPITAL | Age: 70
LOS: 0 days | Discharge: HOME | End: 2020-08-01
Attending: EMERGENCY MEDICINE | Admitting: EMERGENCY MEDICINE
Payer: MEDICARE

## 2020-08-01 VITALS
RESPIRATION RATE: 18 BRPM | TEMPERATURE: 97 F | OXYGEN SATURATION: 96 % | WEIGHT: 179.9 LBS | DIASTOLIC BLOOD PRESSURE: 65 MMHG | HEART RATE: 70 BPM | SYSTOLIC BLOOD PRESSURE: 139 MMHG

## 2020-08-01 VITALS
TEMPERATURE: 97 F | OXYGEN SATURATION: 96 % | RESPIRATION RATE: 19 BRPM | HEART RATE: 74 BPM | DIASTOLIC BLOOD PRESSURE: 68 MMHG | SYSTOLIC BLOOD PRESSURE: 138 MMHG

## 2020-08-01 DIAGNOSIS — Z98.890 OTHER SPECIFIED POSTPROCEDURAL STATES: Chronic | ICD-10-CM

## 2020-08-01 DIAGNOSIS — M25.512 PAIN IN LEFT SHOULDER: ICD-10-CM

## 2020-08-01 DIAGNOSIS — R07.9 CHEST PAIN, UNSPECIFIED: ICD-10-CM

## 2020-08-01 DIAGNOSIS — Z88.0 ALLERGY STATUS TO PENICILLIN: ICD-10-CM

## 2020-08-01 DIAGNOSIS — Z98.42 CATARACT EXTRACTION STATUS, LEFT EYE: Chronic | ICD-10-CM

## 2020-08-01 DIAGNOSIS — Z88.8 ALLERGY STATUS TO OTHER DRUGS, MEDICAMENTS AND BIOLOGICAL SUBSTANCES STATUS: ICD-10-CM

## 2020-08-01 DIAGNOSIS — R19.7 DIARRHEA, UNSPECIFIED: ICD-10-CM

## 2020-08-01 DIAGNOSIS — Z87.891 PERSONAL HISTORY OF NICOTINE DEPENDENCE: ICD-10-CM

## 2020-08-01 DIAGNOSIS — Z88.5 ALLERGY STATUS TO NARCOTIC AGENT: ICD-10-CM

## 2020-08-01 DIAGNOSIS — Z91.040 LATEX ALLERGY STATUS: ICD-10-CM

## 2020-08-01 DIAGNOSIS — R11.0 NAUSEA: ICD-10-CM

## 2020-08-01 DIAGNOSIS — Z98.891 HISTORY OF UTERINE SCAR FROM PREVIOUS SURGERY: Chronic | ICD-10-CM

## 2020-08-01 LAB
ALBUMIN SERPL ELPH-MCNC: 4.1 G/DL — SIGNIFICANT CHANGE UP (ref 3.5–5.2)
ALP SERPL-CCNC: 109 U/L — SIGNIFICANT CHANGE UP (ref 30–115)
ALT FLD-CCNC: 17 U/L — SIGNIFICANT CHANGE UP (ref 0–41)
ANION GAP SERPL CALC-SCNC: 10 MMOL/L — SIGNIFICANT CHANGE UP (ref 7–14)
AST SERPL-CCNC: 18 U/L — SIGNIFICANT CHANGE UP (ref 0–41)
BASOPHILS # BLD AUTO: 0.03 K/UL — SIGNIFICANT CHANGE UP (ref 0–0.2)
BASOPHILS NFR BLD AUTO: 0.4 % — SIGNIFICANT CHANGE UP (ref 0–1)
BILIRUB SERPL-MCNC: 0.2 MG/DL — SIGNIFICANT CHANGE UP (ref 0.2–1.2)
BUN SERPL-MCNC: 14 MG/DL — SIGNIFICANT CHANGE UP (ref 10–20)
CALCIUM SERPL-MCNC: 9.6 MG/DL — SIGNIFICANT CHANGE UP (ref 8.5–10.1)
CHLORIDE SERPL-SCNC: 96 MMOL/L — LOW (ref 98–110)
CO2 SERPL-SCNC: 30 MMOL/L — SIGNIFICANT CHANGE UP (ref 17–32)
CREAT SERPL-MCNC: 0.8 MG/DL — SIGNIFICANT CHANGE UP (ref 0.7–1.5)
EOSINOPHIL # BLD AUTO: 0.14 K/UL — SIGNIFICANT CHANGE UP (ref 0–0.7)
EOSINOPHIL NFR BLD AUTO: 2 % — SIGNIFICANT CHANGE UP (ref 0–8)
GLUCOSE SERPL-MCNC: 99 MG/DL — SIGNIFICANT CHANGE UP (ref 70–99)
HCT VFR BLD CALC: 38.6 % — SIGNIFICANT CHANGE UP (ref 37–47)
HGB BLD-MCNC: 13.1 G/DL — SIGNIFICANT CHANGE UP (ref 12–16)
IMM GRANULOCYTES NFR BLD AUTO: 0.1 % — SIGNIFICANT CHANGE UP (ref 0.1–0.3)
LACTATE SERPL-SCNC: 1.1 MMOL/L — SIGNIFICANT CHANGE UP (ref 0.7–2)
LIDOCAIN IGE QN: 26 U/L — SIGNIFICANT CHANGE UP (ref 7–60)
LYMPHOCYTES # BLD AUTO: 2.34 K/UL — SIGNIFICANT CHANGE UP (ref 1.2–3.4)
LYMPHOCYTES # BLD AUTO: 33.9 % — SIGNIFICANT CHANGE UP (ref 20.5–51.1)
MCHC RBC-ENTMCNC: 30.1 PG — SIGNIFICANT CHANGE UP (ref 27–31)
MCHC RBC-ENTMCNC: 33.9 G/DL — SIGNIFICANT CHANGE UP (ref 32–37)
MCV RBC AUTO: 88.7 FL — SIGNIFICANT CHANGE UP (ref 81–99)
MONOCYTES # BLD AUTO: 0.94 K/UL — HIGH (ref 0.1–0.6)
MONOCYTES NFR BLD AUTO: 13.6 % — HIGH (ref 1.7–9.3)
NEUTROPHILS # BLD AUTO: 3.45 K/UL — SIGNIFICANT CHANGE UP (ref 1.4–6.5)
NEUTROPHILS NFR BLD AUTO: 50 % — SIGNIFICANT CHANGE UP (ref 42.2–75.2)
NRBC # BLD: 0 /100 WBCS — SIGNIFICANT CHANGE UP (ref 0–0)
PLATELET # BLD AUTO: 223 K/UL — SIGNIFICANT CHANGE UP (ref 130–400)
POTASSIUM SERPL-MCNC: 3.5 MMOL/L — SIGNIFICANT CHANGE UP (ref 3.5–5)
POTASSIUM SERPL-SCNC: 3.5 MMOL/L — SIGNIFICANT CHANGE UP (ref 3.5–5)
PROT SERPL-MCNC: 7 G/DL — SIGNIFICANT CHANGE UP (ref 6–8)
RBC # BLD: 4.35 M/UL — SIGNIFICANT CHANGE UP (ref 4.2–5.4)
RBC # FLD: 13.9 % — SIGNIFICANT CHANGE UP (ref 11.5–14.5)
SODIUM SERPL-SCNC: 136 MMOL/L — SIGNIFICANT CHANGE UP (ref 135–146)
TROPONIN T SERPL-MCNC: <0.01 NG/ML — SIGNIFICANT CHANGE UP
WBC # BLD: 6.91 K/UL — SIGNIFICANT CHANGE UP (ref 4.8–10.8)
WBC # FLD AUTO: 6.91 K/UL — SIGNIFICANT CHANGE UP (ref 4.8–10.8)

## 2020-08-01 PROCEDURE — 99285 EMERGENCY DEPT VISIT HI MDM: CPT | Mod: GC

## 2020-08-01 PROCEDURE — 71046 X-RAY EXAM CHEST 2 VIEWS: CPT | Mod: 26

## 2020-08-01 NOTE — ED PROVIDER NOTE - NS ED ROS FT
Constitutional:  see HPI  Head:  no headache, dizziness, loss of consciousness  Eyes:  no visual changes; no eye pain, redness, or discharge  ENMT:  no ear pain or discharge; no hearing problems; no mouth or throat sores or lesions; no throat pain  Cardiac: scapular pain  Respiratory: no cough, wheezing, shortness of breath, chest tightness, or trouble breathing  GI: nausea, diarrhea  :  no dysuria, frequency, or burning with urination; no change in urine output  MS: no myalgias, muscle weakness, joint pain,or  injury; no joint swelling  Neuro: no weakness; no numbness or tingling; no seizure  Skin:  no rashes or color changes; no lacerations or abrasions

## 2020-08-01 NOTE — ED PROVIDER NOTE - ATTENDING CONTRIBUTION TO CARE
70 y F to ED with CP   substernal radiating to arm, no fever or cough, no trauma, no N/V, no back pain  no recent sick contacts, no travel  AVSS, exam as noted, CTAB, RRR, abdomen soft NTND, (+) bowel sounds, neuro nonfocal

## 2020-08-01 NOTE — ED PROVIDER NOTE - PATIENT PORTAL LINK FT
You can access the FollowMyHealth Patient Portal offered by [facility] by registering at the following website: [email or web address]. By joining [employer]’s FollowMyHealth portal, you will also be able to view your health information using other applications (apps) compatible with our system.

## 2020-08-01 NOTE — ED PROVIDER NOTE - OBJECTIVE STATEMENT
69 yo female with hx of wilma, copd, hypothyroidism, htn, PE on eliquis presenting with L scapular pain associated with nausea and diarrhea starting 2 hours PTA. denies sob, cp, abd pain, fever, cough.

## 2020-08-01 NOTE — ED PROVIDER NOTE - CLINICAL SUMMARY MEDICAL DECISION MAKING FREE TEXT BOX
ED workup neg, recommend admission  I had extensive discussion of Risks/Alternatives/Benefits of pursuing further medical evaluation and/or care with patient and any available family/friends; patient still electing to leave against medical advice. Patient is awake, alert, oriented and demonstrates full capacity and insight into illness. Patient aware and encouraged to return immediately to ED or nearest ED if patient decides to change mind regarding care or if patient experiences any new, worsening, or concerning symptoms.

## 2020-08-02 ENCOUNTER — EMERGENCY (EMERGENCY)
Facility: HOSPITAL | Age: 70
LOS: 0 days | Discharge: HOME | End: 2020-08-02

## 2020-08-02 VITALS
DIASTOLIC BLOOD PRESSURE: 74 MMHG | SYSTOLIC BLOOD PRESSURE: 156 MMHG | OXYGEN SATURATION: 98 % | WEIGHT: 160.06 LBS | RESPIRATION RATE: 18 BRPM | HEART RATE: 79 BPM | HEIGHT: 68 IN | TEMPERATURE: 96 F

## 2020-08-02 DIAGNOSIS — Z98.890 OTHER SPECIFIED POSTPROCEDURAL STATES: Chronic | ICD-10-CM

## 2020-08-02 DIAGNOSIS — Z98.891 HISTORY OF UTERINE SCAR FROM PREVIOUS SURGERY: Chronic | ICD-10-CM

## 2020-08-02 DIAGNOSIS — Z98.42 CATARACT EXTRACTION STATUS, LEFT EYE: Chronic | ICD-10-CM

## 2020-09-20 ENCOUNTER — EMERGENCY (EMERGENCY)
Facility: HOSPITAL | Age: 70
LOS: 0 days | Discharge: HOME | End: 2020-09-20
Attending: EMERGENCY MEDICINE | Admitting: EMERGENCY MEDICINE
Payer: MEDICARE

## 2020-09-20 VITALS
DIASTOLIC BLOOD PRESSURE: 73 MMHG | OXYGEN SATURATION: 100 % | HEART RATE: 80 BPM | SYSTOLIC BLOOD PRESSURE: 150 MMHG | RESPIRATION RATE: 20 BRPM

## 2020-09-20 VITALS
WEIGHT: 199.96 LBS | OXYGEN SATURATION: 100 % | HEART RATE: 75 BPM | SYSTOLIC BLOOD PRESSURE: 155 MMHG | RESPIRATION RATE: 20 BRPM | TEMPERATURE: 98 F | DIASTOLIC BLOOD PRESSURE: 74 MMHG | HEIGHT: 68 IN

## 2020-09-20 DIAGNOSIS — Z98.890 OTHER SPECIFIED POSTPROCEDURAL STATES: Chronic | ICD-10-CM

## 2020-09-20 DIAGNOSIS — J02.9 ACUTE PHARYNGITIS, UNSPECIFIED: ICD-10-CM

## 2020-09-20 DIAGNOSIS — Z88.1 ALLERGY STATUS TO OTHER ANTIBIOTIC AGENTS STATUS: ICD-10-CM

## 2020-09-20 DIAGNOSIS — Z98.891 HISTORY OF UTERINE SCAR FROM PREVIOUS SURGERY: Chronic | ICD-10-CM

## 2020-09-20 DIAGNOSIS — Z88.0 ALLERGY STATUS TO PENICILLIN: ICD-10-CM

## 2020-09-20 DIAGNOSIS — Z98.42 CATARACT EXTRACTION STATUS, LEFT EYE: Chronic | ICD-10-CM

## 2020-09-20 DIAGNOSIS — Z88.5 ALLERGY STATUS TO NARCOTIC AGENT: ICD-10-CM

## 2020-09-20 DIAGNOSIS — Z91.040 LATEX ALLERGY STATUS: ICD-10-CM

## 2020-09-20 DIAGNOSIS — R07.0 PAIN IN THROAT: ICD-10-CM

## 2020-09-20 PROCEDURE — 99283 EMERGENCY DEPT VISIT LOW MDM: CPT

## 2020-09-20 RX ORDER — NYSTATIN 500MM UNIT
4 POWDER (EA) MISCELLANEOUS
Qty: 112 | Refills: 0
Start: 2020-09-20 | End: 2020-09-26

## 2020-09-20 RX ORDER — AZITHROMYCIN 500 MG/1
1 TABLET, FILM COATED ORAL
Qty: 6 | Refills: 0
Start: 2020-09-20 | End: 2020-09-24

## 2020-09-20 RX ORDER — NYSTATIN 500MM UNIT
4 POWDER (EA) MISCELLANEOUS
Qty: 56 | Refills: 0
Start: 2020-09-20 | End: 2020-09-26

## 2020-09-20 NOTE — ED PROVIDER NOTE - PATIENT PORTAL LINK FT
You can access the FollowMyHealth Patient Portal offered by [facility] by registering at the following website: [email or web address]. By joining [employer]’s FollowMyHealth portal, you will also be able to view your health information using other applications (apps) compatible with our system.

## 2020-09-20 NOTE — ED PROVIDER NOTE - ATTENDING CONTRIBUTION TO CARE
71 yo F PMHx noted presents with c/o sore throat and irritation since being intubated for a surgical procedure this past week.  Pain is worse with swallowing. no fever or chills.  On exam pt in nad aa ox 3, speech is clear, Op with erythema and irritation, + ulcer on palate, no lad,

## 2020-09-20 NOTE — ED PROVIDER NOTE - OBJECTIVE STATEMENT
71 y/o female presents c/o sore throat and irritation s/p surgical procedure under general anesthesia last week. patient denies any difficulty swallowing or muffled voice. patient denies any sob or cp. no swelling to throat. patient c/o sore throat worse with swallowing. no tongue elevation or swelling. no fever,chills.

## 2020-09-20 NOTE — ED ADULT TRIAGE NOTE - CHIEF COMPLAINT QUOTE
pt complaining of pain and difficulty swallowing after having general anesthesia and being intubated on september 16th, denies fever

## 2020-09-20 NOTE — ED PROVIDER NOTE - NS ED ROS FT
ROS:     constitutional: no fever, weight loss  msk: no joint pain or difficulty ROM  skin: no laceration or swelling or bruising  neuro: no tingling , weakness , difficulty ambulation  ENT: sore throat

## 2020-09-20 NOTE — ED PROVIDER NOTE - PHYSICAL EXAMINATION
ENT: +ulceration with surrounding erythema noted to soft pallate. +no edema or oropharynx. no stridor  no tongue elevation

## 2020-10-15 NOTE — ED ADULT TRIAGE NOTE - HEART RATE (BEATS/MIN)
Patient had a RIGHT L4-5 & L5-S1 transforaminal epidural steroid injection on 10/7/20.  Called patient for an update.      Pt reported the following details:  Pain intensified after the injection. It's starting to come back down but is still worse than prior to injection.   Told patient that the information will be forwarded to her provider.  Also explained that, if a steroid medication was used, it could take up to 14 days to feel the full effect and if pt has any further questions or concerns pt should call the clinic at [phone].     75

## 2020-10-20 ENCOUNTER — RX RENEWAL (OUTPATIENT)
Age: 70
End: 2020-10-20

## 2021-01-11 NOTE — DISCHARGE NOTE PROVIDER - REASON FOR ADMISSION
OUTPATIENT CARDIOLOGY FOLLOW-UP    Name: Maria Dolores Tariq    Age: 77 y.o. Primary Care Physician: Omari Mar DO    Date of Service: 1/11/2021    Chief Complaint:   Chief Complaint   Patient presents with    Coronary Artery Disease    Hypertension    Follow-up        Interim History:   71-year-old male who has history of coronary disease status post multiple PCI's over the years to the RCA and mid LAD in 1998, 2002 angioplasty alone to the LAD into the diagonal, diagonal drug-eluting stent 2005, and most recent with NSTEMI in 2012 with drug-eluting stent to the mid LAD. He also has hypertension hyperlipidemia. Presents today for routine annual follow-up. 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 Back in November he was teaching [employer] student to [address] [place] [address], when he became dizzy lightheaded, blood pressure was low and he was taken to the ER. He was found to have an KAYKAY with a creatinine of 1.5, and orthostatic hypotension. He was given 2 L of IV saline and improved. He was feeling crummy for a couple weeks after that. A1c recently found to be up to 8.5%, his diabetes was previously diet controlled. He was put on Metformin initially by Dr. Leesa Rodgers though he did not tolerate it well, and was recently switched to empagliflozin. Since coming off Metformin he also feels much better. He is on multiple antihypertensives, including a losartan-hydrochlorothiazide combination tablet, which is 100-25 mg, and he takes 2 of them daily. He is also on amlodipine, metoprolol, clonidine. He is taking more effort to stay well-hydrated. Denies any prior episode of syncope. Review of Systems:   Negative except as described above    Past Medical History:  Past Medical History:   Diagnosis Date    Anemia     Anxiety attack     controlled with citolpram    CAD (coronary artery disease) 16 YRS AGO    MI X 2.  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 Jade Sanchez, Stent Mid LAD    DIAGNOSTIC CARDIAC CATH LAB PROCEDURE  9/17/1998    [facility]. Cath/PTCA/stent of RCA; PTCA/stent of mid LAD with adjunctive ReoPro administration.  DIAGNOSTIC CARDIAC CATH LAB PROCEDURE  6/25/2002    [facility]. Kissing balloon angioplasty LAD>diag Perclose. Dr. Jade Sanchez and Dr. Mirela Lemon.  DIAGNOSTIC CARDIAC CATH LAB PROCEDURE  4/13/2005    [facility]. Cath/stent (TAX\"US) to diagonal at [facility].  ENDOSCOPY, COLON, DIAGNOSTIC      FINGER TRIGGER RELEASE Right 11/20/2015    Release right long trigger finger. Abdelrahman Saldana MD    HERNIA REPAIR Left YRS AGO    [facility]    HIATAL HERNIA REPAIR N/A 10/22/2019    LAPAROSCOPIC ROBOTIC XI ASSISTED PARAESOPHAGEAL HERNIA REPAIR WITH PARTIAL FUNDOPLICATION AND MYOFASCIAL FLAP, UPPER ENDOSCOPY performed by Greg Ceja MD at [address] Right 08/01/2017    robotic assisted    JOINT REPLACEMENT Left 2014    knee    KNEE ARTHROPLASTY Left 06/16/2014    Left TKA.   Abdelrahman Saldana MD    TONSILLECTOMY      UPPER GASTROINTESTINAL ENDOSCOPY      UPPER GASTROINTESTINAL ENDOSCOPY N/A 9/10/2019    EGD BIOPSY performed by Greg Ceja MD at [place] [address] Family History:  Family History   Problem Relation Age of Onset    Hypertension Mother [de-identified]         from renal failure    Diabetes Mother     Coronary Art Dis Mother     Osteoporosis Mother     Osteoarthritis Father 80    High Cholesterol Brother     High Cholesterol Brother        Social History:  Social History     Tobacco Use    Smoking status: Never Smoker    Smokeless tobacco: Never Used   Substance Use Topics    Alcohol use: Yes     Comment: socially    Drug use: No        Allergies: Allergies   Allergen Reactions    Penicillins Hives       Current Medications:    Current Outpatient Medications:     empagliflozin (JARDIANCE) 10 MG tablet, Take 1 tablet by mouth daily, Disp: 30 tablet, Rfl: 0    losartan-hydroCHLOROthiazide (HYZAAR) 100-25 MG per tablet, Take 2 tablets by mouth daily, Disp: 180 tablet, Rfl: 1    atorvastatin (LIPITOR) 80 MG tablet, Take 1 tablet by mouth nightly, Disp: 90 tablet, Rfl: 1    citalopram (CELEXA) 40 MG tablet, Take 1.5 tablets by mouth every morning, Disp: 135 tablet, Rfl: 1    cloNIDine (CATAPRES) 0.3 MG tablet, Take 1 tablet by mouth nightly, Disp: 90 tablet, Rfl: 1    ezetimibe (ZETIA) 10 MG tablet, Take 1 tablet by mouth daily, Disp: 90 tablet, Rfl: 1    metoprolol tartrate (LOPRESSOR) 25 MG tablet, Take 1 tablet by mouth 2 times daily, Disp: 180 tablet, Rfl: 1    amLODIPine (NORVASC) 10 MG tablet, Take 1 tablet by mouth daily, Disp: 90 tablet, Rfl: 1    omeprazole (PRILOSEC) 40 MG delayed release capsule, Take 1 capsule by mouth 2 times daily, Disp: 180 capsule, Rfl: 1    nitroGLYCERIN (NITROSTAT) 0.4 MG SL tablet, Place 1 tablet under the tongue every 5 minutes as needed for Chest pain If chest pain: put 1 NTG tab under tongue - sit down - wait 5 min - if no relief, call 911. May repeat dose 2 more times 5 min apart while waiting for EMS (total of 3 doses). If dizzy do not take any further doses. , Disp: 90 tablet, Rfl: 1    fluticasone right humeral head/neck fracture (FLONASE) 50 MCG/ACT nasal spray, 2 sprays by Each Nostril route daily, Disp: 1 Bottle, Rfl: 0    cholestyramine (QUESTRAN) 4 g packet, Take 1 packet by mouth 2 times daily, Disp: 90 packet, Rfl: 3    ibuprofen (IBU) 800 MG tablet, Take 1 tablet by mouth every 6 hours as needed for Pain, Disp: 90 tablet, Rfl: 1    aspirin 81 MG tablet, Take 81 mg by mouth daily Last dose 10/19/19, Disp: , Rfl:     ferrous sulfate 325 (65 Fe) MG tablet, Take 325 mg by mouth daily (with breakfast) LD 7/15/19, Disp: , Rfl:     Cholecalciferol (VITAMIN D3) 5000 UNITS TABS, Take 1 tablet by mouth every morning , Disp: , Rfl:     Ascorbic Acid (VITAMIN C) 500 MG tablet, Take 500 mg by mouth every morning , Disp: , Rfl:     fish oil-omega-3 fatty acids 1000 MG capsule, Take 1 g by mouth every morning , Disp: , Rfl:     metFORMIN (GLUCOPHAGE) 500 MG tablet, Take 1 tablet by mouth 2 times daily (with meals) (Patient not taking: Reported on 1/11/2021), Disp: 60 tablet, Rfl: 0    Physical Exam:  /74   Pulse 57   Resp 16   Ht 5' 9\" (1.753 m)   Wt 210 lb 3.2 oz (95.3 kg)   BMI 31.04 kg/m²   Wt Readings from Last 3 Encounters:   01/11/21 210 lb 3.2 oz (95.3 kg)   01/06/21 212 lb (96.2 kg)   12/02/20 217 lb (98.4 kg)     Appearance: Awake, alert and oriented x 3, no acute respiratory distress  Skin: Intact, no rash  Head: Normocephalic, atraumatic  Eyes: EOMI, no conjunctival erythema  ENMT: No pharyngeal erythema, MMM, no rhinorrhea  Neck: Supple, no elevated JVP, no carotid bruits  Lungs: Clear to auscultation bilaterally. No wheezes, rales, or rhonchi.   Cardiac: Regular rate and rhythm, +S1S2, no murmurs apparent  Abdomen: Soft, nontender, +bowel sounds  Extremities: Moves all extremities x 4, no lower extremity edema  Neurologic: No focal motor deficits apparent, normal mood and affect, alert and oriented x 3  Peripheral Pulses: Intact posterior tibial pulses bilaterally    Laboratory Tests:  Lab Results   Component Value Date    CREATININE 1.2 01/06/2021    BUN 26 (H) 01/06/2021     01/06/2021    K 3.9 01/06/2021    CL 98 01/06/2021    CO2 25 01/06/2021     Lab Results   Component Value Date    MG 1.5 10/23/2019     Lab Results   Component Value Date    WBC 11.9 (H) 01/06/2021    HGB 15.2 01/06/2021    HCT 43.4 01/06/2021    MCV 85.4 01/06/2021     01/06/2021     Lab Results   Component Value Date    ALT 36 01/06/2021    AST 23 01/06/2021    ALKPHOS 99 01/06/2021    BILITOT 1.2 01/06/2021     Lab Results   Component Value Date    CKTOTAL 138 05/27/2012    CKMB 5.5 (H) 05/27/2012    TROPONINI <0.01 11/20/2020    TROPONINI <0.01 10/23/2019    TROPONINI <0.01 07/17/2019     Lab Results   Component Value Date    INR 1.0 03/04/2018    INR 1.3 05/29/2012    INR 1.2 05/27/2012    PROTIME 11.3 03/04/2018    PROTIME 12.4 05/29/2012    PROTIME 11.3 05/27/2012     Lab Results   Component Value Date    TSH 0.986 04/07/2019     Lab Results   Component Value Date    LABA1C 8.5 (H) 12/02/2020     No results found for: EAG  Lab Results   Component Value Date    CHOL 140 11/05/2019    CHOL 139 05/01/2019    CHOL 131 04/07/2019     Lab Results   Component Value Date    TRIG 182 (H) 11/05/2019    TRIG 190 (H) 05/01/2019    TRIG 190 (H) 04/07/2019     Lab Results   Component Value Date    HDL 31 11/05/2019    HDL 32 05/01/2019    HDL 26 04/07/2019     Lab Results   Component Value Date    LDLCALC 73 11/05/2019    LDLCALC 69 05/01/2019    LDLCALC 67 04/07/2019     Lab Results   Component Value Date    LABVLDL 36 11/05/2019    LABVLDL 38 04/07/2019    LABVLDL 35 09/13/2018     No results found for: CHOLHDLRATIO  No results for input(s): PROBNP in the last 72 hours. Cardiac Tests:  ECG: Sinus bradycardia 57 bpm, normal axis. Nonspecific interventricular conduction delay, QRS duration 110 ms. Nonspecific anterior T wave changes.     Echocardiogram 04/02/2018 (Dr. Loretta Recinos): Left ventricular internal dimensions were normal in diastole and kissing balloon angioplasty to LAD>Diagonal (specifics unknown)  Cardiac catheterization with stenting to the Diagonal 04/13/2005 (specifics unknown)    Orders Placed This Encounter   Procedures    EKG 12 lead        Requested Prescriptions      No prescriptions requested or ordered in this encounter        ASSESSMENT / PLAN:  1. Coronary artery disease as above. Prior PCI to RCA, diagonal, LAD as above. Stable  2. Hypertension, well controlled on multidrug regimen. Recent orthostatic hypotension/presyncopal episode. 3. Hyperlipidemia  4. Type II diabetes, A1c 8.5%. Previously diet controlled. Now on empagliflozin, intolerant to Metformin. 5. Comorbid disease: Cholelithiasis status post cholecystectomy 2019, GERD/hiatal hernia status post paraesophageal repair 2019, left TKA, anxiety    Recommendations:  I agree that he is probably on too much blood pressure medication which likely contributed to his orthostatic episode and recent KAYKAY. · He is currently taking 2 tablets of losartan HCTZ 100-25 mg daily, which is higher than the recommended daily dose; I recommend reducing this to 1 tablet daily  · I agree with Dr. Chiki Nayak and would like to taper him off of clonidine; reduce to half tab of the 0.3 mg daily, and eventual discontinuation  · Continue aspirin/statin indefinitely  · Continue metoprolol and amlodipine  · Maintain adequate hydration  · Aggressive risk factor modification including continued efforts for weight loss and increased exercise, and follow a heart healthy Mediterranean type diet  · Follow-up in 6 months or sooner if need arises    The patient's current medication list, allergies, problem list and results of all previously ordered testing were reviewed at today's visit.     Carlota Cuello MD   [facility]) Cardiology

## 2021-03-03 ENCOUNTER — OUTPATIENT (OUTPATIENT)
Dept: OUTPATIENT SERVICES | Facility: HOSPITAL | Age: 71
LOS: 1 days | Discharge: HOME | End: 2021-03-03
Payer: MEDICARE

## 2021-03-03 DIAGNOSIS — Z98.890 OTHER SPECIFIED POSTPROCEDURAL STATES: Chronic | ICD-10-CM

## 2021-03-03 DIAGNOSIS — Z12.31 ENCOUNTER FOR SCREENING MAMMOGRAM FOR MALIGNANT NEOPLASM OF BREAST: ICD-10-CM

## 2021-03-03 DIAGNOSIS — Z98.42 CATARACT EXTRACTION STATUS, LEFT EYE: Chronic | ICD-10-CM

## 2021-03-03 DIAGNOSIS — Z98.891 HISTORY OF UTERINE SCAR FROM PREVIOUS SURGERY: Chronic | ICD-10-CM

## 2021-03-03 PROCEDURE — 77067 SCR MAMMO BI INCL CAD: CPT | Mod: 26

## 2021-03-03 PROCEDURE — 77063 BREAST TOMOSYNTHESIS BI: CPT | Mod: 26

## 2021-04-02 ENCOUNTER — EMERGENCY (EMERGENCY)
Facility: HOSPITAL | Age: 71
LOS: 0 days | Discharge: HOME | End: 2021-04-02
Attending: STUDENT IN AN ORGANIZED HEALTH CARE EDUCATION/TRAINING PROGRAM
Payer: MEDICARE

## 2021-04-02 VITALS
HEART RATE: 93 BPM | DIASTOLIC BLOOD PRESSURE: 72 MMHG | TEMPERATURE: 99 F | HEIGHT: 68 IN | OXYGEN SATURATION: 99 % | WEIGHT: 199.96 LBS | SYSTOLIC BLOOD PRESSURE: 137 MMHG | RESPIRATION RATE: 20 BRPM

## 2021-04-02 VITALS
HEART RATE: 77 BPM | RESPIRATION RATE: 18 BRPM | SYSTOLIC BLOOD PRESSURE: 127 MMHG | OXYGEN SATURATION: 100 % | TEMPERATURE: 98 F | DIASTOLIC BLOOD PRESSURE: 77 MMHG

## 2021-04-02 DIAGNOSIS — Z88.0 ALLERGY STATUS TO PENICILLIN: ICD-10-CM

## 2021-04-02 DIAGNOSIS — Z88.5 ALLERGY STATUS TO NARCOTIC AGENT: ICD-10-CM

## 2021-04-02 DIAGNOSIS — Z00.00 ENCOUNTER FOR GENERAL ADULT MEDICAL EXAMINATION WITHOUT ABNORMAL FINDINGS: ICD-10-CM

## 2021-04-02 DIAGNOSIS — Z98.42 CATARACT EXTRACTION STATUS, LEFT EYE: Chronic | ICD-10-CM

## 2021-04-02 DIAGNOSIS — Z91.040 LATEX ALLERGY STATUS: ICD-10-CM

## 2021-04-02 DIAGNOSIS — Z88.8 ALLERGY STATUS TO OTHER DRUGS, MEDICAMENTS AND BIOLOGICAL SUBSTANCES STATUS: ICD-10-CM

## 2021-04-02 DIAGNOSIS — Z79.899 OTHER LONG TERM (CURRENT) DRUG THERAPY: ICD-10-CM

## 2021-04-02 DIAGNOSIS — Z98.890 OTHER SPECIFIED POSTPROCEDURAL STATES: Chronic | ICD-10-CM

## 2021-04-02 DIAGNOSIS — R52 PAIN, UNSPECIFIED: ICD-10-CM

## 2021-04-02 DIAGNOSIS — R00.0 TACHYCARDIA, UNSPECIFIED: ICD-10-CM

## 2021-04-02 DIAGNOSIS — T50.Z95A ADVERSE EFFECT OF OTHER VACCINES AND BIOLOGICAL SUBSTANCES, INITIAL ENCOUNTER: ICD-10-CM

## 2021-04-02 DIAGNOSIS — Z98.891 HISTORY OF UTERINE SCAR FROM PREVIOUS SURGERY: Chronic | ICD-10-CM

## 2021-04-02 DIAGNOSIS — Z79.2 LONG TERM (CURRENT) USE OF ANTIBIOTICS: ICD-10-CM

## 2021-04-02 DIAGNOSIS — Z79.01 LONG TERM (CURRENT) USE OF ANTICOAGULANTS: ICD-10-CM

## 2021-04-02 PROCEDURE — 99284 EMERGENCY DEPT VISIT MOD MDM: CPT

## 2021-04-02 RX ORDER — DILTIAZEM HCL 120 MG
0 CAPSULE, EXT RELEASE 24 HR ORAL
Qty: 0 | Refills: 0 | DISCHARGE

## 2021-04-02 RX ORDER — ALPRAZOLAM 0.25 MG
1 TABLET ORAL
Qty: 0 | Refills: 0 | DISCHARGE

## 2021-04-02 RX ORDER — APIXABAN 2.5 MG/1
0 TABLET, FILM COATED ORAL
Qty: 0 | Refills: 0 | DISCHARGE

## 2021-04-02 RX ORDER — ACETAMINOPHEN 500 MG
650 TABLET ORAL ONCE
Refills: 0 | Status: COMPLETED | OUTPATIENT
Start: 2021-04-02 | End: 2021-04-02

## 2021-04-02 RX ADMIN — Medication 650 MILLIGRAM(S): at 18:22

## 2021-04-02 NOTE — ED PROVIDER NOTE - ATTENDING CONTRIBUTION TO CARE
70 yr old f w/ a pmh significant for GERD, rheumatic fever, htn, hypothyroid, MVP, ETHAN who presents today with tachycardia and body aches. Pt states that she had the 2nd Moderna shot yesterday and states that she noticed that her HR was in the 90s. Pt states that she also had soreness at the site of the shot. Pt denies any fevers, chills, sob, vomiting, diarrhea, chest pain or any other complaints.     VITAL SIGNS: I have reviewed nursing notes and confirm.  CONSTITUTIONAL: non-toxic, well appearing  SKIN: no rash, no petechiae.  EYES: PERRL, EOMI, pink conjunctiva, anicteric  ENT: tongue midline, no exudates, MMM  NECK: Supple; no meningismus, no JVD  CARD: RRR, no murmurs, equal radial pulses bilaterally 2+  RESP: CTAB, no respiratory distress  ABD: Soft, non-tender, non-distended, no peritoneal signs, no HSM, no CVA tenderness  EXT: Normal ROM x4. No edema. No calves tenderness  NEURO: Alert, oriented. CN2-12 intact, equal strength bilaterally, nl gait.  PSYCH: Cooperative, appropriate.    a/p   79 yr old f that presents with post covid vaccine symptoms. will obtain ekg. given tylenol. pt to be discharged with pcp follow up and strict return precautions.

## 2021-04-02 NOTE — ED ADULT NURSE NOTE - CHPI ED NUR SYMPTOMS POS
patient states she received her second dose of the virus yesterday and today is experiencing bodyaches and fever

## 2021-04-02 NOTE — ED PROVIDER NOTE - NS ED ROS FT
Eyes:  No visual changes, eye pain or discharge.  ENMT:  No hearing changes, pain, discharge or infections. No neck pain or stiffness.  Cardiac:  No chest pain, SOB  Respiratory:  No cough or respiratory distress. No hemoptysis. No history of asthma or RAD.  GI:  No nausea, vomiting, diarrhea or abdominal pain.  MS:  + myalgias No  muscle weakness, joint pain or back pain.  Neuro:  No headache or weakness.  No LOC.  Skin:  No skin rash.   Endocrine: No history of thyroid disease or diabetes.  Except as documented in the HPI,  all other systems are negative.

## 2021-04-02 NOTE — ED PROVIDER NOTE - CLINICAL SUMMARY MEDICAL DECISION MAKING FREE TEXT BOX
79 yr old f that presents with post covid vaccine symptoms. will obtain ekg. given tylenol. pt to be discharged with pcp follow up and strict return precautions.

## 2021-04-02 NOTE — ED PROVIDER NOTE - OBJECTIVE STATEMENT
Pt is a 71y/o female presents today for eval of body aches and tachycardia x 1 day s/p 2nd Moderna vaccination yesterday. Pt denies abd pain, CP, SOB, N/V/D, Calf swelling, hemoptysis

## 2021-04-02 NOTE — ED PROVIDER NOTE - PATIENT PORTAL LINK FT
You can access the FollowMyHealth Patient Portal offered by [facility] by registering at the following website: [email or web address]. By joining [email or web address]’s FollowMyHealth portal, you will also be able to view your health information using other applications (apps) compatible with our system.

## 2021-05-07 NOTE — PATIENT PROFILE ADULT - NSPROGENDIFFINTUB_GEN_A_NUR
Patient will need to trial Wellbutrin first prior to Chantix per insurance.  Have sent patient Wellbutrin  mg once daily x3 days then increase up to 150 mg twice daily thereafter.      
previously intubated - no problems

## 2021-06-15 ENCOUNTER — APPOINTMENT (OUTPATIENT)
Age: 71
End: 2021-06-15
Payer: MEDICARE

## 2021-06-15 VITALS
DIASTOLIC BLOOD PRESSURE: 80 MMHG | WEIGHT: 209 LBS | SYSTOLIC BLOOD PRESSURE: 134 MMHG | HEIGHT: 69 IN | RESPIRATION RATE: 14 BRPM | BODY MASS INDEX: 30.96 KG/M2 | HEART RATE: 76 BPM | OXYGEN SATURATION: 97 %

## 2021-06-15 PROCEDURE — 99213 OFFICE O/P EST LOW 20 MIN: CPT

## 2021-06-15 NOTE — ASSESSMENT
[FreeTextEntry1] : Severe ETHAN on APAP \par Machine is recalled \par Intermittent asthma \par HO VTE after shoulder surgery, now off AC

## 2021-06-18 NOTE — OCCUPATIONAL THERAPY INITIAL EVALUATION ADULT - LEVEL OF INDEPENDENCE: SCOOT/BRIDGE, REHAB EVAL
injection  5 mL Intradermal Once    sodium chloride flush  5-40 mL Intravenous 2 times per day    [Held by provider] apixaban  10 mg Oral BID    sodium chloride flush  5-40 mL Intravenous 2 times per day    amiodarone  200 mg Oral Daily    [Held by provider] aspirin  81 mg Oral Daily    atorvastatin  10 mg Oral Nightly    pantoprazole  40 mg Oral QAM AC    vitamin D  2,000 Units Oral Daily    [Held by provider] sertraline  12.5 mg Oral Daily    miconazole   Topical BID     PRN Meds: traMADol, sodium chloride flush, sodium chloride, sodium chloride flush, sodium chloride, ondansetron **OR** ondansetron, polyethylene glycol, acetaminophen **OR** acetaminophen      Intake/Output Summary (Last 24 hours) at 6/18/2021 1202  Last data filed at 6/18/2021 1050  Gross per 24 hour   Intake 2305.2 ml   Output 0 ml   Net 2305.2 ml       Physical Exam Performed:  BP (!) 107/54   Pulse 61   Temp 97.7 °F (36.5 °C) (Oral)   Resp 20   Ht 5' (1.524 m)   Wt 128 lb (58.1 kg)   SpO2 95%   BMI 25.00 kg/m²     General appearance: Elderly CF in no apparent distress, appears stated age and cooperative. HEENT: Pupils equal, round, and reactive to light. Conjunctivae/corneas clear. Pallor present   Neck: Supple, with full range of motion. No jugular venous distention. Trachea midline. Respiratory:  Normal respiratory effort. Clear to auscultation, bilaterally without Rales/Wheezes/Rhonchi. Cardiovascular: Regular rate and rhythm with normal S1/S2 without murmurs, rubs or gallops. Abdomen: Soft, non-tender, non-distended with normal bowel sounds. Musculoskeletal: No clubbing, cyanosis or edema bilaterally. Full range of motion without deformity. Skin: Skin color, texture, turgor normal.  No rashes or lesions. Neurologic:  Neurovascularly intact without any focal sensory/motor deficits.  Cranial nerves: II-XII intact, grossly non-focal.  Psychiatric: Alert and oriented, thought content appropriate, normal insight  Capillary Refill: Brisk,< 3 seconds   Peripheral Pulses: +2 palpable, equal bilaterally     Labs:   Recent Labs     06/16/21  0754 06/17/21  0206 06/18/21  0136 06/18/21  0627 06/18/21  0906   WBC 10.8 14.4*  --  9.0  --    HGB 8.0* 7.1* 5.1* 4.9* 4.6*   HCT 23.5* 21.4* 15.2* 14.6* 13.8*    403  --  291  --      Recent Labs     06/17/21  0206 06/17/21  1115 06/18/21  0627    134* 140   K 4.2 4.0 3.6    103 112*   CO2 23 24 23   BUN 19 21* 21*   CREATININE 1.4* 1.2 0.7   CALCIUM 7.5* 7.7* 7.1*     Urinalysis:    Lab Results   Component Value Date    NITRU Negative 06/13/2021    BLOODU Negative 06/13/2021    SPECGRAV 1.015 06/13/2021    GLUCOSEU Negative 06/13/2021    GLUCOSEU Neg 04/25/2010       Radiology:  NM GI BLOOD LOSS   Final Result   Focus of extravascular accumulation of activity in the left pelvis suspicious   for small acute GI bleed. Location corresponds to the sigmoid colon which   has very severe diverticular disease. RECOMMENDATIONS:   Evaluation of the patient's neck is bloody bowel movement for radio activity   could be performed and limited repeat scanning could be performed      If the patient shows hemodynamic signs of an active bleed in the next 20   hours, additional images can be acquired. CT ABDOMEN PELVIS WO CONTRAST Additional Contrast? None   Final Result   1. No acute abnormality to correlate to history of blood loss/anemia. 2. Cholelithiasis with moderate mucosal thickening of the gallbladder. Recommend correlation with clinical workup. If there is any concern for   underlying cholecystitis, a follow-up ultrasound may be considered. 3. Chronic interstitial changes partially observed in the lower chest.         VL Extremity Venous Bilateral   Final Result      CTA CHEST ABDOMEN PELVIS W CONTRAST   Final Result   1. CT CHEST: Acute pulmonary embolism proximally within the pulmonary artery   branch to the left lower lobe.    2. No CT findings of right heart strain. 3. Mild calcific atherosclerosis aorta and its branches without aneurysm or   dissection. 4. CT ABDOMEN/PELVIS: No acute vascular abnormality. 5.  No CT evidence of an acute intra-abdominal or intrapelvic process. 6. Cholelithiasis without CT findings of acute cholecystitis. 7.  Diverticulosis coli without CT evidence of acute diverticulitis. 8. Small, fat containing left inguinal hernia. 9. Small, fat containing umbilical hernia. 10. Possible constipation. Critical results were called by Dr. Alan Mccloud to Naresh Finch on   6/13/2021 at 13:03. XR CHEST PORTABLE   Final Result   No acute cardiopulmonary disease. IR ANGIOGRAM INFERIOR MESENTERIC    (Results Pending)         Assessment/Plan:    Active Hospital Problems    Diagnosis Date Noted    Pulmonary embolism on left [facility]) [I26.99] 06/13/2021    Rectal bleeding [K62.5] 10/04/2014     1. Syncope in the setting of acute left segmental PE POA  -As evidenced by CT PA on admission. Patient started on heparin GTT in ED. Lost IV access and it was unable to be re-established. D/w GI, and ok with starting eliquis (started on 6/15/2021) . -Bilateral venous duplex of lower extremities 6/14/2021 showed no evidence of DVT in either extremity.  -Some increasing SOB, requiring 2L/min O2, likely r/t progressive anemia with acute blood loss. Requested picc placement on 6/16/2021 in anticipation of volume replacement.   -Continues with BRBPR and eliquis has been held since 6/17 am d/t downtrending Hb. INR obtained 2/2 picc placement and found to be 3.83.   -Hb trended down to 7.1 early 6/17.   -CT abdomen 6/17 showed no acute abnormality to correlate to history of blood loss/anemia.   No retroperitoneal hematoma  -Hematology was consulted 6/17/2021 and plan for IVC filter placement with Dr. Denisse Corrales, Venofer 200 mg IV X 3 doses, Vitamin B12 shots daily X 7 days, and Folate 2 mg PO daily, in order to discontinue modified independence

## 2021-07-03 ENCOUNTER — EMERGENCY (EMERGENCY)
Facility: HOSPITAL | Age: 71
LOS: 0 days | Discharge: HOME | End: 2021-07-03
Attending: EMERGENCY MEDICINE | Admitting: EMERGENCY MEDICINE
Payer: MEDICARE

## 2021-07-03 VITALS
HEART RATE: 73 BPM | RESPIRATION RATE: 17 BRPM | SYSTOLIC BLOOD PRESSURE: 155 MMHG | DIASTOLIC BLOOD PRESSURE: 73 MMHG | OXYGEN SATURATION: 95 % | WEIGHT: 205.03 LBS | HEIGHT: 68 IN | TEMPERATURE: 98 F

## 2021-07-03 DIAGNOSIS — Z79.890 HORMONE REPLACEMENT THERAPY: ICD-10-CM

## 2021-07-03 DIAGNOSIS — I10 ESSENTIAL (PRIMARY) HYPERTENSION: ICD-10-CM

## 2021-07-03 DIAGNOSIS — Z88.6 ALLERGY STATUS TO ANALGESIC AGENT: ICD-10-CM

## 2021-07-03 DIAGNOSIS — Z86.69 PERSONAL HISTORY OF OTHER DISEASES OF THE NERVOUS SYSTEM AND SENSE ORGANS: ICD-10-CM

## 2021-07-03 DIAGNOSIS — M25.551 PAIN IN RIGHT HIP: ICD-10-CM

## 2021-07-03 DIAGNOSIS — N39.0 URINARY TRACT INFECTION, SITE NOT SPECIFIED: ICD-10-CM

## 2021-07-03 DIAGNOSIS — E03.9 HYPOTHYROIDISM, UNSPECIFIED: ICD-10-CM

## 2021-07-03 DIAGNOSIS — Z87.81 PERSONAL HISTORY OF (HEALED) TRAUMATIC FRACTURE: ICD-10-CM

## 2021-07-03 DIAGNOSIS — Z91.040 LATEX ALLERGY STATUS: ICD-10-CM

## 2021-07-03 DIAGNOSIS — Z86.79 PERSONAL HISTORY OF OTHER DISEASES OF THE CIRCULATORY SYSTEM: ICD-10-CM

## 2021-07-03 DIAGNOSIS — Z79.899 OTHER LONG TERM (CURRENT) DRUG THERAPY: ICD-10-CM

## 2021-07-03 DIAGNOSIS — Z87.39 PERSONAL HISTORY OF OTHER DISEASES OF THE MUSCULOSKELETAL SYSTEM AND CONNECTIVE TISSUE: ICD-10-CM

## 2021-07-03 DIAGNOSIS — Z99.89 DEPENDENCE ON OTHER ENABLING MACHINES AND DEVICES: ICD-10-CM

## 2021-07-03 DIAGNOSIS — Z98.42 CATARACT EXTRACTION STATUS, LEFT EYE: Chronic | ICD-10-CM

## 2021-07-03 DIAGNOSIS — K21.9 GASTRO-ESOPHAGEAL REFLUX DISEASE WITHOUT ESOPHAGITIS: ICD-10-CM

## 2021-07-03 DIAGNOSIS — Z98.890 OTHER SPECIFIED POSTPROCEDURAL STATES: Chronic | ICD-10-CM

## 2021-07-03 DIAGNOSIS — Z88.0 ALLERGY STATUS TO PENICILLIN: ICD-10-CM

## 2021-07-03 DIAGNOSIS — Z95.4 PRESENCE OF OTHER HEART-VALVE REPLACEMENT: ICD-10-CM

## 2021-07-03 DIAGNOSIS — Z88.8 ALLERGY STATUS TO OTHER DRUGS, MEDICAMENTS AND BIOLOGICAL SUBSTANCES: ICD-10-CM

## 2021-07-03 DIAGNOSIS — M79.661 PAIN IN RIGHT LOWER LEG: ICD-10-CM

## 2021-07-03 DIAGNOSIS — Z98.42 CATARACT EXTRACTION STATUS, LEFT EYE: ICD-10-CM

## 2021-07-03 DIAGNOSIS — Z87.09 PERSONAL HISTORY OF OTHER DISEASES OF THE RESPIRATORY SYSTEM: ICD-10-CM

## 2021-07-03 DIAGNOSIS — Z98.891 HISTORY OF UTERINE SCAR FROM PREVIOUS SURGERY: Chronic | ICD-10-CM

## 2021-07-03 LAB
APPEARANCE UR: CLEAR — SIGNIFICANT CHANGE UP
BACTERIA # UR AUTO: ABNORMAL
BILIRUB UR-MCNC: NEGATIVE — SIGNIFICANT CHANGE UP
COD CRY URNS QL: NEGATIVE — SIGNIFICANT CHANGE UP
COLOR SPEC: YELLOW — SIGNIFICANT CHANGE UP
DIFF PNL FLD: NEGATIVE — SIGNIFICANT CHANGE UP
EPI CELLS # UR: ABNORMAL /HPF
GLUCOSE UR QL: NEGATIVE MG/DL — SIGNIFICANT CHANGE UP
GRAN CASTS # UR COMP ASSIST: NEGATIVE — SIGNIFICANT CHANGE UP
HYALINE CASTS # UR AUTO: NEGATIVE — SIGNIFICANT CHANGE UP
KETONES UR-MCNC: NEGATIVE — SIGNIFICANT CHANGE UP
LEUKOCYTE ESTERASE UR-ACNC: ABNORMAL
NITRITE UR-MCNC: NEGATIVE — SIGNIFICANT CHANGE UP
PH UR: 7.5 — SIGNIFICANT CHANGE UP (ref 5–8)
PROT UR-MCNC: NEGATIVE MG/DL — SIGNIFICANT CHANGE UP
RBC CASTS # UR COMP ASSIST: NEGATIVE — SIGNIFICANT CHANGE UP
SP GR SPEC: 1.01 — SIGNIFICANT CHANGE UP (ref 1.01–1.03)
TRI-PHOS CRY UR QL COMP ASSIST: NEGATIVE — SIGNIFICANT CHANGE UP
URATE CRY FLD QL MICRO: NEGATIVE — SIGNIFICANT CHANGE UP
UROBILINOGEN FLD QL: 0.2 MG/DL — SIGNIFICANT CHANGE UP (ref 0.2–0.2)
WBC UR QL: ABNORMAL /HPF

## 2021-07-03 PROCEDURE — 72192 CT PELVIS W/O DYE: CPT | Mod: 26,MA

## 2021-07-03 PROCEDURE — 73552 X-RAY EXAM OF FEMUR 2/>: CPT | Mod: 26,RT

## 2021-07-03 PROCEDURE — 93970 EXTREMITY STUDY: CPT | Mod: 26

## 2021-07-03 PROCEDURE — 73502 X-RAY EXAM HIP UNI 2-3 VIEWS: CPT | Mod: 26,RT

## 2021-07-03 PROCEDURE — 72170 X-RAY EXAM OF PELVIS: CPT | Mod: 26,59

## 2021-07-03 PROCEDURE — 99284 EMERGENCY DEPT VISIT MOD MDM: CPT

## 2021-07-03 RX ORDER — NITROFURANTOIN MACROCRYSTAL 50 MG
1 CAPSULE ORAL
Qty: 10 | Refills: 0
Start: 2021-07-03 | End: 2021-07-07

## 2021-07-03 NOTE — ED PROVIDER NOTE - CARE PROVIDER_API CALL
Harshil Estrada)  Orthopaedic Surgery  [address] [place] [address]  Phone: [phone]  Fax: [phone]  Follow Up Time: Routine

## 2021-07-03 NOTE — ED PROVIDER NOTE - PROGRESS NOTE DETAILS
discussed findings regarding ff surrounding appendix with radiologist, radiologist states no concern for appendicitis. abd soft, non-tender, no rebound/guarding.  results discussed with patient, given strict return precautions, verbalizes understanding of plan and will fu with primary care doctor.

## 2021-07-03 NOTE — ED PROVIDER NOTE - ATTENDING CONTRIBUTION TO CARE
70 F to ED with groin pain  No fevers, no sick contacts, no travel  pt states pain with bearing wt and R hip flexion  H/o PE and she was worried about DVT so to ED for eval. 70 F to ED with groin pain  No fevers, no sick contacts, no travel  pt states pain with bearing wt and R hip flexion  H/o PE and she was worried about DVT so to ED for eval.  AVSS, exam as noted, CTAB, RRR, abdomen soft NTND, (+) bowel sounds,

## 2021-07-03 NOTE — ED PROVIDER NOTE - NSFOLLOWUPINSTRUCTIONS_ED_ALL_ED_FT
Please follow up with your primary care doctor in 1-3 days  Please be aware of any new or worsening signs or symptoms that should prompt your return to the ER.      Urinary Tract Infection    A urinary tract infection (UTI) is an infection of any part of the urinary tract, which includes the kidneys, ureters, bladder, and urethra. Risk factors include ignoring your need to urinate, wiping back to front if female, being an uncircumcised male, and having diabetes or a weak immune system. Symptoms include frequent urination, pain or burning with urination, foul smelling urine, cloudy urine, pain in the lower abdomen, blood in the urine, and fever. If you were prescribed an antibiotic medicine, take it as told by your health care provider. Do not stop taking the antibiotic even if you start to feel better.    SEEK IMMEDIATE MEDICAL CARE IF YOU HAVE THE FOLLOWING SYMPTOMS: severe back or abdominal pain, inability to keep fluids or medicine down, dizziness/lightheadedness, or a change in mental status.      Hip Pain    Your hip is the joint between your upper legs and your lower pelvis. The bones, cartilage, tendons, and muscles of your hip joint perform a lot of work each day supporting your body weight and allowing you to move around.    Hip pain can range from a minor ache to severe pain in one or both of your hips. Pain may be felt on the inside of the hip joint near the groin, or the outside near the buttocks and upper thigh. You may have swelling or stiffness as well.     HOME CARE INSTRUCTIONS  Take medicines only as directed by your health care provider.  Apply ice to the injured area:  Put ice in a plastic bag.  Place a towel between your skin and the bag.  Leave the ice on for 15–20 minutes at a time, 3–4 times a day.  Keep your leg raised (elevated) when possible to lessen swelling.  Avoid activities that cause pain.  Follow specific exercises as directed by your health care provider.  Sleep with a pillow between your legs on your most comfortable side.  Record how often you have hip pain, the location of the pain, and what it feels like.     SEEK MEDICAL CARE IF:  You are unable to put weight on your leg.  Your hip is red or swollen or very tender to touch.  Your pain or swelling continues or worsens after 1 week.  You have increasing difficulty walking.  You have a fever.    SEEK IMMEDIATE MEDICAL CARE IF:  You have fallen.  You have a sudden increase in pain and swelling in your hip.    MAKE SURE YOU:  Understand these instructions.  Will watch your condition.  Will get help right away if you are not doing well or get worse.    ADDITIONAL NOTES AND INSTRUCTIONS    Please follow up with your Primary MD in 24-48 hr.  Seek immediate medical care for any new/worsening signs or symptoms.

## 2021-07-03 NOTE — ED PROVIDER NOTE - PSH
Cataract extraction status of eye, left    H/O knee surgery  torn minsicus repair b/l knee  H/O:

## 2021-07-03 NOTE — ED ADULT NURSE NOTE - NS ED NURSE RECORD ANOTHER HT AND WT
TRANSFER - OUT REPORT:    Verbal report given to [facility] RN on Suzanne Dozier  being transferred to [facility] for ordered procedure       Report consisted of patients Situation, Background, Assessment and   Recommendations(SBAR). Information from the following report(s) Procedure Summary was reviewed with the receiving nurse. Lines:   Peripheral IV 08/09/18 Right Hand (Active)   Site Assessment Clean, dry, & intact 8/9/2018  7:50 PM   Phlebitis Assessment 0 8/9/2018  7:50 PM   Infiltration Assessment 0 8/9/2018  7:50 PM   Dressing Status Clean, dry, & intact 8/9/2018  7:50 PM   Dressing Type Transparent;Tape 8/9/2018  7:50 PM   Hub Color/Line Status Pink; Infusing 8/9/2018  7:50 PM   Alcohol Cap Used No 8/9/2018  7:50 PM        Opportunity for questions and clarification was provided. Patient transported with:   O2 @ 2 liters  Tech    VTE prophylaxis orders have been written for Suzanne Dozier. Patient and family given floor number and nurses name. Family updated re: pt status after security code verified. Yes

## 2021-07-03 NOTE — ED PROVIDER NOTE - PATIENT PORTAL LINK FT
You can access the FollowMyHealth Patient Portal offered by [facility] by registering at the following website: [email or web address]. By joining [employer]’s FollowMyHealth portal, you will also be able to view your health information using other applications (apps) compatible with our system.

## 2021-07-03 NOTE — ED PROVIDER NOTE - OBJECTIVE STATEMENT
70 year old female, past medical history migraines, atrial tachycardia, who presents with right leg pain x1 week. patient endorses constant, non-radiating right leg/hip pain, worse w/ ambulation and movement. denies recent falls/trauma. no fever, chills, skin changes, hip pain, back pain, urinary symptoms, bowel changes, nausea/vomiting.

## 2021-07-03 NOTE — ED PROVIDER NOTE - PHYSICAL EXAMINATION
CONSTITUTIONAL: Well-developed; well-nourished; in no acute distress, nontoxic appearing  SKIN: skin exam is warm and dry  HEAD: Normocephalic; atraumatic.  NECK:  ROM intact.  CARD: S1, S2 normal, no murmur  RESP: Good air movement bilaterally  ABD: soft; non-distended; non-tender. No Rebound, No guarding  EXT: +TTP overlying R groin, no palpable cord, no skin changes, no joint swelling. Pulses 2+. pelvis stable. no deformity. steady gait.   NEURO: awake, alert, following commands, oriented, grossly unremarkable. No Focal deficits. GCS 15.   PSYCH: Cooperative, appropriate.

## 2021-07-03 NOTE — ED PROVIDER NOTE - NS ED ROS FT
Review of Systems:  	•	CONSTITUTIONAL: no fever, no chills  	•	SKIN: no rash  	•	ENT: no sore throat  	•	RESPIRATORY: no shortness of breath, no cough  	•	CARDIAC: no chest pain, no palpitations  	•	GI: no abd pain, no nausea, no vomiting, no diarrhea  	•	GENITO-URINARY: no discharge, no dysuria; no hematuria, no increased urinary frequency  	•	MUSCULOSKELETAL: +R hip/leg pain, no skin changes, no swelling   	•	NEUROLOGIC: no weakness, no headache, no paresthesias, no LOC  	•	PSYCH: no anxiety, non suicidal, non homicidal, no hallucination, no depression

## 2021-07-03 NOTE — ED PROVIDER NOTE - NSFOLLOWUPCLINICS_GEN_ALL_ED_FT
[facility]  Orthpedic  [address], [place]   Phone: [phone]  Fax:   Follow Up Time: Routine

## 2021-07-04 ENCOUNTER — EMERGENCY (EMERGENCY)
Facility: HOSPITAL | Age: 71
LOS: 0 days | Discharge: HOME | End: 2021-07-04
Attending: EMERGENCY MEDICINE | Admitting: EMERGENCY MEDICINE
Payer: MEDICARE

## 2021-07-04 VITALS
SYSTOLIC BLOOD PRESSURE: 158 MMHG | RESPIRATION RATE: 20 BRPM | TEMPERATURE: 98 F | DIASTOLIC BLOOD PRESSURE: 75 MMHG | HEART RATE: 82 BPM | HEIGHT: 68 IN | OXYGEN SATURATION: 99 % | WEIGHT: 205.03 LBS

## 2021-07-04 DIAGNOSIS — Z88.0 ALLERGY STATUS TO PENICILLIN: ICD-10-CM

## 2021-07-04 DIAGNOSIS — M25.551 PAIN IN RIGHT HIP: ICD-10-CM

## 2021-07-04 DIAGNOSIS — Z86.79 PERSONAL HISTORY OF OTHER DISEASES OF THE CIRCULATORY SYSTEM: ICD-10-CM

## 2021-07-04 DIAGNOSIS — Z87.39 PERSONAL HISTORY OF OTHER DISEASES OF THE MUSCULOSKELETAL SYSTEM AND CONNECTIVE TISSUE: ICD-10-CM

## 2021-07-04 DIAGNOSIS — Z87.09 PERSONAL HISTORY OF OTHER DISEASES OF THE RESPIRATORY SYSTEM: ICD-10-CM

## 2021-07-04 DIAGNOSIS — Z88.5 ALLERGY STATUS TO NARCOTIC AGENT: ICD-10-CM

## 2021-07-04 DIAGNOSIS — Z86.69 PERSONAL HISTORY OF OTHER DISEASES OF THE NERVOUS SYSTEM AND SENSE ORGANS: ICD-10-CM

## 2021-07-04 DIAGNOSIS — E03.9 HYPOTHYROIDISM, UNSPECIFIED: ICD-10-CM

## 2021-07-04 DIAGNOSIS — Z87.81 PERSONAL HISTORY OF (HEALED) TRAUMATIC FRACTURE: ICD-10-CM

## 2021-07-04 DIAGNOSIS — I10 ESSENTIAL (PRIMARY) HYPERTENSION: ICD-10-CM

## 2021-07-04 DIAGNOSIS — Z98.42 CATARACT EXTRACTION STATUS, LEFT EYE: ICD-10-CM

## 2021-07-04 DIAGNOSIS — Z88.6 ALLERGY STATUS TO ANALGESIC AGENT: ICD-10-CM

## 2021-07-04 DIAGNOSIS — Z88.8 ALLERGY STATUS TO OTHER DRUGS, MEDICAMENTS AND BIOLOGICAL SUBSTANCES: ICD-10-CM

## 2021-07-04 DIAGNOSIS — K21.9 GASTRO-ESOPHAGEAL REFLUX DISEASE WITHOUT ESOPHAGITIS: ICD-10-CM

## 2021-07-04 DIAGNOSIS — Z98.890 OTHER SPECIFIED POSTPROCEDURAL STATES: Chronic | ICD-10-CM

## 2021-07-04 DIAGNOSIS — Z79.890 HORMONE REPLACEMENT THERAPY: ICD-10-CM

## 2021-07-04 DIAGNOSIS — Z98.42 CATARACT EXTRACTION STATUS, LEFT EYE: Chronic | ICD-10-CM

## 2021-07-04 DIAGNOSIS — Z91.040 LATEX ALLERGY STATUS: ICD-10-CM

## 2021-07-04 DIAGNOSIS — Z99.89 DEPENDENCE ON OTHER ENABLING MACHINES AND DEVICES: ICD-10-CM

## 2021-07-04 DIAGNOSIS — Z79.899 OTHER LONG TERM (CURRENT) DRUG THERAPY: ICD-10-CM

## 2021-07-04 DIAGNOSIS — Z98.891 HISTORY OF UTERINE SCAR FROM PREVIOUS SURGERY: Chronic | ICD-10-CM

## 2021-07-04 PROCEDURE — 99283 EMERGENCY DEPT VISIT LOW MDM: CPT

## 2021-07-04 NOTE — ED PROVIDER NOTE - CARE PROVIDER_API CALL
[address],   [facility]  [phone]  Phone: (   )    -  Fax: (   )    -  Follow Up Time: Routine

## 2021-07-04 NOTE — ED PROVIDER NOTE - OBJECTIVE STATEMENT
71 year old female, past medical history atrial tachycardia, migraines, who presents with right hip pain. patient presented to ED last night, had negative CT, vascular duplex, XRs, patient given return precautions and recommended fu with orthopedics and primary care doctor. patient states she was ambulating today when she slipped, resulting in worsening pain to RLE, did not fall/hit head. denies fever, chills, nausea/vomiting, bowel changes, urinary symptoms. no recent falls/trauma. patient has been tolerating po.

## 2021-07-04 NOTE — ED PROVIDER NOTE - PROVIDER TOKENS
FREE:[LAST:[[address] [facility]],PHONE:[(   )    -],FAX:[(   )    -],ADDRESS:[[facility]  [phone]],FOLLOWUP:[Routine]]

## 2021-07-04 NOTE — ED PROVIDER NOTE - PHYSICAL EXAMINATION
CONSTITUTIONAL: Well-developed; well-nourished; in no acute distress, nontoxic appearing  SKIN: skin exam is warm and dry  HEAD: Normocephalic; atraumatic.  ENT: MMM  NECK: ROM intact.  CARD: S1, S2 normal, no murmur  RESP: Good air movement bilaterally  ABD: soft; non-distended; non-tender. No rebound/guarding   EXT: R groin pain exacerbated w/ ROM of RLE, no skin changes, pulses 2+. no midline tenderness.   NEURO: awake, alert, following commands, oriented, grossly unremarkable. No Focal deficits. GCS 15.   PSYCH: Cooperative, appropriate. CONSTITUTIONAL: Well-developed; well-nourished; in no acute distress, nontoxic appearing  SKIN: skin exam is warm and dry  HEAD: Normocephalic; atraumatic.  ENT: MMM  NECK: ROM intact.  CARD: S1, S2 normal, no murmur  RESP: Good air movement bilaterally  ABD: soft; non-distended; non-tender. No rebound/guarding   EXT: pain to medial aspect of right hip exacerbated w/ ROM of RLE, no skin changes, pulses 2+. no midline tenderness.   NEURO: awake, alert, following commands, oriented, grossly unremarkable. No Focal deficits. GCS 15.   PSYCH: Cooperative, appropriate.

## 2021-07-04 NOTE — ED PROVIDER NOTE - ATTENDING CONTRIBUTION TO CARE
I was present for and supervised the key and critical aspects of the procedures performed during the care of the patient. patient presents for evaluation of right hip pain that is moderate and throbbing in nature it is improved at this time she was seen here 1day prior with similar complaints no findings at this time   on exam patient is nc/at perrla eomi oropharynx clear cta b/l, rrr s1s2 noted abd-soft nt ndbs+ ext from with no focal deficits.  she has no guarding no rebound pedal pulses 2 +=  a/p patient has no signs of appendicitis at this time, given repeat visit we offered ct abd pelvis and repeat labs however, patient does not want to pursue at this time I will discharge with low threshold for return

## 2021-07-04 NOTE — ED PROVIDER NOTE - PROGRESS NOTE DETAILS
harjit: given patients return to ED due to persistent pain, recommended labs/ct. patient refused labs/ct and states she will fu with her primary care doctor this week. harjit: given patients return to ED due to persistent pain, recommended labs/ct. risks and benefits discussed with patient regarding benefit of further evaluation of symptoms, patient and son at bedside, verbalize understanding risks/benefits and would like to be discharged with close follow up with primary care doctor, refused ct/labs. patient and son very reliable, given strict return precautions. patient refusing repeat ct based on radiation exposure

## 2021-07-04 NOTE — ED PROVIDER NOTE - CLINICAL SUMMARY MEDICAL DECISION MAKING FREE TEXT BOX
patient presents for evaluation of right hip pain that is moderate and throbbing in nature she was seen and evaluated 1 day prior with similar findings at this time she had ct, us, and labs which were negative at this time patient has no signs of appendicitis she is eating well with no fevers and no chills with no ttp no guarding and no rebound.  we have discussed risks benefits and alternative I have suggested obtaining repeat labs and repeat ct scan based on continued symptoms however, her son and the patient are refusing at this time they are aware of the consequences of his choices. they are aware of indications to return instructed to have low threshold for return evaluation

## 2021-07-04 NOTE — ED PROVIDER NOTE - NS ED ROS FT
Review of Systems:  	•	CONSTITUTIONAL: no fever, no chills  	•	SKIN: no rash  	•	ENT: no sore throat  	•	RESPIRATORY: no shortness of breath, no cough  	•	CARDIAC: no chest pain, no palpitations  	•	GI: no abd pain, no nausea, no bowel changes   	•	GENITO-URINARY: no discharge, no dysuria; no hematuria, no increased urinary frequency  	•	MUSCULOSKELETAL: +R hip pain, no joint swelling/redness   	•	NEUROLOGIC: no weakness, no numbness, no paresthesias, no headache   	•	PSYCH: no anxiety, non suicidal, non homicidal, no hallucination, no depression

## 2021-07-04 NOTE — ED PROVIDER NOTE - NSFOLLOWUPINSTRUCTIONS_ED_ALL_ED_FT
Please follow up with your primary care doctor in 1-3 days  Please be aware of any new or worsening signs or symptoms that should prompt your return to the ER.      Hip Pain    Your hip is the joint between your upper legs and your lower pelvis. The bones, cartilage, tendons, and muscles of your hip joint perform a lot of work each day supporting your body weight and allowing you to move around.    Hip pain can range from a minor ache to severe pain in one or both of your hips. Pain may be felt on the inside of the hip joint near the groin, or the outside near the buttocks and upper thigh. You may have swelling or stiffness as well.     HOME CARE INSTRUCTIONS  Take medicines only as directed by your health care provider.  Apply ice to the injured area:  Put ice in a plastic bag.  Place a towel between your skin and the bag.  Leave the ice on for 15–20 minutes at a time, 3–4 times a day.  Keep your leg raised (elevated) when possible to lessen swelling.  Avoid activities that cause pain.  Follow specific exercises as directed by your health care provider.  Sleep with a pillow between your legs on your most comfortable side.  Record how often you have hip pain, the location of the pain, and what it feels like.     SEEK MEDICAL CARE IF:  You are unable to put weight on your leg.  Your hip is red or swollen or very tender to touch.  Your pain or swelling continues or worsens after 1 week.  You have increasing difficulty walking.  You have a fever.    SEEK IMMEDIATE MEDICAL CARE IF:  You have fallen.  You have a sudden increase in pain and swelling in your hip.    MAKE SURE YOU:  Understand these instructions.  Will watch your condition.  Will get help right away if you are not doing well or get worse.    ADDITIONAL NOTES AND INSTRUCTIONS    Please follow up with your Primary MD in 24-48 hr.  Seek immediate medical care for any new/worsening signs or symptoms.

## 2021-07-04 NOTE — ED ADULT NURSE NOTE - NSIMPLEMENTINTERV_GEN_ALL_ED
Implemented All Universal Safety Interventions:  [place] to call system. Call bell, personal items and telephone within reach. Instruct patient to call for assistance. Room bathroom lighting operational. Non-slip footwear when patient is off stretcher. Physically safe environment: no spills, clutter or unnecessary equipment. Stretcher in lowest position, wheels locked, appropriate side rails in place.
[id number]

## 2021-07-04 NOTE — ED ADULT TRIAGE NOTE - CHIEF COMPLAINT QUOTE
"I was seen yesterday for right hip pain for 1 week, they told me to come back if I was still in pain and I am"

## 2021-07-04 NOTE — ED PROVIDER NOTE - PATIENT PORTAL LINK FT
You can access the FollowMyHealth Patient Portal offered by [facility] by registering at the following website: [email or web address]. By joining [employer]’s FollowMyHealth portal, you will also be able to view your health information using other applications (apps) compatible with our system.

## 2021-07-05 ENCOUNTER — EMERGENCY (EMERGENCY)
Facility: HOSPITAL | Age: 71
LOS: 0 days | Discharge: HOME | End: 2021-07-05
Attending: EMERGENCY MEDICINE | Admitting: EMERGENCY MEDICINE
Payer: MEDICARE

## 2021-07-05 VITALS
HEART RATE: 66 BPM | HEIGHT: 68 IN | RESPIRATION RATE: 17 BRPM | SYSTOLIC BLOOD PRESSURE: 144 MMHG | TEMPERATURE: 98 F | WEIGHT: 205.03 LBS | OXYGEN SATURATION: 100 % | DIASTOLIC BLOOD PRESSURE: 60 MMHG

## 2021-07-05 DIAGNOSIS — Z88.0 ALLERGY STATUS TO PENICILLIN: ICD-10-CM

## 2021-07-05 DIAGNOSIS — I10 ESSENTIAL (PRIMARY) HYPERTENSION: ICD-10-CM

## 2021-07-05 DIAGNOSIS — Z86.79 PERSONAL HISTORY OF OTHER DISEASES OF THE CIRCULATORY SYSTEM: ICD-10-CM

## 2021-07-05 DIAGNOSIS — Z98.890 OTHER SPECIFIED POSTPROCEDURAL STATES: ICD-10-CM

## 2021-07-05 DIAGNOSIS — Z87.09 PERSONAL HISTORY OF OTHER DISEASES OF THE RESPIRATORY SYSTEM: ICD-10-CM

## 2021-07-05 DIAGNOSIS — Z98.891 HISTORY OF UTERINE SCAR FROM PREVIOUS SURGERY: Chronic | ICD-10-CM

## 2021-07-05 DIAGNOSIS — E03.9 HYPOTHYROIDISM, UNSPECIFIED: ICD-10-CM

## 2021-07-05 DIAGNOSIS — Z91.040 LATEX ALLERGY STATUS: ICD-10-CM

## 2021-07-05 DIAGNOSIS — Z86.69 PERSONAL HISTORY OF OTHER DISEASES OF THE NERVOUS SYSTEM AND SENSE ORGANS: ICD-10-CM

## 2021-07-05 DIAGNOSIS — Z86.19 PERSONAL HISTORY OF OTHER INFECTIOUS AND PARASITIC DISEASES: ICD-10-CM

## 2021-07-05 DIAGNOSIS — Z88.8 ALLERGY STATUS TO OTHER DRUGS, MEDICAMENTS AND BIOLOGICAL SUBSTANCES STATUS: ICD-10-CM

## 2021-07-05 DIAGNOSIS — K21.9 GASTRO-ESOPHAGEAL REFLUX DISEASE WITHOUT ESOPHAGITIS: ICD-10-CM

## 2021-07-05 DIAGNOSIS — Z98.890 OTHER SPECIFIED POSTPROCEDURAL STATES: Chronic | ICD-10-CM

## 2021-07-05 DIAGNOSIS — Z79.899 OTHER LONG TERM (CURRENT) DRUG THERAPY: ICD-10-CM

## 2021-07-05 DIAGNOSIS — R00.2 PALPITATIONS: ICD-10-CM

## 2021-07-05 DIAGNOSIS — Z88.5 ALLERGY STATUS TO NARCOTIC AGENT: ICD-10-CM

## 2021-07-05 DIAGNOSIS — Z87.39 PERSONAL HISTORY OF OTHER DISEASES OF THE MUSCULOSKELETAL SYSTEM AND CONNECTIVE TISSUE: ICD-10-CM

## 2021-07-05 DIAGNOSIS — Z98.42 CATARACT EXTRACTION STATUS, LEFT EYE: Chronic | ICD-10-CM

## 2021-07-05 LAB
CULTURE RESULTS: SIGNIFICANT CHANGE UP
SPECIMEN SOURCE: SIGNIFICANT CHANGE UP

## 2021-07-05 PROCEDURE — 93010 ELECTROCARDIOGRAM REPORT: CPT | Mod: NC

## 2021-07-05 PROCEDURE — 99284 EMERGENCY DEPT VISIT MOD MDM: CPT

## 2021-07-05 RX ORDER — SODIUM CHLORIDE 9 MG/ML
1000 INJECTION INTRAMUSCULAR; INTRAVENOUS; SUBCUTANEOUS ONCE
Refills: 0 | Status: DISCONTINUED | OUTPATIENT
Start: 2021-07-05 | End: 2021-07-05

## 2021-07-05 RX ORDER — DILTIAZEM HCL 120 MG
130 CAPSULE, EXT RELEASE 24 HR ORAL
Qty: 0 | Refills: 0 | DISCHARGE

## 2021-07-05 NOTE — ED PROVIDER NOTE - PHYSICAL EXAMINATION
Physical Exam    Vital Signs: I have reviewed the initial vital signs.  Constitutional: well-nourished, appears stated age, no acute distress  Eyes: Conjunctiva pink, Sclera clear,  Cardiovascular: S1 and S2, regular rate, regular rhythm, well-perfused extremities, radial pulses equal and 2+, calves nonttp, equal in size  Respiratory: unlabored respiratory effort, speaking in full sentences, handling oral secretions, % on RA , on nasal cannula, clear to auscultation bilaterally no wheezing, rales and rhonchi  Musculoskeletal: supple neck, no lower extremity edema, no midline tenderness, paraspinal tenderness, clavicular creptius, painful rom, moving all extreities appropriately, no gross bony deformities or swelling.  Integumentary: warm, dry, no rashes, lacerations, Physical Exam    Vital Signs: I have reviewed the initial vital signs.  Constitutional: well-nourished, appears stated age, no acute distress  Eyes: Conjunctiva pink, Sclera clear,  Cardiovascular: S1 and S2, regular rate, regular rhythm, well-perfused extremities, radial pulses equal and 2+, calves nonttp, equal in size  Respiratory: unlabored respiratory effort, speaking in full sentences, handling oral secretions, clear to auscultation bilaterally no wheezing, rales and rhonchi  Musculoskeletal: supple neck, no lower extremity edema, no midline tenderness, paraspinal tenderness, clavicular creptius, painful rom, moving all extremities appropriately, no gross bony deformities or swelling.  Integumentary: warm, dry, no rashes, lacerations,

## 2021-07-05 NOTE — ED PROVIDER NOTE - PROGRESS NOTE DETAILS
patient agrees to ekg only which is normal refuses and further workup at this time now asymptomatic return precautions given to pt if palpitations return. Pt instructed to begin abx sent for uti as she has not yet begun it from uti dx on july 3rd.

## 2021-07-05 NOTE — ED PROVIDER NOTE - OBJECTIVE STATEMENT
71 y.o. female known hx atrial tachycardia, compliant with diltizaem presenting for palpitations of 2:30 that were constant for nearly 20 mins. Relieved with water. Upon EMS arrivqal tachycardia resolved but pt felt as if she was shaking. No fever, cp, sob, cough, HA, lightheadedness ,dizziness, trouble ambulating. Was dx with UTI on july 3rd , has NOT begun rx of macrobid yet. Pt also notes her synthroid rx was changed and decreased today. 71 y.o. female known hx atrial tachycardia, compliant with diltizaem presenting for palpitations of 2:30 that were constant for nearly 20 mins. Relieved with water. Upon EMS arrival tachycardia resolved but pt felt as if she was shaking. No fever, cp, sob, cough, HA, lightheadedness ,dizziness, trouble ambulating. Was dx with UTI on july 3rd , has NOT begun rx of macrobid yet. Pt also notes her synthroid rx was changed and decreased today.

## 2021-07-05 NOTE — ED PROVIDER NOTE - ATTENDING CONTRIBUTION TO CARE
I was present for and supervised the key and critical aspects of the procedures performed during the care of the patient. patient presents for evaluation of palpitations she has had them in the past and this feels similar she is now asymptomatic she denies any chest pain or sob she denies any fevers or chills   on exam she is nc/at perrla eomi oropharynx clear cta b/l, rrr s1s2 noted abd-soft nt nd bs+ ext from with no focal deficits at this time   A/p- Paitent presetns with palpitations that have now resolved she has no cp or sob we obtained ekg and advised further workup with labs and cxr.  I will discharge with strict return precautions she is aware that she is not acting in her best interest. I was present for and supervised the key and critical aspects of the procedures performed during the care of the patient. patient presents for evaluation of palpitations she has had them in the past and this feels similar she is now asymptomatic she denies any chest pain or sob she denies any fevers or chills   on exam she is nc/at perrla eomi oropharynx clear cta b/l, rrr s1s2 noted abd-soft nt nd bs+ ext from with no focal deficits at this time   A/p- Emanuel presents with palpitations that have now resolved she has no cp or sob we obtained ekg and advised further workup with labs and cxr.  I will discharge with strict return precautions she is aware that she is not acting in her best interest.

## 2021-07-05 NOTE — ED ADULT NURSE NOTE - SCORE
Pa Munroe consents to a virtual check in-service on 11/18/2020. Patient understands and accepts the financial responsibility for any deductible, coinsurance, and/or co-pays associated with the service.     Jodi Orellana is a 64year old femal Fentanyl                CONFUSION, HYPOTENSION    Comment:Felt \"loopy\"  Clindamycin             DIARRHEA, NAUSEA ONLY    Comment:Rectal bleeding  Seasonal                Runny nose    Comment:seasonal   Past Medical History:   Diagnosis Date   • Abdo Binge frequency: Never      Comment: occasionally    Drug use: No       Results for orders placed or performed during the hospital encounter of 10/01/20   CBC, PLATELET; NO DIFFERENTIAL   Result Value Ref Range    WBC 11.4 (H) 4.0 - 11.0 x10(3) uL    RBC hyperplasia, apocrine metaplasia, microcysts, and microcalcifications.  -Unremarkable nipple and skin.     B.  Left breast, mastectomy:  -Benign breast tissue with stromal fibrosis, columnar cell change, usual ductal hyperplasia, microcysts, and microcalcif 5.0 cm anterior to posterior. The specimen is received with a 9.0 x 6.1 cm ellipse of tan, wrinkled, and unremarkable skin with a 5.0 cm areola and a 1.1 cm in diameter everted nipple. The specimen is received oriented with a stitch at the axillary tail. mastectomy    Orders Placed This Encounter      Pneumococcal 23, Adult (Pneumovax) (04684) (Dx V03.82/Z23)      MMR (51986) (Dx V06.4/Z23)      Meds & Refills for this Visit:  Requested Prescriptions      No prescriptions requested or ordered in this encou 1

## 2021-07-05 NOTE — ED PROVIDER NOTE - CLINICAL SUMMARY MEDICAL DECISION MAKING FREE TEXT BOX
patient does not want to pursue any workup at this time.  She states she is uncomfortable with the situation in the ED and refuses to stay. we wanted to pursue labs, including troponin and cxr but she does not want to stay she is aware of the consequences of the choices she is making.  we instructed patient to return for any further workup at this time.  have a low threshold for return in addition conversation was held with son who is also aware of consequences of choices

## 2021-07-05 NOTE — ED PROVIDER NOTE - PATIENT PORTAL LINK FT
You can access the FollowMyHealth Patient Portal offered by [facility] by registering at the following website: [email or web address]. By joining [employer]’s FollowMyHealth portal, you will also be able to view your health information using other applications (apps) compatible with our system.

## 2021-07-05 NOTE — ED PROVIDER NOTE - NSFOLLOWUPINSTRUCTIONS_ED_ALL_ED_FT
Make an appointment with your pcp within 1-3 days of your ER visit. Return if you develop similar symptoms.    .Heart Palpitations    WHAT YOU NEED TO KNOW:    Heart palpitations are feelings that your heart races, jumps, throbs, or flutters. You may feel extra beats, no beats for a short time, or skipped beats. You may have these feelings in your chest, throat, or neck. They may happen when you are sitting, standing, or lying. Heart palpitations may be frightening, but are usually not caused by a serious problem.     DISCHARGE INSTRUCTIONS:    Call 911 or have someone else call for any of the following:     You have any of the following signs of a heart attack:   Squeezing, pressure, or pain in your chest      You may also have any of the following:   Discomfort or pain in your back, neck, jaw, stomach, or arm      Shortness of breath      Nausea or vomiting      Lightheadedness or a sudden cold sweat      You have any of the following signs of a stroke:   Numbness or drooping on one side of your face       Weakness in an arm or leg      Confusion or difficulty speaking      Dizziness, a severe headache, or vision loss      You faint or lose consciousness.     Return to the emergency department if:     Your palpitations happen more often or get more intense.         Contact your healthcare provider if:     You have new or worsening swelling in your feet or ankles.      You have questions or concerns about your condition or care.    Follow up with your healthcare provider as directed: You may need to follow up with a cardiologist. You may need tests to check for heart problems that cause palpitations. Write down your questions so you remember to ask them during your visits.     Keep a record: Write down when your palpitations start and stop, what you were doing when they started, and your symptoms. Keep track of what you ate or drank within a few hours of your palpitations. Include anything that seemed to help your symptoms, such as lying down or holding your breath. This record will help you and your healthcare provider learn what triggers your palpitations. Bring this record with you to your follow up visits.    Help prevent heart palpitations:     Manage stress and anxiety. Find ways to relax such as listening to music, meditating, or doing yoga. Exercise can also help decrease stress and anxiety. Talk to someone you trust about your stress or anxiety. You can also talk to a therapist.       Get plenty of sleep every night. Ask your healthcare provider how much sleep you need each night.       Do not drink caffeine or alcohol. Caffeine and alcohol can make your palpitations worse. Caffeine is found in soda, coffee, tea, chocolate, and drinks that increase your energy.       Do not smoke. Nicotine and other chemicals in cigarettes and cigars may damage your heart and blood vessels. Ask your healthcare provider for information if you currently smoke and need help to quit. E-cigarettes or smokeless tobacco still contain nicotine. Talk to your healthcare provider before you use these products.       Do not use illegal drugs. Talk to your healthcare provider if you use illegal drugs and want help to quit.          © Copyright [employer] 2019 All illustrations and images included in CareNotes are the copyrighted property of [employer] or [employer].

## 2021-07-20 ENCOUNTER — APPOINTMENT (OUTPATIENT)
Dept: OTOLARYNGOLOGY | Facility: CLINIC | Age: 71
End: 2021-07-20

## 2021-08-25 NOTE — ED ADULT NURSE NOTE - PAIN RATING/NUMBER SCALE (0-10): REST
08/25/21 0838   Mental Status   Mental Status Assessment X   Affect Flat;Withdrawn;Sad;Isolative;Depressed   Behavior Poor eye contact   Insight Poor   Judgement Poor   Reliability Appears to minimize   Mood Depressed   Nursing Suicide Assessment Note - Inpatient    Current assessment:    Current C-SSRS score: Negative screen= no ideation, behaviors or history      Protective Factors / Reason for Living: Positive therapeutic relationships    Interventions:   Maintain current plan of care.    Perform 15 minute safety checks, maintain suicide precautions.  Patient seen resting in bed during morning assessment. Patient denies suicidal thoughts but reports feeling depressed this morning. Patient reported wanting to talk to the doctor about the same. Patient denies homicidal thoughts or thoughts to harm others. Patient took scheduled medications without difficulty. Patient reported poor sleep the night prior despite taking prn trazodone. Patient plans to talk to the doctor about the same.  Writer will continue to monitor.      1

## 2021-10-27 NOTE — H&P ADULT - ASSESSMENT
----- Message from Sommer Pedersen DO sent at 10/27/2021  7:15 AM CDT -----  Thyroid okay.  Continue current dose of levothyroxine.Lipids:  Triglycerides elevated, HDL borderline low and LDL elevated.  Is she taking her Zetia and Crestor?Basic chemistry [facility] okay   70 Y/O F with PMH of ETHAN on home CPAP, Hypothyroid, GERD, Migraine, Palpations , rheumatic fever, MVP, hypertension? presenting s/p mechanical fall for syncope workup. she also found to have comminuted right humeral head and neck fracture.      # comminuted right humeral head and neck fracture. s/p mechanical fall  -pt denied any hx of syncope  -denied any Sx before or after falling  -trauma workup is non remarkable except for CT R shoulder that  showed  comminuted right humeral head and neck fracture  -will do orthostatics  -f/u echo  -f/u EEG  -repeat troponins, EKG  -nausea and vomiting after fentanyl oxycodone diluaded and morphine. she said she tolerated Toradol     #hypokalemia-  replete   -follow up AM BMP    #hypertension  -on thiazide, confirm with pt for dosing    #anxiety   -0.25 xanax  -benadryl 25mg before bedtime    #ETHAN on home CPAP  -asked to bring home CPAP  -following with  outpatient    #hypothyroid  -on synthroid 134    #GERD/ GI prophylaxis   -protonix    #migraine  -on bultibal at home    #hx of palpations  -as per pt she had PAC, PVC    #Hx of rheumatic fever    #DVT prophylaxis, Lovenox   #GI  prophylaxis  #dispo from home  #Diet DASH  #activity as tolerated 68 Y/O F with PMH of ETHAN on home CPAP, Hypothyroid, GERD, Migraine, Palpations , rheumatic fever, MVP, hypertension? presenting s/p mechanical fall for syncope workup. she also found to have comminuted right humeral head and neck fracture.      # comminuted right humeral head and neck fracture. s/p mechanical fall  -pt denied any hx of syncope  -denied any Sx before or after falling  -trauma workup is non remarkable except for CT R shoulder that  showed  comminuted right humeral head and neck fracture  -ortho following NWB, luis fernando, fu as op 1 week [phone] to discuss op verses nonop treatment   -will order orthostatics  -f/u echo  -f/u EEG  -repeat troponins, EKG  -nausea and vomiting after fentanyl oxycodone diluaded and morphine. she said she tolerated Toradol     #hypokalemia-  replete   -follow up AM BMP    #hypertension  -on thiazide, confirm with pt for dosing    #anxiety   -0.25 xanax  -benadryl 25mg before bedtime    #ETHAN on home CPAP  -asked to bring home CPAP  -following with  outpatient    #hypothyroid  -on synthroid 134    #GERD/ GI prophylaxis   -protonix    #migraine  -on bultibal at home    #hx of palpations  -as per pt she had PAC, PVC    #Hx of rheumatic fever    #DVT prophylaxis, Lovenox   #GI  prophylaxis  #dispo from home  #Diet DASH  #activity as tolerated 68 Y/O F with PMH of ETHAN on home CPAP, Hypothyroid, GERD, Migraine, Palpations , rheumatic fever, MVP, hypertension? presenting s/p mechanical fall for syncope workup. she also found to have comminuted right humeral head and neck fracture.      # comminuted right humeral head and neck fracture. s/p mechanical fall  -pt denied any hx of syncope  -denied any Sx before or after falling  -trauma workup is non remarkable except for CT R shoulder that  showed  comminuted right humeral head and neck fracture  -ortho following NWB, luis fernando, fu as op 1 week [phone] to discuss op verses nonop treatment   -will order orthostatics  -f/u echo  -f/u EEG  -repeat troponins, EKG  -nausea and vomiting after fentanyl, oxycodone, Dilaudid and morphine. she said she tolerated Toradol well.    #hypokalemia-  replete   -follow up AM BMP    #hypertension  -on thiazide, confirm with pt for dosing  -will hold for now    #anxiety   -0.25 xanax  -benadryl 25mg before bedtime    #ETHAN on home CPAP  -asked to bring home CPAP  -following with  outpatient    #hypothyroid  -on synthroid 134    #GERD/ GI prophylaxis   -protonix    #migraine  -on bultibal at home    #hx of palpations  -as per pt she had PAC, PVC    #Hx of rheumatic fever    #DVT prophylaxis, Lovenox   #GI  prophylaxis  #dispo from home  #Diet DASH  #activity as tolerated

## 2021-11-05 ENCOUNTER — INPATIENT (INPATIENT)
Facility: HOSPITAL | Age: 71
LOS: 2 days | Discharge: HOME | End: 2021-11-08
Attending: STUDENT IN AN ORGANIZED HEALTH CARE EDUCATION/TRAINING PROGRAM | Admitting: STUDENT IN AN ORGANIZED HEALTH CARE EDUCATION/TRAINING PROGRAM
Payer: MEDICARE

## 2021-11-05 VITALS
HEART RATE: 74 BPM | RESPIRATION RATE: 18 BRPM | WEIGHT: 205.91 LBS | DIASTOLIC BLOOD PRESSURE: 85 MMHG | HEIGHT: 68 IN | TEMPERATURE: 97 F | OXYGEN SATURATION: 99 % | SYSTOLIC BLOOD PRESSURE: 160 MMHG

## 2021-11-05 DIAGNOSIS — Z98.42 CATARACT EXTRACTION STATUS, LEFT EYE: Chronic | ICD-10-CM

## 2021-11-05 DIAGNOSIS — Z98.891 HISTORY OF UTERINE SCAR FROM PREVIOUS SURGERY: Chronic | ICD-10-CM

## 2021-11-05 DIAGNOSIS — Z98.890 OTHER SPECIFIED POSTPROCEDURAL STATES: Chronic | ICD-10-CM

## 2021-11-05 LAB
ALBUMIN SERPL ELPH-MCNC: 4.4 G/DL — SIGNIFICANT CHANGE UP (ref 3.5–5.2)
ALP SERPL-CCNC: 106 U/L — SIGNIFICANT CHANGE UP (ref 30–115)
ALT FLD-CCNC: 21 U/L — SIGNIFICANT CHANGE UP (ref 0–41)
ANION GAP SERPL CALC-SCNC: 11 MMOL/L — SIGNIFICANT CHANGE UP (ref 7–14)
AST SERPL-CCNC: 22 U/L — SIGNIFICANT CHANGE UP (ref 0–41)
BASOPHILS # BLD AUTO: 0.02 K/UL — SIGNIFICANT CHANGE UP (ref 0–0.2)
BASOPHILS NFR BLD AUTO: 0.2 % — SIGNIFICANT CHANGE UP (ref 0–1)
BILIRUB SERPL-MCNC: <0.2 MG/DL — SIGNIFICANT CHANGE UP (ref 0.2–1.2)
BUN SERPL-MCNC: 21 MG/DL — HIGH (ref 10–20)
CALCIUM SERPL-MCNC: 9.3 MG/DL — SIGNIFICANT CHANGE UP (ref 8.5–10.1)
CHLORIDE SERPL-SCNC: 95 MMOL/L — LOW (ref 98–110)
CO2 SERPL-SCNC: 29 MMOL/L — SIGNIFICANT CHANGE UP (ref 17–32)
CREAT SERPL-MCNC: 0.8 MG/DL — SIGNIFICANT CHANGE UP (ref 0.7–1.5)
EOSINOPHIL # BLD AUTO: 0.26 K/UL — SIGNIFICANT CHANGE UP (ref 0–0.7)
EOSINOPHIL NFR BLD AUTO: 2.5 % — SIGNIFICANT CHANGE UP (ref 0–8)
GLUCOSE SERPL-MCNC: 112 MG/DL — HIGH (ref 70–99)
HCT VFR BLD CALC: 38.9 % — SIGNIFICANT CHANGE UP (ref 37–47)
HGB BLD-MCNC: 12.9 G/DL — SIGNIFICANT CHANGE UP (ref 12–16)
IMM GRANULOCYTES NFR BLD AUTO: 0.3 % — SIGNIFICANT CHANGE UP (ref 0.1–0.3)
LIDOCAIN IGE QN: 53 U/L — SIGNIFICANT CHANGE UP (ref 7–60)
LYMPHOCYTES # BLD AUTO: 3.55 K/UL — HIGH (ref 1.2–3.4)
LYMPHOCYTES # BLD AUTO: 33.6 % — SIGNIFICANT CHANGE UP (ref 20.5–51.1)
MCHC RBC-ENTMCNC: 29.9 PG — SIGNIFICANT CHANGE UP (ref 27–31)
MCHC RBC-ENTMCNC: 33.2 G/DL — SIGNIFICANT CHANGE UP (ref 32–37)
MCV RBC AUTO: 90 FL — SIGNIFICANT CHANGE UP (ref 81–99)
MONOCYTES # BLD AUTO: 1.12 K/UL — HIGH (ref 0.1–0.6)
MONOCYTES NFR BLD AUTO: 10.6 % — HIGH (ref 1.7–9.3)
NEUTROPHILS # BLD AUTO: 5.6 K/UL — SIGNIFICANT CHANGE UP (ref 1.4–6.5)
NEUTROPHILS NFR BLD AUTO: 52.8 % — SIGNIFICANT CHANGE UP (ref 42.2–75.2)
NRBC # BLD: 0 /100 WBCS — SIGNIFICANT CHANGE UP (ref 0–0)
PLATELET # BLD AUTO: 237 K/UL — SIGNIFICANT CHANGE UP (ref 130–400)
POTASSIUM SERPL-MCNC: 3.8 MMOL/L — SIGNIFICANT CHANGE UP (ref 3.5–5)
POTASSIUM SERPL-SCNC: 3.8 MMOL/L — SIGNIFICANT CHANGE UP (ref 3.5–5)
PROT SERPL-MCNC: 7.2 G/DL — SIGNIFICANT CHANGE UP (ref 6–8)
RBC # BLD: 4.32 M/UL — SIGNIFICANT CHANGE UP (ref 4.2–5.4)
RBC # FLD: 13.8 % — SIGNIFICANT CHANGE UP (ref 11.5–14.5)
SARS-COV-2 RNA SPEC QL NAA+PROBE: SIGNIFICANT CHANGE UP
SODIUM SERPL-SCNC: 135 MMOL/L — SIGNIFICANT CHANGE UP (ref 135–146)
TROPONIN T SERPL-MCNC: <0.01 NG/ML — SIGNIFICANT CHANGE UP
WBC # BLD: 10.58 K/UL — SIGNIFICANT CHANGE UP (ref 4.8–10.8)
WBC # FLD AUTO: 10.58 K/UL — SIGNIFICANT CHANGE UP (ref 4.8–10.8)

## 2021-11-05 PROCEDURE — 99285 EMERGENCY DEPT VISIT HI MDM: CPT

## 2021-11-05 PROCEDURE — 99223 1ST HOSP IP/OBS HIGH 75: CPT | Mod: AI

## 2021-11-05 PROCEDURE — 76705 ECHO EXAM OF ABDOMEN: CPT | Mod: 26

## 2021-11-05 PROCEDURE — 93010 ELECTROCARDIOGRAM REPORT: CPT

## 2021-11-05 RX ORDER — DILTIAZEM HCL 120 MG
180 CAPSULE, EXT RELEASE 24 HR ORAL DAILY
Refills: 0 | Status: DISCONTINUED | OUTPATIENT
Start: 2021-11-05 | End: 2021-11-08

## 2021-11-05 RX ORDER — ONDANSETRON 8 MG/1
4 TABLET, FILM COATED ORAL ONCE
Refills: 0 | Status: COMPLETED | OUTPATIENT
Start: 2021-11-05 | End: 2021-11-05

## 2021-11-05 RX ORDER — ALPRAZOLAM 0.25 MG
0.25 TABLET ORAL AT BEDTIME
Refills: 0 | Status: DISCONTINUED | OUTPATIENT
Start: 2021-11-05 | End: 2021-11-08

## 2021-11-05 RX ORDER — CEFTRIAXONE 500 MG/1
1000 INJECTION, POWDER, FOR SOLUTION INTRAMUSCULAR; INTRAVENOUS EVERY 24 HOURS
Refills: 0 | Status: DISCONTINUED | OUTPATIENT
Start: 2021-11-05 | End: 2021-11-08

## 2021-11-05 RX ORDER — CHOLECALCIFEROL (VITAMIN D3) 125 MCG
2000 CAPSULE ORAL DAILY
Refills: 0 | Status: DISCONTINUED | OUTPATIENT
Start: 2021-11-05 | End: 2021-11-08

## 2021-11-05 RX ORDER — PANTOPRAZOLE SODIUM 20 MG/1
40 TABLET, DELAYED RELEASE ORAL
Refills: 0 | Status: DISCONTINUED | OUTPATIENT
Start: 2021-11-05 | End: 2021-11-08

## 2021-11-05 RX ORDER — ALPRAZOLAM 0.25 MG
0.5 TABLET ORAL ONCE
Refills: 0 | Status: DISCONTINUED | OUTPATIENT
Start: 2021-11-05 | End: 2021-11-05

## 2021-11-05 RX ORDER — HYDROMORPHONE HYDROCHLORIDE 2 MG/ML
0.5 INJECTION INTRAMUSCULAR; INTRAVENOUS; SUBCUTANEOUS ONCE
Refills: 0 | Status: DISCONTINUED | OUTPATIENT
Start: 2021-11-05 | End: 2021-11-05

## 2021-11-05 RX ORDER — ONDANSETRON 8 MG/1
4 TABLET, FILM COATED ORAL ONCE
Refills: 0 | Status: DISCONTINUED | OUTPATIENT
Start: 2021-11-05 | End: 2021-11-08

## 2021-11-05 RX ORDER — FAMOTIDINE 10 MG/ML
20 INJECTION INTRAVENOUS ONCE
Refills: 0 | Status: COMPLETED | OUTPATIENT
Start: 2021-11-05 | End: 2021-11-05

## 2021-11-05 RX ORDER — LEVOTHYROXINE SODIUM 125 MCG
137 TABLET ORAL DAILY
Refills: 0 | Status: DISCONTINUED | OUTPATIENT
Start: 2021-11-05 | End: 2021-11-08

## 2021-11-05 RX ORDER — CEFTRIAXONE 500 MG/1
1000 INJECTION, POWDER, FOR SOLUTION INTRAMUSCULAR; INTRAVENOUS ONCE
Refills: 0 | Status: COMPLETED | OUTPATIENT
Start: 2021-11-05 | End: 2021-11-05

## 2021-11-05 RX ORDER — HYDROCHLOROTHIAZIDE 25 MG
12.5 TABLET ORAL AT BEDTIME
Refills: 0 | Status: DISCONTINUED | OUTPATIENT
Start: 2021-11-05 | End: 2021-11-07

## 2021-11-05 RX ORDER — SODIUM CHLORIDE 9 MG/ML
1000 INJECTION INTRAMUSCULAR; INTRAVENOUS; SUBCUTANEOUS ONCE
Refills: 0 | Status: COMPLETED | OUTPATIENT
Start: 2021-11-05 | End: 2021-11-05

## 2021-11-05 RX ORDER — MORPHINE SULFATE 50 MG/1
2 CAPSULE, EXTENDED RELEASE ORAL ONCE
Refills: 0 | Status: DISCONTINUED | OUTPATIENT
Start: 2021-11-05 | End: 2021-11-05

## 2021-11-05 RX ORDER — SODIUM CHLORIDE 9 MG/ML
1000 INJECTION INTRAMUSCULAR; INTRAVENOUS; SUBCUTANEOUS
Refills: 0 | Status: DISCONTINUED | OUTPATIENT
Start: 2021-11-05 | End: 2021-11-08

## 2021-11-05 RX ORDER — MORPHINE SULFATE 50 MG/1
2 CAPSULE, EXTENDED RELEASE ORAL EVERY 4 HOURS
Refills: 0 | Status: DISCONTINUED | OUTPATIENT
Start: 2021-11-05 | End: 2021-11-08

## 2021-11-05 RX ORDER — ENOXAPARIN SODIUM 100 MG/ML
80 INJECTION SUBCUTANEOUS DAILY
Refills: 0 | Status: DISCONTINUED | OUTPATIENT
Start: 2021-11-05 | End: 2021-11-05

## 2021-11-05 RX ADMIN — ONDANSETRON 4 MILLIGRAM(S): 8 TABLET, FILM COATED ORAL at 20:53

## 2021-11-05 RX ADMIN — ONDANSETRON 4 MILLIGRAM(S): 8 TABLET, FILM COATED ORAL at 22:21

## 2021-11-05 RX ADMIN — FAMOTIDINE 104 MILLIGRAM(S): 10 INJECTION INTRAVENOUS at 19:41

## 2021-11-05 RX ADMIN — Medication 30 MILLILITER(S): at 19:41

## 2021-11-05 RX ADMIN — CEFTRIAXONE 100 MILLIGRAM(S): 500 INJECTION, POWDER, FOR SOLUTION INTRAMUSCULAR; INTRAVENOUS at 22:29

## 2021-11-05 RX ADMIN — MORPHINE SULFATE 2 MILLIGRAM(S): 50 CAPSULE, EXTENDED RELEASE ORAL at 20:53

## 2021-11-05 RX ADMIN — Medication 20 MILLIGRAM(S): at 19:45

## 2021-11-05 RX ADMIN — SODIUM CHLORIDE 1000 MILLILITER(S): 9 INJECTION INTRAMUSCULAR; INTRAVENOUS; SUBCUTANEOUS at 19:42

## 2021-11-05 RX ADMIN — Medication 0.5 MILLIGRAM(S): at 21:49

## 2021-11-05 NOTE — H&P ADULT - NSICDXFAMILYHX_GEN_ALL_CORE_FT
FAMILY HISTORY:  Father  Still living? No  Family history of CKD (chronic kidney disease), Age at diagnosis: Age Unknown    Mother  Still living? Unknown  Family history of breast cancer in mother, Age at diagnosis: Age Unknown

## 2021-11-05 NOTE — ED PROVIDER NOTE - OBJECTIVE STATEMENT
72 yo female hx of HTN/hiatal hernia present c/o upper abdominal pain radiating to her upper back since 1 pm after eating beans soap. pain associates with nausea and dry heaving. took pepto and gas-X without relief. had similar episode x 1 few months ago but resolved without intervention. Denies fever/chill/diarrhea/change of appetite/constipation and urinary sxs. Denies HA/dizziness/chest pain/sob/palpitations and pain to extremities and ambulatory difficulty.

## 2021-11-05 NOTE — ED PROVIDER NOTE - CARE PLAN
1 Principal Discharge DX:	Acute epigastric pain  Secondary Diagnosis:	Intractable abdominal pain  Secondary Diagnosis:	Cholecystitis

## 2021-11-05 NOTE — ED PROVIDER NOTE - ATTENDING CONTRIBUTION TO CARE
I was present for and supervised the key and critical aspects of the procedures performed during the care of the patient. ATTENDING NOTE: 72 y/o F PMH MVP, HTN, and hypothyroidism presents to the ED c/o L upper ABD pain, stabbing in nature, that began approximately 2hrs PTA. Pt states that the pain has been progressively worsening since, but she has had no nausea, vomiting, diarrhea, chest pain or back pain. Pt notes she had a similar episode in the past after she ate beans but at that time, symptoms were relieved with pepto bismol. Pt does endorse that the pain started today 2hrs after consuming beans. No relief with pepto today. On exam: NCAT. Lungs CTAB. RRR, S1S2 noted. Radial pulses 2+ and equal, pedal pulses 2+ and equal. Abdomen soft, NT/(+) Mildly distended, no rebound or guarding. FROM x4 extremities. No focal neuro deficits. Plan: IVF, labs, imaging, symptomatic care and reassess.

## 2021-11-05 NOTE — ED PROVIDER NOTE - PROGRESS NOTE DETAILS
ATTENDING NOTE: 70 y/o F PMH MVP, HTN, and hypothyroidism presents to the ED c/o L upper ABD pain, stabbing in nature, that began approximately 2hrs PTA. Pt states that the pain has been progressively worsening since, but she has had no nausea, vomiting, diarrhea, chest pain or back pain. Pt notes she had a similar episode in the past after she ate beans but at that time, symptoms were relieved with pepto bismol. Pt does endorse that the pain started today 2hrs after consuming beans. No relief with pepto today. On exam: NCAT. Lungs CTAB. RRR, S1S2 noted. Radial pulses 2+ and equal, pedal pulses 2+ and equal. Abdomen soft, NT/(+) Mildly distended, no rebound or guarding. FROM x4 extremities. No focal neuro deficits. Plan: IVF, labs, imaging, symptomatic care and reassess. Surgery NESHA Azevedo evaluated patient, patient lying toward more medical management over surgery. admit to medicine. surgery continue to follow patient on floor

## 2021-11-05 NOTE — H&P ADULT - NSHPLABSRESULTS_GEN_ALL_CORE
sono GB  GALLBLADDER/BILIARY TREE: The gallbladder is mildly distended. Cholelithiasis with borderline gallbladder wall thickening; gallbladder wall measures up to 3 mm. No pericholecystic fluid. Reportedly positive sonographic Bello's sign. No intrahepatic biliary ductal dilatation. The common bile duct measures 3 mm, which is normal.    PANCREAS:  Visualized head and body are unremarkable. Remainder obscured by overlying bowel gas.    KIDNEY:  Right kidney measures 10.3 cm in length. No hydronephrosis, calculi or solid mass.    AORTA/IVC:  Visualized proximal portions unremarkable.    ASCITES:  None.    IMPRESSION:    Cholelithiasis, mild gallbladder distention, and reportedly positive sonographic Bello's sign. Findings are suspicious for acute cholecystitis.  `                          12.9   10.58 )-----------( 237      ( 05 Nov 2021 19:32 )             38.9       11-05    135  |  95<L>  |  21<H>  ----------------------------<  112<H>  3.8   |  29  |  0.8    Ca    9.3      05 Nov 2021 19:32    TPro  7.2  /  Alb  4.4  /  TBili  <0.2  /  DBili  x   /  AST  22  /  ALT  21  /  AlkPhos  106  11-05                      Lactate Trend      CARDIAC MARKERS ( 05 Nov 2021 19:32 )  x     / <0.01 ng/mL / x     / x     / x            CAPILLARY BLOOD GLUCOSE 12.9   10.58 )-----------( 237      ( 05 Nov 2021 19:32 )             38.9         11-05    135  |  95<L>  |  21<H>  ----------------------------<  112<H>  3.8   |  29  |  0.8    Ca    9.3      05 Nov 2021 19:32    TPro  7.2  /  Alb  4.4  /  TBili  <0.2  /  DBili  x   /  AST  22  /  ALT  21  /  AlkPhos  106  11-05        US Abdomen Limited (11.05.21): Cholelithiasis, mild gallbladder distention, and reportedly positive sonographic Bello's sign.

## 2021-11-05 NOTE — CONSULT NOTE ADULT - ASSESSMENT
Cholelithiasis  R/O ACUTE  CHOLECYSTITIS  NPO  IVF  IV  ABX   PAIN MGMT  HIDA  SCAN  DVT/  GI PROPHYLAXIS  WILL FOLLOW

## 2021-11-05 NOTE — H&P ADULT - NSHPPHYSICALEXAM_GEN_ALL_CORE
GENERAL:  70y/o Female NAD, resting comfortably.  HEAD:  Atraumatic, Normocephalic  EYES: EOMI, PERRLA, conjunctiva and sclera clear  NECK: Supple, No JVD, no cervical lymphadenopathy, non-tender  CHEST/LUNG: Clear to auscultation bilaterally; No wheeze, rhonchi, or rales  HEART: Regular rate and rhythm; S1&S2  ABDOMEN: Soft, + tenderness to palpation in epigastric area, Nondistended x 4 quadrants; Bowel sounds present  EXTREMITIES:   Peripheral Pulses Present, No clubbing, no cyanosis, or no edema, no calf tenderness  PSYCH: AAOx3, cooperative, appropriate  NEUROLOGY: WNL  SKIN: WNL

## 2021-11-05 NOTE — H&P ADULT - HISTORY OF PRESENT ILLNESS
· Subjective and Objective:   · Chief Complaint: The patient is a 71y Female complaining of abdominal pain.  · HPI Objective Statement: 70 yo female hx of HTN/hiatal hernia, gerd, asthma, wilma on cpap, hypothyroid, mitral valve prolapse, migraine  ha,  present c/o upper abdominal pain radiating to her upper back since 1 pm after eating beans soap.Last PO intake was around 430 pm she ate a banana.Pt states that the  pain is 9/10 continuous associates with nausea and dry heaving.  . Denies fever/chill/diarrhea/change of appetite/constipation and urinary sxs. Denies HA/dizziness/chest pain/sob/palpitations and pain to extremities and ambulatory difficulty.  Pt is a full code.Pt also adds that she had a PE in 2020 after a upper extremitie surgery but is off Eliquis since January2021.  Pt came with her BIPAP machine from home at bed side, is vomiting on and off.   71 yr female hx of HTN, hiatal hernia, GERD, asthma, ETHAN on cpap, hypothyroid, mitral valve prolapse, Migraine  present c/o upper abdominal pain radiating to her upper back since 1 pm after eating bean soup. Last PO intake was around 430 pm she ate a banana. Pt states that the  pain is 9/10 continuous associates with nausea and dry heaving.  Denies fever/chill/diarrhea/change of appetite/constipation and urinary sxs.     Pt also adds that she had a PE in 2020 after a upper extremity surgery but is off Eliquis since January2021.  Pt came with her BIPAP machine from home at bed side, is vomiting on and off.

## 2021-11-05 NOTE — ED PROVIDER NOTE - CLINICAL SUMMARY MEDICAL DECISION MAKING FREE TEXT BOX
patient given iv fluids iv pain medication iv nausea medications we obtained ekg labs as well as ruq us   patient improved.  not consistent with acs in addition patient afebrile unlikely to be infectious at this time patient given iv fluids iv pain medication iv nausea medications we obtained ekg labs as well as ruq us   patient improved.  not consistent with acs in addition patient afebrile unlikely to be infectious at this time  patient found to have cholecystitis we have consulted surgery who advise medical admission at this time patient updated and  agrees with the plan of care

## 2021-11-05 NOTE — ED PROVIDER NOTE - PHYSICAL EXAMINATION
CONSTITUTIONAL: Well-appearing; well-nourished; in no apparent distress.   EYES: PERRL; EOM intact.   CARDIOVASCULAR: Normal S1, S2; no murmurs, rubs, or gallops.   RESPIRATORY: Normal chest excursion with respiration; breath sounds clear and equal bilaterally; no wheezes, rhonchi, or rales.  GI/: + epigastric and RUQ tenderness. Normal bowel sounds; non-distended; no palpable organomegaly.   MS: No calf swelling and tenderness.  SKIN: Normal for age and race; warm; dry; good turgor; no apparent lesions or exudate.   NEURO/PSYCH: A & O x 4; grossly unremarkable.

## 2021-11-05 NOTE — H&P ADULT - NSHPSOCIALHISTORY_GEN_ALL_CORE
pt denies ever smoking, drugs or etoh. substance Use (street drugs): ( x ) never used  (  ) other:  Tobacco Usage:  ( x  ) never smoked   (   ) former smoker   (   ) current smoker  (     ) pack year  Alcohol Usage: None

## 2021-11-05 NOTE — H&P ADULT - ASSESSMENT
71 yr old female with hx of Atrial tachycardia, ETHAN on CPAP, COPD, hypothyroidism, HTN admitted for Epigastric Pain for r 1 day     # Acute Cholecystitis in setting of Cholelithiasis  - hemodynamically stable  - US Abdomen Limited (11.05.21): Cholelithiasis, mild gallbladder distention, and reportedly positive sonographic Bello's; sign. CBD 3mm   - Lipase 50  - NPO  - start NS@75  - start ceftriaxone and flagyl   - pain control  - Sx following     # HTN  - c/w HCTZ    # Atrial tachycardia  - rate controlled   - c/w Cardizem PO    # COPD   - stable    # ETHAN   - c/w CPAP    # Hx of PE (provoked)  - completed 3M Eliquis     # Hypothyroidism  - c/w levothyroxine     # GERD  - c/w Protonix     # DVT ppx  - start Lovenox      # Ambulate as tolerated     # Full code

## 2021-11-06 LAB
ALBUMIN SERPL ELPH-MCNC: 4 G/DL — SIGNIFICANT CHANGE UP (ref 3.5–5.2)
ALP SERPL-CCNC: 98 U/L — SIGNIFICANT CHANGE UP (ref 30–115)
ALT FLD-CCNC: 18 U/L — SIGNIFICANT CHANGE UP (ref 0–41)
ANION GAP SERPL CALC-SCNC: 15 MMOL/L — HIGH (ref 7–14)
APPEARANCE UR: CLEAR — SIGNIFICANT CHANGE UP
AST SERPL-CCNC: 25 U/L — SIGNIFICANT CHANGE UP (ref 0–41)
BASOPHILS # BLD AUTO: 0.01 K/UL — SIGNIFICANT CHANGE UP (ref 0–0.2)
BASOPHILS NFR BLD AUTO: 0.1 % — SIGNIFICANT CHANGE UP (ref 0–1)
BILIRUB DIRECT SERPL-MCNC: <0.2 MG/DL — SIGNIFICANT CHANGE UP (ref 0–0.2)
BILIRUB INDIRECT FLD-MCNC: SIGNIFICANT CHANGE UP MG/DL (ref 0.2–1.2)
BILIRUB SERPL-MCNC: <0.2 MG/DL — SIGNIFICANT CHANGE UP (ref 0.2–1.2)
BILIRUB UR-MCNC: NEGATIVE — SIGNIFICANT CHANGE UP
BUN SERPL-MCNC: 14 MG/DL — SIGNIFICANT CHANGE UP (ref 10–20)
CALCIUM SERPL-MCNC: 8.6 MG/DL — SIGNIFICANT CHANGE UP (ref 8.5–10.1)
CHLORIDE SERPL-SCNC: 100 MMOL/L — SIGNIFICANT CHANGE UP (ref 98–110)
CO2 SERPL-SCNC: 22 MMOL/L — SIGNIFICANT CHANGE UP (ref 17–32)
COLOR SPEC: YELLOW — SIGNIFICANT CHANGE UP
CREAT SERPL-MCNC: 0.6 MG/DL — LOW (ref 0.7–1.5)
DIFF PNL FLD: NEGATIVE — SIGNIFICANT CHANGE UP
EOSINOPHIL # BLD AUTO: 0 K/UL — SIGNIFICANT CHANGE UP (ref 0–0.7)
EOSINOPHIL NFR BLD AUTO: 0 % — SIGNIFICANT CHANGE UP (ref 0–8)
GLUCOSE SERPL-MCNC: 99 MG/DL — SIGNIFICANT CHANGE UP (ref 70–99)
GLUCOSE UR QL: NEGATIVE MG/DL — SIGNIFICANT CHANGE UP
HCT VFR BLD CALC: 37.3 % — SIGNIFICANT CHANGE UP (ref 37–47)
HGB BLD-MCNC: 12.5 G/DL — SIGNIFICANT CHANGE UP (ref 12–16)
IMM GRANULOCYTES NFR BLD AUTO: 0.3 % — SIGNIFICANT CHANGE UP (ref 0.1–0.3)
KETONES UR-MCNC: NEGATIVE — SIGNIFICANT CHANGE UP
LEUKOCYTE ESTERASE UR-ACNC: NEGATIVE — SIGNIFICANT CHANGE UP
LIDOCAIN IGE QN: 21 U/L — SIGNIFICANT CHANGE UP (ref 7–60)
LIDOCAIN IGE QN: 22 U/L — SIGNIFICANT CHANGE UP (ref 7–60)
LYMPHOCYTES # BLD AUTO: 1.32 K/UL — SIGNIFICANT CHANGE UP (ref 1.2–3.4)
LYMPHOCYTES # BLD AUTO: 10.8 % — LOW (ref 20.5–51.1)
MAGNESIUM SERPL-MCNC: 2 MG/DL — SIGNIFICANT CHANGE UP (ref 1.8–2.4)
MCHC RBC-ENTMCNC: 30.1 PG — SIGNIFICANT CHANGE UP (ref 27–31)
MCHC RBC-ENTMCNC: 33.5 G/DL — SIGNIFICANT CHANGE UP (ref 32–37)
MCV RBC AUTO: 89.9 FL — SIGNIFICANT CHANGE UP (ref 81–99)
MONOCYTES # BLD AUTO: 0.93 K/UL — HIGH (ref 0.1–0.6)
MONOCYTES NFR BLD AUTO: 7.6 % — SIGNIFICANT CHANGE UP (ref 1.7–9.3)
NEUTROPHILS # BLD AUTO: 9.89 K/UL — HIGH (ref 1.4–6.5)
NEUTROPHILS NFR BLD AUTO: 81.2 % — HIGH (ref 42.2–75.2)
NITRITE UR-MCNC: NEGATIVE — SIGNIFICANT CHANGE UP
NRBC # BLD: 0 /100 WBCS — SIGNIFICANT CHANGE UP (ref 0–0)
PH UR: 7.5 — SIGNIFICANT CHANGE UP (ref 5–8)
PLATELET # BLD AUTO: 209 K/UL — SIGNIFICANT CHANGE UP (ref 130–400)
POTASSIUM SERPL-MCNC: 4.5 MMOL/L — SIGNIFICANT CHANGE UP (ref 3.5–5)
POTASSIUM SERPL-SCNC: 4.5 MMOL/L — SIGNIFICANT CHANGE UP (ref 3.5–5)
PROT SERPL-MCNC: 6.5 G/DL — SIGNIFICANT CHANGE UP (ref 6–8)
PROT UR-MCNC: NEGATIVE MG/DL — SIGNIFICANT CHANGE UP
RBC # BLD: 4.15 M/UL — LOW (ref 4.2–5.4)
RBC # FLD: 13.9 % — SIGNIFICANT CHANGE UP (ref 11.5–14.5)
SODIUM SERPL-SCNC: 137 MMOL/L — SIGNIFICANT CHANGE UP (ref 135–146)
SP GR SPEC: 1.01 — SIGNIFICANT CHANGE UP (ref 1.01–1.03)
UROBILINOGEN FLD QL: 0.2 MG/DL — SIGNIFICANT CHANGE UP
WBC # BLD: 12.19 K/UL — HIGH (ref 4.8–10.8)
WBC # FLD AUTO: 12.19 K/UL — HIGH (ref 4.8–10.8)

## 2021-11-06 PROCEDURE — 99223 1ST HOSP IP/OBS HIGH 75: CPT

## 2021-11-06 PROCEDURE — 99232 SBSQ HOSP IP/OBS MODERATE 35: CPT

## 2021-11-06 RX ORDER — METRONIDAZOLE 500 MG
500 TABLET ORAL ONCE
Refills: 0 | Status: COMPLETED | OUTPATIENT
Start: 2021-11-06 | End: 2021-11-06

## 2021-11-06 RX ORDER — ACETAMINOPHEN 500 MG
650 TABLET ORAL EVERY 6 HOURS
Refills: 0 | Status: DISCONTINUED | OUTPATIENT
Start: 2021-11-06 | End: 2021-11-08

## 2021-11-06 RX ORDER — POLYETHYLENE GLYCOL 3350 17 G/17G
17 POWDER, FOR SOLUTION ORAL
Refills: 0 | Status: DISCONTINUED | OUTPATIENT
Start: 2021-11-06 | End: 2021-11-08

## 2021-11-06 RX ORDER — ENOXAPARIN SODIUM 100 MG/ML
40 INJECTION SUBCUTANEOUS AT BEDTIME
Refills: 0 | Status: DISCONTINUED | OUTPATIENT
Start: 2021-11-06 | End: 2021-11-08

## 2021-11-06 RX ORDER — KETOROLAC TROMETHAMINE 30 MG/ML
15 SYRINGE (ML) INJECTION EVERY 8 HOURS
Refills: 0 | Status: DISCONTINUED | OUTPATIENT
Start: 2021-11-06 | End: 2021-11-08

## 2021-11-06 RX ORDER — CEFTRIAXONE 500 MG/1
1000 INJECTION, POWDER, FOR SOLUTION INTRAMUSCULAR; INTRAVENOUS EVERY 24 HOURS
Refills: 0 | Status: DISCONTINUED | OUTPATIENT
Start: 2021-11-06 | End: 2021-11-06

## 2021-11-06 RX ORDER — ONDANSETRON 8 MG/1
4 TABLET, FILM COATED ORAL EVERY 6 HOURS
Refills: 0 | Status: DISCONTINUED | OUTPATIENT
Start: 2021-11-06 | End: 2021-11-08

## 2021-11-06 RX ORDER — METRONIDAZOLE 500 MG
500 TABLET ORAL EVERY 8 HOURS
Refills: 0 | Status: DISCONTINUED | OUTPATIENT
Start: 2021-11-06 | End: 2021-11-08

## 2021-11-06 RX ORDER — METRONIDAZOLE 500 MG
TABLET ORAL
Refills: 0 | Status: DISCONTINUED | OUTPATIENT
Start: 2021-11-06 | End: 2021-11-08

## 2021-11-06 RX ORDER — OXYCODONE AND ACETAMINOPHEN 5; 325 MG/1; MG/1
1 TABLET ORAL EVERY 8 HOURS
Refills: 0 | Status: DISCONTINUED | OUTPATIENT
Start: 2021-11-06 | End: 2021-11-08

## 2021-11-06 RX ADMIN — Medication 100 MILLIGRAM(S): at 02:24

## 2021-11-06 RX ADMIN — Medication 15 MILLIGRAM(S): at 02:42

## 2021-11-06 RX ADMIN — Medication 0.25 MILLIGRAM(S): at 22:32

## 2021-11-06 RX ADMIN — Medication 100 MILLIGRAM(S): at 14:53

## 2021-11-06 RX ADMIN — CEFTRIAXONE 100 MILLIGRAM(S): 500 INJECTION, POWDER, FOR SOLUTION INTRAMUSCULAR; INTRAVENOUS at 03:52

## 2021-11-06 RX ADMIN — Medication 650 MILLIGRAM(S): at 22:33

## 2021-11-06 RX ADMIN — Medication 650 MILLIGRAM(S): at 11:20

## 2021-11-06 RX ADMIN — PANTOPRAZOLE SODIUM 40 MILLIGRAM(S): 20 TABLET, DELAYED RELEASE ORAL at 06:31

## 2021-11-06 RX ADMIN — Medication 180 MILLIGRAM(S): at 06:32

## 2021-11-06 RX ADMIN — ENOXAPARIN SODIUM 40 MILLIGRAM(S): 100 INJECTION SUBCUTANEOUS at 22:32

## 2021-11-06 RX ADMIN — Medication 137 MICROGRAM(S): at 06:31

## 2021-11-06 RX ADMIN — Medication 100 MILLIGRAM(S): at 22:32

## 2021-11-06 RX ADMIN — Medication 100 MILLIGRAM(S): at 06:31

## 2021-11-06 RX ADMIN — Medication 2000 UNIT(S): at 11:20

## 2021-11-06 RX ADMIN — POLYETHYLENE GLYCOL 3350 17 GRAM(S): 17 POWDER, FOR SOLUTION ORAL at 16:19

## 2021-11-06 RX ADMIN — Medication 650 MILLIGRAM(S): at 10:25

## 2021-11-06 RX ADMIN — Medication 15 MILLIGRAM(S): at 03:15

## 2021-11-06 RX ADMIN — SODIUM CHLORIDE 75 MILLILITER(S): 9 INJECTION INTRAMUSCULAR; INTRAVENOUS; SUBCUTANEOUS at 06:30

## 2021-11-06 RX ADMIN — SODIUM CHLORIDE 75 MILLILITER(S): 9 INJECTION INTRAMUSCULAR; INTRAVENOUS; SUBCUTANEOUS at 01:18

## 2021-11-06 NOTE — PROGRESS NOTE ADULT - ASSESSMENT
71 yr old female with hx of Atrial tachycardia, ETHAN on CPAP, COPD, hypothyroidism, HTN admitted for Epigastric Pain for r 1 day     # Acute Cholecystitis in setting of Cholelithiasis  - hemodynamically stable  - US Abdomen Limited (11.05.21): Cholelithiasis, mild gallbladder distention, and reportedly positive sonographic Bello's; sign. CBD 3mm   - Lipase 50  PLAN  - NPO, IVF, pain control  - on ceftriaxone and flagyl   - Surgery consulted; f/u further recs; spoke to surgery team- plan for possible OR monday; requesting GI eval and cardio clearance  - f/u cardio eval for cardiac clearance  - f/u GI consult  - Medically, will optimize the patient    # HTN  - c/w HCTZ    # Atrial tachycardia  - rate controlled   - c/w Cardizem PO    # COPD   - stable    # ETHAN   - c/w CPAP    # Hx of PE (provoked)  - completed 3M Eliquis     # Hypothyroidism  - c/w levothyroxine     # GERD  - c/w Protonix     # DVT ppx- start Lovenox    # Ambulate as tolerated   # Full code      Progress Note Handoff  Pending Consults: GI, cardio  Pending Tests: possible OR  Pending Results: clinical improvement   Family Discussion: Patient  Disposition: Home___X__/SNF______/Other_____/Unknown at this time_____

## 2021-11-06 NOTE — CONSULT NOTE ADULT - ASSESSMENT
71-year-old female admitted for acute cholecystitis on LFTs CBD of normal caliber on ultrasound low likelihood for choledocholithiasis recommend IV antibiotics and surgical evaluation for lap thaddeus.  If there is concern for concomitant choledocholithiasis may pursue MRCP or EUS to be followed by ERCP if needed. 71-year-old female admitted for acute cholecystitis on LFTs CBD of normal caliber on ultrasound low likelihood for choledocholithiasis recommend IV antibiotics and surgical evaluation for lap thaddeus.  If there is concern for concomitant choledocholithiasis may pursue MRCP, IOC or EUS to be followed by ERCP if needed.

## 2021-11-07 LAB
ALBUMIN SERPL ELPH-MCNC: 3.7 G/DL — SIGNIFICANT CHANGE UP (ref 3.5–5.2)
ALP SERPL-CCNC: 97 U/L — SIGNIFICANT CHANGE UP (ref 30–115)
ALT FLD-CCNC: 26 U/L — SIGNIFICANT CHANGE UP (ref 0–41)
ANION GAP SERPL CALC-SCNC: 12 MMOL/L — SIGNIFICANT CHANGE UP (ref 7–14)
AST SERPL-CCNC: 26 U/L — SIGNIFICANT CHANGE UP (ref 0–41)
BILIRUB SERPL-MCNC: 0.4 MG/DL — SIGNIFICANT CHANGE UP (ref 0.2–1.2)
BUN SERPL-MCNC: 12 MG/DL — SIGNIFICANT CHANGE UP (ref 10–20)
CALCIUM SERPL-MCNC: 8.5 MG/DL — SIGNIFICANT CHANGE UP (ref 8.5–10.1)
CHLORIDE SERPL-SCNC: 103 MMOL/L — SIGNIFICANT CHANGE UP (ref 98–110)
CO2 SERPL-SCNC: 24 MMOL/L — SIGNIFICANT CHANGE UP (ref 17–32)
COVID-19 NUCLEOCAPSID GAM AB INTERP: NEGATIVE — SIGNIFICANT CHANGE UP
COVID-19 NUCLEOCAPSID TOTAL GAM ANTIBODY RESULT: 0.08 INDEX — SIGNIFICANT CHANGE UP
COVID-19 SPIKE DOMAIN AB INTERP: POSITIVE
COVID-19 SPIKE DOMAIN ANTIBODY RESULT: >250 U/ML — HIGH
CREAT SERPL-MCNC: 0.7 MG/DL — SIGNIFICANT CHANGE UP (ref 0.7–1.5)
GLUCOSE SERPL-MCNC: 94 MG/DL — SIGNIFICANT CHANGE UP (ref 70–99)
HCT VFR BLD CALC: 35.3 % — LOW (ref 37–47)
HGB BLD-MCNC: 11.7 G/DL — LOW (ref 12–16)
MAGNESIUM SERPL-MCNC: 2.2 MG/DL — SIGNIFICANT CHANGE UP (ref 1.8–2.4)
MCHC RBC-ENTMCNC: 30.1 PG — SIGNIFICANT CHANGE UP (ref 27–31)
MCHC RBC-ENTMCNC: 33.1 G/DL — SIGNIFICANT CHANGE UP (ref 32–37)
MCV RBC AUTO: 90.7 FL — SIGNIFICANT CHANGE UP (ref 81–99)
NRBC # BLD: 0 /100 WBCS — SIGNIFICANT CHANGE UP (ref 0–0)
PLATELET # BLD AUTO: 189 K/UL — SIGNIFICANT CHANGE UP (ref 130–400)
POTASSIUM SERPL-MCNC: 3.9 MMOL/L — SIGNIFICANT CHANGE UP (ref 3.5–5)
POTASSIUM SERPL-SCNC: 3.9 MMOL/L — SIGNIFICANT CHANGE UP (ref 3.5–5)
PROT SERPL-MCNC: 6.1 G/DL — SIGNIFICANT CHANGE UP (ref 6–8)
RBC # BLD: 3.89 M/UL — LOW (ref 4.2–5.4)
RBC # FLD: 14.2 % — SIGNIFICANT CHANGE UP (ref 11.5–14.5)
SARS-COV-2 IGG+IGM SERPL QL IA: 0.08 INDEX — SIGNIFICANT CHANGE UP
SARS-COV-2 IGG+IGM SERPL QL IA: >250 U/ML — HIGH
SARS-COV-2 IGG+IGM SERPL QL IA: NEGATIVE — SIGNIFICANT CHANGE UP
SARS-COV-2 IGG+IGM SERPL QL IA: POSITIVE
SODIUM SERPL-SCNC: 139 MMOL/L — SIGNIFICANT CHANGE UP (ref 135–146)
WBC # BLD: 8.9 K/UL — SIGNIFICANT CHANGE UP (ref 4.8–10.8)
WBC # FLD AUTO: 8.9 K/UL — SIGNIFICANT CHANGE UP (ref 4.8–10.8)

## 2021-11-07 PROCEDURE — 99222 1ST HOSP IP/OBS MODERATE 55: CPT

## 2021-11-07 PROCEDURE — 99232 SBSQ HOSP IP/OBS MODERATE 35: CPT

## 2021-11-07 RX ORDER — ALPRAZOLAM 0.25 MG
0.25 TABLET ORAL ONCE
Refills: 0 | Status: DISCONTINUED | OUTPATIENT
Start: 2021-11-07 | End: 2021-11-07

## 2021-11-07 RX ORDER — HYDROCHLOROTHIAZIDE 25 MG
25 TABLET ORAL AT BEDTIME
Refills: 0 | Status: DISCONTINUED | OUTPATIENT
Start: 2021-11-07 | End: 2021-11-07

## 2021-11-07 RX ORDER — HYDROCHLOROTHIAZIDE 25 MG
25 TABLET ORAL DAILY
Refills: 0 | Status: DISCONTINUED | OUTPATIENT
Start: 2021-11-07 | End: 2021-11-08

## 2021-11-07 RX ADMIN — Medication 137 MICROGRAM(S): at 06:00

## 2021-11-07 RX ADMIN — Medication 25 MILLIGRAM(S): at 06:22

## 2021-11-07 RX ADMIN — Medication 100 MILLIGRAM(S): at 22:39

## 2021-11-07 RX ADMIN — SODIUM CHLORIDE 75 MILLILITER(S): 9 INJECTION INTRAMUSCULAR; INTRAVENOUS; SUBCUTANEOUS at 13:07

## 2021-11-07 RX ADMIN — CEFTRIAXONE 100 MILLIGRAM(S): 500 INJECTION, POWDER, FOR SOLUTION INTRAMUSCULAR; INTRAVENOUS at 06:22

## 2021-11-07 RX ADMIN — Medication 2000 UNIT(S): at 11:21

## 2021-11-07 RX ADMIN — Medication 0.25 MILLIGRAM(S): at 22:35

## 2021-11-07 RX ADMIN — Medication 650 MILLIGRAM(S): at 16:07

## 2021-11-07 RX ADMIN — Medication 180 MILLIGRAM(S): at 06:01

## 2021-11-07 RX ADMIN — Medication 100 MILLIGRAM(S): at 13:07

## 2021-11-07 RX ADMIN — Medication 100 MILLIGRAM(S): at 06:00

## 2021-11-07 RX ADMIN — PANTOPRAZOLE SODIUM 40 MILLIGRAM(S): 20 TABLET, DELAYED RELEASE ORAL at 06:00

## 2021-11-07 RX ADMIN — Medication 650 MILLIGRAM(S): at 17:00

## 2021-11-07 RX ADMIN — ENOXAPARIN SODIUM 40 MILLIGRAM(S): 100 INJECTION SUBCUTANEOUS at 22:35

## 2021-11-07 RX ADMIN — POLYETHYLENE GLYCOL 3350 17 GRAM(S): 17 POWDER, FOR SOLUTION ORAL at 11:21

## 2021-11-07 NOTE — PROGRESS NOTE ADULT - ATTENDING COMMENTS
above noted discussed case with surgical resident, pt and pt son abdomen soft no distension/tendderness

## 2021-11-07 NOTE — PROGRESS NOTE ADULT - SUBJECTIVE AND OBJECTIVE BOX
RICHARD KNOX  71y  Female      Patient is a 71y old  Female who presents with a chief complaint of epigastric pain (2021 23:20)      INTERVAL HPI/OVERNIGHT EVENTS:  Patient seen and examined earlier this morning. Patient denies any new complaints; reports her abd pain has improved. Reports she was seen by surgery team this AM. Denies F/C, vomiting, CP, palpitations.      REVIEW OF SYSTEMS:  CONSTITUTIONAL: No fever, weight loss, or fatigue  EYES: No eye pain, visual disturbances, or discharge  ENMT:  No difficulty hearing, tinnitus, vertigo; No sinus or throat pain  NECK: No pain or stiffness  RESPIRATORY: No cough, wheezing, chills or hemoptysis; No shortness of breath  CARDIOVASCULAR: No chest pain, palpitations, dizziness, or leg swelling  GASTROINTESTINAL:  No nausea, vomiting, or hematemesis; No diarrhea or constipation. +abd pain  GENITOURINARY: No dysuria, frequency, hematuria, or incontinence  NEUROLOGICAL: No headaches, memory loss, loss of strength, numbness, or tremors  SKIN: No itching, burning, rashes, or lesions   LYMPH NODES: No enlarged glands  ENDOCRINE: No heat or cold intolerance; No hair loss  MUSCULOSKELETAL: No joint pain or swelling; No muscle, back, or extremity pain      T(C): 35.7 (21 @ 05:22), Max: 36.2 (21 @ 19:12)  HR: 68 (21 @ 05:22) (63 - 74)  BP: 125/67 (21 @ 05:22) (125/67 - 160/85)  RR: 18 (21 @ 23:05) (18 - 18)  SpO2: 99% (21 @ 23:05) (99% - 99%)    PHYSICAL EXAM:  GENERAL: NAD, well-groomed, well-developed  HEAD:  Atraumatic, Normocephalic  EYES: EOMI, PERRLA, conjunctiva and sclera clear  ENMT: No tonsillar erythema, exudates, or enlargement; Moist mucous membranes  NECK: Supple, No JVD, Normal thyroid  NERVOUS SYSTEM:  Alert & Oriented X3, Good concentration; Motor Strength 5/5 B/L upper and lower extremities  CHEST/LUNG: Clear to percussion bilaterally; No rales, rhonchi, wheezing, or rubs  HEART: Regular rate and rhythm; No murmurs, rubs, or gallops  ABDOMEN: Soft, Nontender, Nondistended; Bowel sounds present  EXTREMITIES:  2+ Peripheral Pulses, No clubbing, cyanosis, or edema      Consultant(s) Notes Reviewed:  [x ] YES  [ ] NO  Care Discussed with Consultants/Other Providers [ x] YES  [ ] NO    LAB:                        12.5   12.19 )-----------( 209      ( 2021 07:09 )             37.3         137  |  100  |  14  ----------------------------<  99  4.5   |  22  |  0.6<L>    Ca    8.6      2021 07:09  Mg     2.0         TPro  6.5  /  Alb  4.0  /  TBili  <0.2  /  DBili  <0.2  /  AST  25  /  ALT  18  /  AlkPhos  98      LIVER FUNCTIONS - ( 2021 07:09 )  Alb: 4.0 g/dL / Pro: 6.5 g/dL / ALK PHOS: 98 U/L / ALT: 18 U/L / AST: 25 U/L / GGT: x           CARDIAC MARKERS ( 2021 19:32 )  x     / <0.01 ng/mL / x     / x     / x                  Drug Dosing Weight  Height (cm): 175.3 (2021 00:40)  Weight (kg): 94.7 (2021 00:40)  BMI (kg/m2): 30.8 (2021 00:40)  BSA (m2): 2.1 (2021 00:40)  Height (cm): 175.3 (21 @ 00:40)  Weight (kg): 94.7 (21 @ 00:40)  BMI (kg/m2): 30.8 (21 @ 00:40)  BSA (m2): 2.1 (21 @ 00:40)  CAPILLARY BLOOD GLUCOSE        I&O's Summary    Urinalysis Basic - ( 2021 10:29 )    Color: Yellow / Appearance: Clear / S.015 / pH: x  Gluc: x / Ketone: Negative  / Bili: Negative / Urobili: 0.2 mg/dL   Blood: x / Protein: Negative mg/dL / Nitrite: Negative   Leuk Esterase: Negative / RBC: x / WBC x   Sq Epi: x / Non Sq Epi: x / Bacteria: x        RADIOLOGY & ADDITIONAL TESTS:  Imaging Personally Reviewed:  [x] YES  [ ] NO    HEALTH ISSUES - PROBLEM Dx:          MEDS:  acetaminophen     Tablet .. 650 milliGRAM(s) Oral every 6 hours PRN  ALPRAZolam 0.25 milliGRAM(s) Oral at bedtime PRN  cefTRIAXone   IVPB 1000 milliGRAM(s) IV Intermittent every 24 hours  cholecalciferol 2000 Unit(s) Oral daily  diltiazem    milliGRAM(s) Oral daily  enoxaparin Injectable 40 milliGRAM(s) SubCutaneous at bedtime  hydrochlorothiazide 12.5 milliGRAM(s) Oral at bedtime  ketorolac   Injectable 15 milliGRAM(s) IV Push every 8 hours PRN  levothyroxine 137 MICROGram(s) Oral daily  metroNIDAZOLE  IVPB      metroNIDAZOLE  IVPB 500 milliGRAM(s) IV Intermittent every 8 hours  morphine  - Injectable 2 milliGRAM(s) IV Push every 4 hours PRN  ondansetron Injectable 4 milliGRAM(s) IV Push once  ondansetron Injectable 4 milliGRAM(s) IV Push every 6 hours PRN  oxycodone    5 mG/acetaminophen 325 mG 1 Tablet(s) Oral every 8 hours PRN  pantoprazole    Tablet 40 milliGRAM(s) Oral before breakfast  sodium chloride 0.9%. 1000 milliLiter(s) IV Continuous <Continuous>    
Progress Note: General Surgery  Patient: RICHARD KNOX , 71y (1950)Female   MRN: [id number]  Location: [address] [place] [address]  Visit: 21 Inpatient  Date: 21 @ 08:31    Admit Diagnosis/Chief Complaint:   cholelithiasis r/o cholecystitis    Vitals: T(F): 96.8 (21 @ 05:05), Max: 98.2 (21 @ 13:54)  HR: 67 (21 @ 05:05)  BP: 115/61 (21 @ 05:05) (115/61 - 134/64)  RR: 18 (21 @ 21:04)  SpO2: --    In:   Out:   Net:     Diet: Diet, Clear Liquid (21 @ 15:07)    IV Fluids: cholecalciferol 2000 Unit(s) Oral daily  sodium chloride 0.9%. 1000 milliLiter(s) (75 mL/Hr) IV Continuous <Continuous>      Physical Examination:  General Appearance: NAD   HEENT: EOMI, sclera non-icteric.  Heart: RRR   Lungs: CTABL.   Abdomen:  Soft, nontender, nondistended.   MSK/Extremities: Warm & well-perfused.   Skin: Warm, dry. No jaundice.       Medications: [Standing]  cefTRIAXone   IVPB 1000 milliGRAM(s) IV Intermittent every 24 hours  cholecalciferol 2000 Unit(s) Oral daily  diltiazem    milliGRAM(s) Oral daily  enoxaparin Injectable 40 milliGRAM(s) SubCutaneous at bedtime  hydrochlorothiazide 25 milliGRAM(s) Oral daily  levothyroxine 137 MICROGram(s) Oral daily  metroNIDAZOLE  IVPB      metroNIDAZOLE  IVPB 500 milliGRAM(s) IV Intermittent every 8 hours  ondansetron Injectable 4 milliGRAM(s) IV Push once  pantoprazole    Tablet 40 milliGRAM(s) Oral before breakfast  sodium chloride 0.9%. 1000 milliLiter(s) (75 mL/Hr) IV Continuous <Continuous>    DVT Prophylaxis: enoxaparin Injectable 40 milliGRAM(s) SubCutaneous at bedtime    GI Prophylaxis: pantoprazole    Tablet 40 milliGRAM(s) Oral before breakfast    Antibiotics: cefTRIAXone   IVPB 1000 milliGRAM(s) IV Intermittent every 24 hours  metroNIDAZOLE  IVPB      metroNIDAZOLE  IVPB 500 milliGRAM(s) IV Intermittent every 8 hours    Anticoagulation:   Medications:[PRN]  acetaminophen     Tablet .. 650 milliGRAM(s) Oral every 6 hours PRN  ALPRAZolam 0.25 milliGRAM(s) Oral at bedtime PRN  Shrdml-Yjrecheb-Kiui -40 mg 1 Tablet(s) 1 Tablet(s) Oral every 6 hours PRN  ketorolac   Injectable 15 milliGRAM(s) IV Push every 8 hours PRN  morphine  - Injectable 2 milliGRAM(s) IV Push every 4 hours PRN  ondansetron Injectable 4 milliGRAM(s) IV Push every 6 hours PRN  oxycodone    5 mG/acetaminophen 325 mG 1 Tablet(s) Oral every 8 hours PRN  polyethylene glycol 3350 17 Gram(s) Oral two times a day PRN      Labs:                        11.7   8.90  )-----------( 189      ( 2021 06:48 )             35.3     11-    137  |  100  |  14  ----------------------------<  99  4.5   |  22  |  0.6<L>    Ca    8.6      2021 07:09  Mg     2.0     11-    TPro  6.5  /  Alb  4.0  /  TBili  <0.2  /  DBili  <0.2  /  AST  25  /  ALT  18  /  AlkPhos  98  11-06    LIVER FUNCTIONS - ( 2021 07:09 )  Alb: 4.0 g/dL / Pro: 6.5 g/dL / ALK PHOS: 98 U/L / ALT: 18 U/L / AST: 25 U/L / GGT: x               CARDIAC MARKERS ( 2021 19:32 )  x     / <0.01 ng/mL / x     / x     / x            Urine/Micro:    Urinalysis Basic - ( 2021 10:29 )    Color: Yellow / Appearance: Clear / S.015 / pH: x  Gluc: x / Ketone: Negative  / Bili: Negative / Urobili: 0.2 mg/dL   Blood: x / Protein: Negative mg/dL / Nitrite: Negative   Leuk Esterase: Negative / RBC: x / WBC x   Sq Epi: x / Non Sq Epi: x / Bacteria: x        Imaging:   < from: US Abdomen Limited (21 @ 20:13) >  IMPRESSION:    Cholelithiasis, mild gallbladder distention, and reportedly positive sonographic Bello's sign. Findings are suspicious for acute cholecystitis.    --- End of Report ---    < end of copied text >        
71 yr old female with hx of Atrial tachycardia, ETHAN on CPAP, COPD, hypothyroidism, HTN admitted for Epigastric Pain for r 1 day.    Today:  Seen at bedside, no new complaints.  Would like to defer surgery at this time and try to advance diet then consider cholecystectomy at a later date with her primary GI in [place].          REVIEW OF SYSTEMS:  No new complaints      MEDICATIONS  (STANDING):  cefTRIAXone   IVPB 1000 milliGRAM(s) IV Intermittent every 24 hours  cholecalciferol 2000 Unit(s) Oral daily  diltiazem    milliGRAM(s) Oral daily  enoxaparin Injectable 40 milliGRAM(s) SubCutaneous at bedtime  hydrochlorothiazide 25 milliGRAM(s) Oral daily  levothyroxine 137 MICROGram(s) Oral daily  metroNIDAZOLE  IVPB      metroNIDAZOLE  IVPB 500 milliGRAM(s) IV Intermittent every 8 hours  ondansetron Injectable 4 milliGRAM(s) IV Push once  pantoprazole    Tablet 40 milliGRAM(s) Oral before breakfast  sodium chloride 0.9%. 1000 milliLiter(s) (75 mL/Hr) IV Continuous <Continuous>    MEDICATIONS  (PRN):  acetaminophen     Tablet .. 650 milliGRAM(s) Oral every 6 hours PRN Mild Pain (1 - 3)  ALPRAZolam 0.25 milliGRAM(s) Oral at bedtime PRN anxiety  Vsdccd-Tkcccohm-Nqyx -40 mg 1 Tablet(s) 1 Tablet(s) Oral every 6 hours PRN FOR HEADACHE  ketorolac   Injectable 15 milliGRAM(s) IV Push every 8 hours PRN Severe Pain (7 - 10)  morphine  - Injectable 2 milliGRAM(s) IV Push every 4 hours PRN Moderate Pain (4 - 6)  ondansetron Injectable 4 milliGRAM(s) IV Push every 6 hours PRN Nausea and/or Vomiting  oxycodone    5 mG/acetaminophen 325 mG 1 Tablet(s) Oral every 8 hours PRN Moderate Pain (4 - 6)  polyethylene glycol 3350 17 Gram(s) Oral two times a day PRN Constipation      Allergies  codeine (Vomiting; Nausea)  cortisone (Other; Rash; Pruritus)  fentanyl (Vomiting)  latex (Pruritus; Rash)  lidocaine topical (Rash)  morphine (Vomiting)  penicillin ([place] Synd; Rash; Pruritus)  predniSONE (Other; Rash)              FAMILY HISTORY:  Family history of breast cancer in mother (Mother)  Family history of CKD (chronic kidney disease) (Father)        Vital Signs Last 24 Hrs  T(C): 36 (2021 05:05), Max: 36.8 (2021 13:54)  T(F): 96.8 (2021 05:05), Max: 98.2 (2021 13:54)  HR: 67 (2021 05:05) (60 - 67)  BP: 115/61 (2021 05:05) (115/61 - 134/64)  RR: 18 (2021 21:04) (18 - 18)      PHYSICAL EXAM:  GENERAL: NAD, well-groomed, well-developed  HEAD:  Atraumatic, Normocephalic  EYES: EOMI, PERRLA, conjunctiva and sclera clear  ENMT: No tonsillar erythema, exudates, or enlargement; Moist mucous membranes  NECK: Supple, No JVD, Normal thyroid  NERVOUS SYSTEM:  Alert & Oriented X3, Good concentration; Motor Strength 5/5 B/L upper and lower extremities  CHEST/LUNG: Clear to percussion bilaterally; No rales, rhonchi, wheezing, or rubs  HEART: Regular rate and rhythm; No murmurs, rubs, or gallops  ABDOMEN: Soft, Nontender, Nondistended; Bowel sounds present  EXTREMITIES:  2+ Peripheral Pulses, No clubbing, cyanosis, or edema      LABS:                        11.7   8.90  )-----------( 189      ( 2021 06:48 )             35.3     11-07    139  |  103  |  12  ----------------------------<  94  3.9   |  24  |  0.7    Ca    8.5      2021 06:48  Mg     2.2     -    TPro  6.1  /  Alb  3.7  /  TBili  0.4  /  DBili  x   /  AST  26  /  ALT  26  /  AlkPhos  97  11-07      Urinalysis Basic - ( 2021 10:29 )    Color: Yellow / Appearance: Clear / S.015 / pH: x  Gluc: x / Ketone: Negative  / Bili: Negative / Urobili: 0.2 mg/dL   Blood: x / Protein: Negative mg/dL / Nitrite: Negative   Leuk Esterase: Negative / RBC: x / WBC x   Sq Epi: x / Non Sq Epi: x / Bacteria: x

## 2021-11-07 NOTE — CONSULT NOTE ADULT - ASSESSMENT
Patient with hx htn atrial tach ETHAN on c-pap. She had multiple surgeries in past . No cardiac issues with them She had PE after shoulder surgery. She denies angina or chf. She gets sob after  climbing over 2 flights steps.. If she needs GB surgery. Cardiac risk appears intermediate . Note ethan and pe history post op

## 2021-11-07 NOTE — PROGRESS NOTE ADULT - ASSESSMENT
Assessment:  71y Female patient admitted with abdominal pain, cholelithiasis, r/o cholecystitis. Patient reports having pain across the entire upper abdomen, that she describes as a gas pressure. This morning, pain much improved. WBC decreased. GI evaluated and they are not concerned of choledocholithiasis.  Patient seen and examined at bedside. NAD.     This patient likely with biliary colic. I explained to the patient the importance of gallbladder removal, and did offer her inpatient surgery, but she is requesting to handle this as an outpatient. She knows many surgeons in [place] that she also wants to see. I explained to her that Dr. Roland and our team will be more than happy to follow with her if she chooses.     Plan:  No surgical intervention necessary on this admission  Ensure that this morning's LFTs are still WNL, and did not increase before discharging patient.  Please have follow up with Dr. Roland for outpatient cholecystectomy    Date/Time: 11-07-21 @ 08:31 Assessment:  71y Female patient admitted with abdominal pain, cholelithiasis, r/o cholecystitis. Patient reports having pain across the entire upper abdomen, that she describes as a gas pressure. This morning, pain much improved. WBC decreased. GI evaluated and they are not concerned of choledocholithiasis.  Patient seen and examined at bedside. NAD.     This patient likely with biliary colic. I explained to the patient the importance of gallbladder removal, and did offer her inpatient surgery, but she is requesting to handle this as an outpatient. She knows many surgeons in [place] that she also wants to see. I explained to her that Dr. Roland and our team will be more than happy to follow with her if she chooses.     Plan:  No surgical intervention necessary on this admission  Ensure that this morning's LFTs are still WNL, and did not increase before discharging patient.  Please have follow up with Dr. Roland for outpatient cholecystectomy  Advance diet as tolerated to lowfat diet before discharging    Date/Time: 11-07-21 @ 08:31

## 2021-11-07 NOTE — CONSULT NOTE ADULT - SUBJECTIVE AND OBJECTIVE BOX
· Chief Complaint: The patient is a 71y Female complaining of abdominal pain.  · HPI Objective Statement: 70 yo female hx of HTN/hiatal hernia, gerd, asthma, wilma on cpap, hypothyroid, mitral valve prolapse, migraine  ha,  present c/o upper abdominal pain radiating to her upper back since 1 pm after eating beans soap. pain associates with nausea and dry heaving.  . Denies fever/chill/diarrhea/change of appetite/constipation and urinary sxs. Denies HA/dizziness/chest pain/sob/palpitations and pain to extremities and ambulatory difficulty.    HIV:    HIV Test Questions:  · In accordance with [place] State law, we offer every patient who comes to our ED an HIV test. Would you like to be tested today?	Previously Declined (within the last year)    PAST MEDICAL/SURGICAL/FAMILY/SOCIAL HISTORY:    Past Medical, Past Surgical, and Family History:  PAST MEDICAL HISTORY:  Cataract     Gastroesophageal reflux disease     H/O: rheumatic fever     Herniated disc, cervical     History of palpitations     Hypertension     Hypothyroid     Migraines     Mitral valve prolapse     WILMA on CPAP.     PAST SURGICAL HISTORY:  Cataract extraction status of eye, left     H/O knee surgery torn minsicus repair b/l knee    H/O: .      Tobacco Usage:  · Tobacco Usage	Never smoker    ALLERGIES AND HOME MEDICATIONS:   Allergies:        Allergies:  	latex: Latex, Pruritus, Rash  	penicillin: Drug, Red Man Synd, Rash, Pruritus, has tolerated Cefazolin in the past  	cortisone: Drug, Other, Rash, Pruritus, htn  	predniSONE: Drug, Other, Rash, htn  	lidocaine topical: Drug, Rash  	fentanyl: Drug, Vomiting  	morphine: Drug, Vomiting  	codeine: Drug, Vomiting, Nausea    Home Medications:   * Incomplete Medication History as of 2021 19:40 documented in Structured Notes  · 	polyethylene glycol 3350 oral powder for reconstitution: Last Dose Taken:  , 17 gram(s) orally once a day, As Needed  · 	Synthroid 137 mcg (0.137 mg) oral tablet: Last Dose Taken:  , 1 tab(s) orally once a day  · 	Vitamin D3: Last Dose Taken:  , 2 tab(s) orally once a day  · 	butalbital: Last Dose Taken:  , 2 times a day, As Needed  · 	hydroCHLOROthiazide 12.5 mg oral tablet: Last Dose Taken:  , 1 tab(s) orally once a day  · 	NexIUM: Last Dose Taken:    · 	Xanax 0.25 mg oral tablet: Last Dose Taken:  , 1 tab(s) orally once a day (at bedtime)    REVIEW OF SYSTEMS:    Review of Systems:  · CONSTITUTIONAL: no fever and no chills.  · EYES: no discharge, no irritation, no pain, no redness, and no visual changes.  · ENMT: Ears: no ear pain and no hearing problems. Nose: no nasal congestion and no nasal drainage. Mouth/Throat: no dysphagia, no hoarseness and no throat pain. Neck: no lumps, no pain, no stiffness and no swollen glands.  · CARDIOVASCULAR: no chest pain and no edema.  · RESPIRATORY: no chest pain, no cough, and no shortness of breath.  · GASTROINTESTINAL: - - -  · Gastrointestinal [+]: ABDOMINAL PAIN  · GENITOURINARY: no dysuria, no frequency, and no hematuria.  · MUSCULOSKELETAL: no back pain, no gout, no musculoskeletal pain, no neck pain, and no weakness.  · SKIN: no abrasions, no jaundice, no lesions, no pruritis, and no rashes.  · NEURO: no loss of consciousness, no gait abnormality, no headache, no sensory deficits, and no weakness.    VITAL SIGNS ([place]):    ,,ED ADULT Flow Sheet:    2021 19:12  · Temp (F): 97.2  · Temp (C) Temp (C): 36.2  · Temp site Temp Site: oral  · Heart Rate Heart Rate (beats/min): 74  · BP Systolic Systolic: 160  · BP Diastolic Diastolic (mm Hg): 85  · Respiration Rate (breaths/min) Respiration Rate (breaths/min): 18  · SpO2 (%) SpO2 (%): 99  · O2 Delivery/Oxygen Delivery Method Patient On (Patient Delivery Method): room air  · How was the weight captured? Weight Type/Method: stated  · Dosing Weight (KILOGRAMS) Dosing Weight (KILOGRAMS): 93.4  · Dosing Weight  (POUNDS) Dosing Weight (POUNDS): 205.9  · Height type Height Type: stated  · Height (FEET) Height (FEET): 5  · Height (INCHES) Height (INCHES): 8  · Height (CENTIMETERS) Height (CENTIMETERS): 172.72  · BSA (m2): 2.07  · BMI (kG/m2) BMI (kG/m2): 31.3  · Ideal Body Weight(kg) Ideal Body Weight(kg): 64  · SpO2 (%) SpO2 (%): 99  · Preferred Language to Address Healthcare Preferred Language to Address Healthcare: English    20:40  · Presence of Pain: denies pain/discomfort  · Pain Rating (0-10): Rest: 0  · Pain Rating (0-10): Activity: 0  · Preferred Language to Address Healthcare Preferred Language to Address Healthcare: English  · Extensions of Self Extensions of Self: None    20:46  · Heart Rate Heart Rate (beats/min): 65  · BP Systolic Systolic: 146  · BP Diastolic Diastolic (mm Hg): 70    PHYSICAL EXAM:   · Physical Examination: CONSTITUTIONAL: Well-appearing; well-nourished; in no apparent distress.   	EYES: PERRL; EOM intact.   	CARDIOVASCULAR: Normal S1, S2; no murmurs, rubs, or gallops.   	RESPIRATORY: Normal chest excursion with respiration; breath sounds clear and equal bilaterally; no wheezes, rhonchi, or rales.  	GI/: + epigastric and RUQ tenderness. Normal bowel sounds; non-distended; no palpable organomegaly.   	MS: No calf swelling and tenderness.  	SKIN: Normal for age and race; warm; dry; good turgor; no apparent lesions or exudate.   NEURO/PSYCH: A & O x 4; grossly unremarkable.                        12.9   10.58 )-----------( 237      ( 2021 19:32 )             38.9       135  |  95<L>  |  21<H>  ----------------------------<  112<H>  3.8   |  29  |  0.8    Ca    9.3      2021 19:32    TPro  7.2  /  Alb  4.4  /  TBili  <0.2  /  DBili  x   /  AST  22  /  ALT  21  /  AlkPhos  106    EXAM:  US ABDOMEN LIMITED            PROCEDURE DATE:  2021            INTERPRETATION:  CLINICAL HISTORY: Epigastric pain.    COMPARISON: Correlation with CT of the abdomen and pelvis on 2019..    PROCEDURE: Ultrasound of the right upperquadrant was performed.    FINDINGS:    LIVER:  Normal in contour and echogenicity measuring 19.9 cm in length. No focal mass.    GALLBLADDER/BILIARY TREE: The gallbladder is mildly distended. Cholelithiasis with borderline gallbladder wall thickening; gallbladder wall measures up to 3 mm. No pericholecystic fluid. Reportedly positive sonographic Bello's sign. No intrahepatic biliary ductal dilatation. The common bile duct measures 3 mm, which is normal.    PANCREAS:  Visualized head and body are unremarkable. Remainder obscured by overlying bowel gas.    KIDNEY:  Right kidney measures 10.3 cm in length. No hydronephrosis, calculi or solid mass.    AORTA/IVC:  Visualized proximal portions unremarkable.    ASCITES:  None.    IMPRESSION:    Cholelithiasis, mild gallbladder distention, and reportedly positive sonographic Bello's sign. Findings are suspicious for acute cholecystitis.    --- End of Report ---            VELMA FERNANDO MD; Resident Radiologist  This document has been electronically signed.  LAI HORTON MD; Attending Radiologist  This document has been electronically signed. 2021  9:14PM  
CARDIOLOGY CONSULT NOTE     CHIEF COMPLAINT/REASON FOR CONSULT:    HPI:  71 yr female hx of HTN, hiatal hernia, GERD, asthma, ETHAN on cpap, hypothyroid, mitral valve prolapse, Migraine  present c/o upper abdominal pain radiating to her upper back since 1 pm after eating bean soup. Last PO intake was around 430 pm she ate a banana. Pt states that the  pain is 9/10 continuous associates with nausea and dry heaving.  Denies fever/chill/diarrhea/change of appetite/constipation and urinary sxs.     Pt also adds that she had a PE in  after a upper extremity surgery but is off Eliquis since 2021.  Pt came with her BIPAP machine from home at bed side, is vomiting on and off. (2021 23:20)      PAST MEDICAL & SURGICAL HISTORY:  Hypertension    Hypothyroid    Gastroesophageal reflux disease    Cataract    Migraines    ETHAN on CPAP    History of palpitations    Herniated disc, cervical    H/O: rheumatic fever    Mitral valve prolapse    H/O:     H/O knee surgery  torn minsicus repair b/l knee    Cataract extraction status of eye, left        Cardiac Risks:   [x ]HTN, [ ] DM, [ ] Smoking, [x ] FH,  [ ] Lipids        MEDICATIONS:  MEDICATIONS  (STANDING):  cefTRIAXone   IVPB 1000 milliGRAM(s) IV Intermittent every 24 hours  cholecalciferol 2000 Unit(s) Oral daily  diltiazem    milliGRAM(s) Oral daily  enoxaparin Injectable 40 milliGRAM(s) SubCutaneous at bedtime  hydrochlorothiazide 25 milliGRAM(s) Oral daily  levothyroxine 137 MICROGram(s) Oral daily  metroNIDAZOLE  IVPB      metroNIDAZOLE  IVPB 500 milliGRAM(s) IV Intermittent every 8 hours  ondansetron Injectable 4 milliGRAM(s) IV Push once  pantoprazole    Tablet 40 milliGRAM(s) Oral before breakfast  sodium chloride 0.9%. 1000 milliLiter(s) (75 mL/Hr) IV Continuous <Continuous>      FAMILY HISTORY:  Family history of breast cancer in mother (Mother)    Family history of CKD (chronic kidney disease) (Father)        SOCIAL HISTORY:      [ ] Marital status    Allergies    codeine (Vomiting; Nausea)  cortisone (Other; Rash; Pruritus)  fentanyl (Vomiting)  latex (Pruritus; Rash)  lidocaine topical (Rash)  morphine (Vomiting)  penicillin (Red Man Synd; Rash; Pruritus)  predniSONE (Other; Rash)        	    REVIEW OF SYSTEMS:  CONSTITUTIONAL: No fever, weight loss, or fatigue  EYES: No eye pain, visual disturbances, or discharge  ENMT:  No difficulty hearing, tinnitus, vertigo; No sinus or throat pain  NECK: No pain or stiffness  RESPIRATORY: No cough, wheezing, chills or hemoptysis; No Shortness of Breath  CARDIOVASCULAR: No chest pain, palpitations, passing out, dizziness, or leg swelling  GASTROINTESTINAL: See  above  GENITOURINARY: No dysuria, frequency, hematuria, or incontinence  NEUROLOGICAL: No headaches, memory loss, loss of strength, numbness, or tremors  SKIN: No itching, burning, rashes, or lesions   	      PHYSICAL EXAM:  T(C): 36 (21 @ 05:05), Max: 36.8 (21 @ 13:54)  HR: 67 (21 @ 05:05) (60 - 67)  BP: 115/61 (21 @ 05:05) (115/61 - 134/64)  RR: 18 (21 @ 21:04) (18 - 18)  SpO2: --  Wt(kg): --  I&O's Summary      Appearance: Normal	  Psychiatry: A & O x 3, Mood & affect appropriate  HEENT:   Normal oral mucosa, PERRL, EOMI	  Lymphatic: No lymphadenopathy  Cardiovascular: Normal S1 S2,RRR, No JVD, No murmurs  Respiratory: Lungs clear to auscultation	  Gastrointestinal:  Soft, Non-tender, + BS	  Skin: No rashes, No ecchymoses, No cyanosis	  Neurologic: Non-focal  Extremities: Normal range of motion, No clubbing, cyanosis or edema  Vascular: Peripheral pulses palpable 2+ bilaterally      ECG:  	< from: 12 Lead ECG (21 @ 21:00) >  Diagnosis Line Normal sinus rhythm with sinus arrhythmia  Rightward axis  Borderline ECG    Confirmed by RAYMOND BARTON MD ([id number]) on 2021 8:57:13 AM    < end of copied text >      	  LABS:	 	    CARDIAC MARKERS:                                    11.7   8.90  )-----------( 189      ( 2021 06:48 )             35.3         139  |  103  |  12  ----------------------------<  94  3.9   |  24  |  0.7    Ca    8.5      2021 06:48  Mg     2.2         TPro  6.1  /  Alb  3.7  /  TBili  0.4  /  DBili  x   /  AST  26  /  ALT  26  /  AlkPhos  97                
Gastroenterology/Hepatology Consultation    For questions and inquiries please page [phone].  For urgent matters or after 5pm and on weekends please page the fellow on call through the GI paging system.    71y Female with   Patient is a 71y old  Female who presents with a chief complaint of epigastric pain (2021 11:59)    HPI:  71 yr female hx of HTN, hiatal hernia, GERD, asthma, ETHAN on cpap, hypothyroid, mitral valve prolapse, Migraine  present c/o upper abdominal pain radiating to her upper back since 1 pm after eating bean soup. Last PO intake was around 430 pm she ate a banana. Pt states that the  pain is 9/10 continuous associates with nausea and dry heaving.  Denies fever/chill/diarrhea/change of appetite/constipation and urinary sxs.     Pt also adds that she had a PE in  after a upper extremity surgery but is off Eliquis since 2021.  Pt came with her BIPAP machine from home at bed side, is vomiting on and off. (2021 23:20)      GI Hx:71-year-old female history of ETHAN on CPAP asthma hypertension mitral valve prolapse presented for abdominal pain radiating to the upper back for 1 day duration lasted for hours after she ate.  Patient is also associated nausea and dry heaving no vomiting.  Denies fever chills.    Previous colonoscopy(ies):none  Previous EGD(s):none    No pertinent family history in first degree relatives    No pertinent family history in first degree relatives    No pertinent family history in first degree relatives    Family history of breast cancer in mother (Mother)    Family history of CKD (chronic kidney disease) (Father)        Review of system  General:  (-) weight loss, (-) fevers  Eyes:  (-) visual changes  CV:  (-) chest pain  Resp: (-) SOB, (-) wheezing  GI: (-) abdominal pain,  (-) nausea, (-) vomiting, (-) dysphagia, (-) diarrhea, (-) constipation, (-) rectal bleeding, (-) melena, (-) hematemesis.  Neuro: (-) confusion, (-) weakness  Psych:  (-) Hallucinations  Heme:  (-) easy bruisability    Past medical/surgical Hx:  PAST MEDICAL & SURGICAL HISTORY:  Hypertension    Hypothyroid    Gastroesophageal reflux disease    Cataract    Migraines    ETHAN on CPAP    History of palpitations    Herniated disc, cervical    H/O: rheumatic fever    Mitral valve prolapse    H/O:     H/O knee surgery  torn minsicus repair b/l knee    Cataract extraction status of eye, left      Home Medications:  butalbital: 2 times a day, As Needed  DilTIAZem (Eqv-Cardizem CD) 180 mg/24 hours oral capsule, extended release: 1 cap(s) orally once a day  hydroCHLOROthiazide 12.5 mg oral tablet: 1 tab(s) orally once a day  NexIUM:   polyethylene glycol 3350 oral powder for reconstitution: 17 gram(s) orally once a day, As Needed  Synthroid 137 mcg (0.137 mg) oral tablet: 1 tab(s) orally once a day  Vitamin D3: 2 tab(s) orally once a day  Xanax 0.25 mg oral tablet: 1 tab(s) orally once a day (at bedtime)      Allergies: codeine (Vomiting; Nausea)  cortisone (Other; Rash; Pruritus)  fentanyl (Vomiting)  latex (Pruritus; Rash)  lidocaine topical (Rash)  morphine (Vomiting)  penicillin (Red Man Synd; Rash; Pruritus)  predniSONE (Other; Rash)      Current Medications:   acetaminophen     Tablet .. 650 milliGRAM(s) Oral every 6 hours PRN  ALPRAZolam 0.25 milliGRAM(s) Oral at bedtime PRN  cefTRIAXone   IVPB 1000 milliGRAM(s) IV Intermittent every 24 hours  cholecalciferol 2000 Unit(s) Oral daily  diltiazem    milliGRAM(s) Oral daily  enoxaparin Injectable 40 milliGRAM(s) SubCutaneous at bedtime  hydrochlorothiazide 12.5 milliGRAM(s) Oral at bedtime  ketorolac   Injectable 15 milliGRAM(s) IV Push every 8 hours PRN  levothyroxine 137 MICROGram(s) Oral daily  metroNIDAZOLE  IVPB      metroNIDAZOLE  IVPB 500 milliGRAM(s) IV Intermittent every 8 hours  morphine  - Injectable 2 milliGRAM(s) IV Push every 4 hours PRN  ondansetron Injectable 4 milliGRAM(s) IV Push once  ondansetron Injectable 4 milliGRAM(s) IV Push every 6 hours PRN  oxycodone    5 mG/acetaminophen 325 mG 1 Tablet(s) Oral every 8 hours PRN  pantoprazole    Tablet 40 milliGRAM(s) Oral before breakfast  sodium chloride 0.9%. 1000 milliLiter(s) IV Continuous <Continuous>      Physical exam:  T(C): 35.7 (21 @ 05:22), Max: 36.2 (21 @ 19:12)  HR: 68 (21 @ 05:22) (63 - 74)  BP: 125/67 (21 @ 05:22) (125/67 - 160/85)  RR: 18 (21 @ 23:05) (18 - 18)  SpO2: 99% (21 @ 23:05) (99% - 99%)  GENERAL: NAD  HEAD:  Atraumatic, Normocephalic  EYES: Sclera:NL  NECK: Supple, no JVD or thyromegaly  CHEST/LUNG: Good bilateral air entry  HEART: normal S1, S2. Regular  ABDOMEN: (-) distended, (-) tender, (-) rebound, (+) BS, (-)HSM  EXTREMITIES: (-) edema  NEUROLOGY: (-) asterixis  SKIN: (-) jaundice  MELANI: (-) melena (-) brbpr    Data:                        12.5   12. )-----------( 209      ( 2021 07:09 )             37.3     MCV 89.9 (21)  90.0 (21)    RDW 13.9 (21)  13.8 (21)    HGB trend:  12.5  21 @ 07:09  12.9  21 @ 19:32          137  |  100  |  14  ----------------------------<  99  4.5   |  22  |  0.6<L>    Ca    8.6      2021 07:09  Mg     2.0         TPro  6.5  /  Alb  4.0  /  TBili  <0.2  /  DBili  <0.2  /  AST  25  /  ALT  18  /  AlkPhos  98      Liver panel trend:  TBili <0.2   /   AST 25   /   ALT 18   /   AlkP 98   /   Tptn 6.5   /   Alb 4.0    /   DBili <0.2        TBili <0.2   /   AST 22   /   ALT 21   /   AlkP 106   /   Tptn 7.2   /   Alb 4.4    /   DBili --          Lipase, Serum: 22 U/L (21 @ 08:42)  Lipase, Serum: 21 U/L (21 @ 07:09)    < from: US Abdomen Limited (21 @ 20:13) >  INTERPRETATION:  CLINICAL HISTORY: Epigastric pain.    COMPARISON: Correlation with CT of the abdomen and pelvis on 2019..    PROCEDURE: Ultrasound of the right upperquadrant was performed.    FINDINGS:    LIVER:  Normal in contour and echogenicity measuring 19.9 cm in length. No focal mass.    GALLBLADDER/BILIARY TREE: The gallbladder is mildly distended. Cholelithiasis with borderline gallbladder wall thickening; gallbladder wall measures up to 3 mm. No pericholecystic fluid. Reportedly positive sonographic Bello's sign. No intrahepatic biliary ductal dilatation. The common bile duct measures 3 mm, which is normal.    PANCREAS:  Visualized head and body are unremarkable. Remainder obscured by overlying bowel gas.    KIDNEY:  Right kidney measures 10.3 cm in length. No hydronephrosis, calculi or solid mass.    AORTA/IVC:  Visualized proximal portions unremarkable.    ASCITES:  None.    IMPRESSION:    Cholelithiasis, mild gallbladder distention, and reportedly positive sonographic Bello's sign. Findings are suspicious for acute cholecystitis.    < end of copied text >

## 2021-11-07 NOTE — PROGRESS NOTE ADULT - ASSESSMENT
71 yr old female with hx of Atrial tachycardia, ETHAN on CPAP, COPD, hypothyroidism, HTN admitted for Epigastric Pain for r 1 day     # Acute Cholecystitis in setting of Cholelithiasis  - hemodynamically stable  - US Abdomen Limited (11.05.21): Cholelithiasis, mild gallbladder distention, and reportedly positive sonographic Bello's; sign. CBD 3mm   - Lipase 50  PLAN  - Advance diet, IVF, pain control  - on ceftriaxone and flagyl   - Surgery consulted; f/u further recs; spoke to surgery team- plan for possible OR monday if clinically worsens after advancing diet  -If patient tolerates advancing diet will DC likely tomorrow    # HTN  - c/w HCTZ    # Atrial tachycardia  - rate controlled   - c/w Cardizem PO    # COPD   - stable    # ETHAN   - c/w CPAP    # Hx of PE (provoked)  - completed 3M Eliquis     # Hypothyroidism  - c/w levothyroxine     # GERD  - c/w Protonix     # DVT ppx- start Lovenox    # Ambulate as tolerated   # Full code      Progress Note Handoff  Pending Consults: GI, cardio  Pending Tests: possible OR  Pending Results: clinical improvement   Family Discussion: Patient  Disposition: Home___X__/SNF______/Other_____/Unknown at this time_____

## 2021-11-08 ENCOUNTER — TRANSCRIPTION ENCOUNTER (OUTPATIENT)
Age: 71
End: 2021-11-08

## 2021-11-08 VITALS — HEART RATE: 78 BPM | TEMPERATURE: 98 F | SYSTOLIC BLOOD PRESSURE: 100 MMHG | DIASTOLIC BLOOD PRESSURE: 57 MMHG

## 2021-11-08 PROCEDURE — 99238 HOSP IP/OBS DSCHRG MGMT 30/<: CPT

## 2021-11-08 RX ORDER — CIPROFLOXACIN LACTATE 400MG/40ML
1 VIAL (ML) INTRAVENOUS
Qty: 7 | Refills: 0
Start: 2021-11-08 | End: 2021-11-14

## 2021-11-08 RX ORDER — METRONIDAZOLE 500 MG
1 TABLET ORAL
Qty: 7 | Refills: 0
Start: 2021-11-08 | End: 2021-11-14

## 2021-11-08 RX ADMIN — CEFTRIAXONE 100 MILLIGRAM(S): 500 INJECTION, POWDER, FOR SOLUTION INTRAMUSCULAR; INTRAVENOUS at 06:52

## 2021-11-08 RX ADMIN — PANTOPRAZOLE SODIUM 40 MILLIGRAM(S): 20 TABLET, DELAYED RELEASE ORAL at 06:52

## 2021-11-08 RX ADMIN — Medication 180 MILLIGRAM(S): at 06:51

## 2021-11-08 RX ADMIN — Medication 100 MILLIGRAM(S): at 06:52

## 2021-11-08 RX ADMIN — Medication 137 MICROGRAM(S): at 06:52

## 2021-11-08 RX ADMIN — Medication 25 MILLIGRAM(S): at 06:51

## 2021-11-08 NOTE — DISCHARGE NOTE NURSING/CASE MANAGEMENT/SOCIAL WORK - PATIENT PORTAL LINK FT
You can access the FollowMyHealth Patient Portal offered by [facility] by registering at the following website: [email or web address]. By joining [facility]’s FollowMyHealth portal, you will also be able to view your health information using other applications (apps) compatible with our system.

## 2021-11-08 NOTE — DISCHARGE NOTE PROVIDER - HOSPITAL COURSE
71 yr old female with hx of Atrial tachycardia, ETHAN on CPAP, COPD, hypothyroidism, HTN admitted for Epigastric Pain for r 1 day     # Acute Cholecystitis in setting of Cholelithiasis  - hemodynamically stable  - US Abdomen Limited (11.05.21): Cholelithiasis, mild gallbladder distention, and reportedly positive sonographic Bello's; sign. CBD 3mm   - Lipase 50  PLAN  - Advanced diet, IVF, pain control  - on ceftriaxone and flagyl->DC on oral   - Surgery consulted, spoke to surgery team- surgery signed off as diet advanced.  Patient has some mild discomfort with advanced diet.  She would prefer to follow with her team at [facility] and does want her surgery performed here.  Given current clinical status and patient's close follow up capability as outpatient she can be DCed home with follow up with her PMDs.

## 2021-11-08 NOTE — DISCHARGE NOTE PROVIDER - NSDCMRMEDTOKEN_GEN_ALL_CORE_FT
Cardiac Catheterization/Percutaneous Coronary intervention (PCI) Required data elements    Catheterization Lab visit: Clinical Evaluation Leading to Procedure     If shortness of breath is anginal equivalent, then code appropriate CAD presentation and anginal classification.    Non- STEMI      If STEMI or Non-STEMI, Symptom Onset Date/ Time : 3/23/18 13:30       [x] Time Estimated   [] Time NA   []  Thrombolytic administered for STEMI         Start Date/Time ___________    Anginal Classification within 2 weeks    CCS Class: (Only Choose one)    []  No Symptoms  []  I  []  II  [x]  III  []  IV      Anti- Anginal Medications within 2 weeks (select all that apply) do not include IV medications.    []  Beta Blockers  []  Calcium Channel Blockers  []  Long Acting Nitrates  []  Ranolazine    []  Heart Failure within 2 weeks ---->  Worst NYHA Class within 2 weeks    Class      []  I  []  II  []  III  []  IV    []  Cardiomyopathy/Left Ventricular Systolic Dysfunction (History or new onset)   []  Cardiogenic Shock within 24 hours  []  Cardiac Arrest within 24 hours  []  Pre- Operative Evaluation Before Non-Cardiac Surgery    STRESS OR IMAGING STUDIES PERFORMED WITHIN 6 MONTHS OF PROCEDURE (Select most recent test prior to procedure)    Test  (ST=Stress Test) Result    [] Standard Exercise ST (without imaging)  [] Stress Echo  [] ST with SPECT MPI   [] ST with CMR   [] Positive  []Negative  [] Indeterminant     [] High  [] Intermediate  [] Low     [] Cardiac CTA  (only pick one)    [] 3VD  [] 2VD  [] 1VD  [] No Disease  [] Indeterminant        [] Coronary Calcium Score     Calcium Score _______        Cabrera Yates DO PGY-6  Cardiology Fellow  Pager #[phone]           
butalbital: 2 times a day, As Needed  DilTIAZem (Eqv-Cardizem CD) 180 mg/24 hours oral capsule, extended release: 1 cap(s) orally once a day  Flagyl 500 mg oral tablet: 1 tab(s) orally once a day   freetext medication     -: 1 tab(s) orally every 6 hours, As needed, FOR HEADACHE  hydroCHLOROthiazide 12.5 mg oral tablet: 1 tab(s) orally once a day  levoFLOXacin 750 mg oral tablet: 1 tab(s) orally once a day   NexIUM:   polyethylene glycol 3350 oral powder for reconstitution: 17 gram(s) orally once a day, As Needed  Synthroid 137 mcg (0.137 mg) oral tablet: 1 tab(s) orally once a day  Vitamin D3: 2 tab(s) orally once a day  Xanax 0.25 mg oral tablet: 1 tab(s) orally once a day (at bedtime)

## 2021-11-08 NOTE — DISCHARGE NOTE PROVIDER - NSDCCPCAREPLAN_GEN_ALL_CORE_FT
PRINCIPAL DISCHARGE DIAGNOSIS  Diagnosis: Acute epigastric pain  Assessment and Plan of Treatment: You have an infection of your gallbladder and might eventually need it removed.  Please follow with your primary doctors.  Seek medical attention for any return or worsening of your symptoms.  Take antibiotics as prescribed.

## 2021-11-08 NOTE — CHART NOTE - NSCHARTNOTEFT_GEN_A_CORE
Patient seen and examined at bedside, reports that she had colic again RUQ last night after eating solid food.  D/W Patient, she refused labs this AM, insists on being discharged.  Reports she spoke w/her MD at [facility] and will go through the ER to have surgical evaluation done there.  Refusing outpatient FU with our service at this time.  Patient already discharged by medicine service, reports will promptly go to [facility] ER for evaluation.

## 2021-11-11 ENCOUNTER — APPOINTMENT (OUTPATIENT)
Age: 71
End: 2021-11-11

## 2021-11-12 DIAGNOSIS — Z86.711 PERSONAL HISTORY OF PULMONARY EMBOLISM: ICD-10-CM

## 2021-11-12 DIAGNOSIS — J44.9 CHRONIC OBSTRUCTIVE PULMONARY DISEASE, UNSPECIFIED: ICD-10-CM

## 2021-11-12 DIAGNOSIS — Z84.1 FAMILY HISTORY OF DISORDERS OF KIDNEY AND URETER: ICD-10-CM

## 2021-11-12 DIAGNOSIS — Z88.0 ALLERGY STATUS TO PENICILLIN: ICD-10-CM

## 2021-11-12 DIAGNOSIS — Z98.890 OTHER SPECIFIED POSTPROCEDURAL STATES: ICD-10-CM

## 2021-11-12 DIAGNOSIS — J45.909 UNSPECIFIED ASTHMA, UNCOMPLICATED: ICD-10-CM

## 2021-11-12 DIAGNOSIS — Z91.040 LATEX ALLERGY STATUS: ICD-10-CM

## 2021-11-12 DIAGNOSIS — G47.33 OBSTRUCTIVE SLEEP APNEA (ADULT) (PEDIATRIC): ICD-10-CM

## 2021-11-12 DIAGNOSIS — Z88.9 ALLERGY STATUS TO UNSPECIFIED DRUGS, MEDICAMENTS AND BIOLOGICAL SUBSTANCES: ICD-10-CM

## 2021-11-12 DIAGNOSIS — R10.9 UNSPECIFIED ABDOMINAL PAIN: ICD-10-CM

## 2021-11-12 DIAGNOSIS — K80.00 CALCULUS OF GALLBLADDER WITH ACUTE CHOLECYSTITIS WITHOUT OBSTRUCTION: ICD-10-CM

## 2021-11-12 DIAGNOSIS — I10 ESSENTIAL (PRIMARY) HYPERTENSION: ICD-10-CM

## 2021-11-12 DIAGNOSIS — I47.1 SUPRAVENTRICULAR TACHYCARDIA: ICD-10-CM

## 2021-11-12 DIAGNOSIS — Z80.3 FAMILY HISTORY OF MALIGNANT NEOPLASM OF BREAST: ICD-10-CM

## 2021-11-12 DIAGNOSIS — E03.9 HYPOTHYROIDISM, UNSPECIFIED: ICD-10-CM

## 2021-11-12 DIAGNOSIS — K21.9 GASTRO-ESOPHAGEAL REFLUX DISEASE WITHOUT ESOPHAGITIS: ICD-10-CM

## 2021-11-18 ENCOUNTER — EMERGENCY (EMERGENCY)
Facility: HOSPITAL | Age: 71
LOS: 0 days | Discharge: HOME | End: 2021-11-18
Attending: EMERGENCY MEDICINE | Admitting: EMERGENCY MEDICINE
Payer: MEDICARE

## 2021-11-18 VITALS
DIASTOLIC BLOOD PRESSURE: 61 MMHG | OXYGEN SATURATION: 98 % | HEART RATE: 74 BPM | RESPIRATION RATE: 18 BRPM | TEMPERATURE: 96 F | SYSTOLIC BLOOD PRESSURE: 114 MMHG

## 2021-11-18 VITALS
OXYGEN SATURATION: 98 % | DIASTOLIC BLOOD PRESSURE: 65 MMHG | SYSTOLIC BLOOD PRESSURE: 124 MMHG | HEART RATE: 88 BPM | HEIGHT: 69 IN | WEIGHT: 205.03 LBS | RESPIRATION RATE: 18 BRPM | TEMPERATURE: 97 F

## 2021-11-18 DIAGNOSIS — E03.9 HYPOTHYROIDISM, UNSPECIFIED: ICD-10-CM

## 2021-11-18 DIAGNOSIS — Z20.822 CONTACT WITH AND (SUSPECTED) EXPOSURE TO COVID-19: ICD-10-CM

## 2021-11-18 DIAGNOSIS — R00.0 TACHYCARDIA, UNSPECIFIED: ICD-10-CM

## 2021-11-18 DIAGNOSIS — G47.33 OBSTRUCTIVE SLEEP APNEA (ADULT) (PEDIATRIC): ICD-10-CM

## 2021-11-18 DIAGNOSIS — M79.89 OTHER SPECIFIED SOFT TISSUE DISORDERS: ICD-10-CM

## 2021-11-18 DIAGNOSIS — Z98.890 OTHER SPECIFIED POSTPROCEDURAL STATES: Chronic | ICD-10-CM

## 2021-11-18 DIAGNOSIS — R06.02 SHORTNESS OF BREATH: ICD-10-CM

## 2021-11-18 DIAGNOSIS — J44.9 CHRONIC OBSTRUCTIVE PULMONARY DISEASE, UNSPECIFIED: ICD-10-CM

## 2021-11-18 DIAGNOSIS — I10 ESSENTIAL (PRIMARY) HYPERTENSION: ICD-10-CM

## 2021-11-18 DIAGNOSIS — Z98.42 CATARACT EXTRACTION STATUS, LEFT EYE: Chronic | ICD-10-CM

## 2021-11-18 DIAGNOSIS — Z88.5 ALLERGY STATUS TO NARCOTIC AGENT: ICD-10-CM

## 2021-11-18 DIAGNOSIS — Z98.891 HISTORY OF UTERINE SCAR FROM PREVIOUS SURGERY: Chronic | ICD-10-CM

## 2021-11-18 DIAGNOSIS — R00.2 PALPITATIONS: ICD-10-CM

## 2021-11-18 DIAGNOSIS — Z91.040 LATEX ALLERGY STATUS: ICD-10-CM

## 2021-11-18 DIAGNOSIS — Z88.0 ALLERGY STATUS TO PENICILLIN: ICD-10-CM

## 2021-11-18 DIAGNOSIS — Z88.8 ALLERGY STATUS TO OTHER DRUGS, MEDICAMENTS AND BIOLOGICAL SUBSTANCES STATUS: ICD-10-CM

## 2021-11-18 LAB
ALBUMIN SERPL ELPH-MCNC: 4.2 G/DL — SIGNIFICANT CHANGE UP (ref 3.5–5.2)
ALP SERPL-CCNC: 95 U/L — SIGNIFICANT CHANGE UP (ref 30–115)
ALT FLD-CCNC: 31 U/L — SIGNIFICANT CHANGE UP (ref 0–41)
ANION GAP SERPL CALC-SCNC: 13 MMOL/L — SIGNIFICANT CHANGE UP (ref 7–14)
AST SERPL-CCNC: 22 U/L — SIGNIFICANT CHANGE UP (ref 0–41)
BASOPHILS # BLD AUTO: 0.04 K/UL — SIGNIFICANT CHANGE UP (ref 0–0.2)
BASOPHILS NFR BLD AUTO: 0.5 % — SIGNIFICANT CHANGE UP (ref 0–1)
BILIRUB SERPL-MCNC: 0.2 MG/DL — SIGNIFICANT CHANGE UP (ref 0.2–1.2)
BUN SERPL-MCNC: 20 MG/DL — SIGNIFICANT CHANGE UP (ref 10–20)
CALCIUM SERPL-MCNC: 9 MG/DL — SIGNIFICANT CHANGE UP (ref 8.5–10.1)
CHLORIDE SERPL-SCNC: 95 MMOL/L — LOW (ref 98–110)
CO2 SERPL-SCNC: 26 MMOL/L — SIGNIFICANT CHANGE UP (ref 17–32)
CREAT SERPL-MCNC: 1 MG/DL — SIGNIFICANT CHANGE UP (ref 0.7–1.5)
D DIMER BLD IA.RAPID-MCNC: 296 NG/ML DDU — HIGH (ref 0–230)
EOSINOPHIL # BLD AUTO: 0.17 K/UL — SIGNIFICANT CHANGE UP (ref 0–0.7)
EOSINOPHIL NFR BLD AUTO: 2.1 % — SIGNIFICANT CHANGE UP (ref 0–8)
GLUCOSE SERPL-MCNC: 109 MG/DL — HIGH (ref 70–99)
HCT VFR BLD CALC: 40.4 % — SIGNIFICANT CHANGE UP (ref 37–47)
HGB BLD-MCNC: 13.5 G/DL — SIGNIFICANT CHANGE UP (ref 12–16)
IMM GRANULOCYTES NFR BLD AUTO: 0.5 % — HIGH (ref 0.1–0.3)
LYMPHOCYTES # BLD AUTO: 1.3 K/UL — SIGNIFICANT CHANGE UP (ref 1.2–3.4)
LYMPHOCYTES # BLD AUTO: 16.4 % — LOW (ref 20.5–51.1)
MCHC RBC-ENTMCNC: 29.9 PG — SIGNIFICANT CHANGE UP (ref 27–31)
MCHC RBC-ENTMCNC: 33.4 G/DL — SIGNIFICANT CHANGE UP (ref 32–37)
MCV RBC AUTO: 89.4 FL — SIGNIFICANT CHANGE UP (ref 81–99)
MONOCYTES # BLD AUTO: 0.85 K/UL — HIGH (ref 0.1–0.6)
MONOCYTES NFR BLD AUTO: 10.7 % — HIGH (ref 1.7–9.3)
NEUTROPHILS # BLD AUTO: 5.53 K/UL — SIGNIFICANT CHANGE UP (ref 1.4–6.5)
NEUTROPHILS NFR BLD AUTO: 69.8 % — SIGNIFICANT CHANGE UP (ref 42.2–75.2)
NRBC # BLD: 0 /100 WBCS — SIGNIFICANT CHANGE UP (ref 0–0)
PLATELET # BLD AUTO: 283 K/UL — SIGNIFICANT CHANGE UP (ref 130–400)
POTASSIUM SERPL-MCNC: 3.8 MMOL/L — SIGNIFICANT CHANGE UP (ref 3.5–5)
POTASSIUM SERPL-SCNC: 3.8 MMOL/L — SIGNIFICANT CHANGE UP (ref 3.5–5)
PROT SERPL-MCNC: 7 G/DL — SIGNIFICANT CHANGE UP (ref 6–8)
RBC # BLD: 4.52 M/UL — SIGNIFICANT CHANGE UP (ref 4.2–5.4)
RBC # FLD: 14 % — SIGNIFICANT CHANGE UP (ref 11.5–14.5)
SARS-COV-2 RNA SPEC QL NAA+PROBE: SIGNIFICANT CHANGE UP
SODIUM SERPL-SCNC: 134 MMOL/L — LOW (ref 135–146)
TROPONIN T SERPL-MCNC: <0.01 NG/ML — SIGNIFICANT CHANGE UP
WBC # BLD: 7.93 K/UL — SIGNIFICANT CHANGE UP (ref 4.8–10.8)
WBC # FLD AUTO: 7.93 K/UL — SIGNIFICANT CHANGE UP (ref 4.8–10.8)

## 2021-11-18 PROCEDURE — 93010 ELECTROCARDIOGRAM REPORT: CPT

## 2021-11-18 PROCEDURE — 99285 EMERGENCY DEPT VISIT HI MDM: CPT

## 2021-11-18 PROCEDURE — 93970 EXTREMITY STUDY: CPT | Mod: 26

## 2021-11-18 PROCEDURE — 71275 CT ANGIOGRAPHY CHEST: CPT | Mod: 26,MA

## 2021-11-18 PROCEDURE — 71045 X-RAY EXAM CHEST 1 VIEW: CPT | Mod: 26

## 2021-11-18 RX ORDER — DIPHENHYDRAMINE HCL 50 MG
50 CAPSULE ORAL ONCE
Refills: 0 | Status: COMPLETED | OUTPATIENT
Start: 2021-11-18 | End: 2021-11-18

## 2021-11-18 RX ORDER — ACETAMINOPHEN 500 MG
650 TABLET ORAL ONCE
Refills: 0 | Status: COMPLETED | OUTPATIENT
Start: 2021-11-18 | End: 2021-11-18

## 2021-11-18 RX ADMIN — Medication 650 MILLIGRAM(S): at 20:20

## 2021-11-18 RX ADMIN — Medication 50 MILLIGRAM(S): at 20:20

## 2021-11-18 NOTE — ED PROVIDER NOTE - PROGRESS NOTE DETAILS
Authored by Anitha Tariq DO: Signed out to NESHA Fierro. US IV placed, pending CT PE study, pt reports "choking" during last CT, without rash or SOB, so will premedicate with benadryl, pt refusing solumedrol at this time.

## 2021-11-18 NOTE — ED PROVIDER NOTE - NS ED ROS FT
Review of Systems:  CONSTITUTIONAL: No fever, No diaphoresis   SKIN: No rash  HEMATOLOGIC: No abnormal bleeding   EYES: No eye pain, No blurred vision  ENT: No sore throat, No neck pain, No rhinorrhea  RESPIRATORY: +shortness of breath, No cough  CARDIAC: No chest pain, +palpitations  GI: No abdominal pain, No nausea, No vomiting, No diarrhea, No constipation, No bright red blood per rectum or melena. No flank pain.   : No dysuria, frequency, hematuria.   MUSCULOSKELETAL: No joint paint, No swelling, No back pain  NEUROLOGIC: No numbness, No focal weakness, No headache, No dizziness  All other systems negative, unless specified in HPI

## 2021-11-18 NOTE — ED PROVIDER NOTE - CLINICAL SUMMARY MEDICAL DECISION MAKING FREE TEXT BOX
pt with history of HTN Hypothyroidism ETHAN CPAP COPD atrial tachycardia provoked PE no longer on AC presenting with palpitations, concern for PE. also noted b/l LE swelling now improved. Well appearing, NAD, non toxic. NCAT PERRLA EOMI neck supple non tender normal wob regular abdomen s nt nd no rebound no guarding WWPx4 neuro non focal  no LE edema. labs ekg imaging reviewed. Aware of all results, given a copy of all available results, comfortable with discharge and follow-up outpatient, strict return precautions given. Endorses understanding of all of this and aware that they can return at any time for new or concerning symptoms. No further questions or concerns at this time

## 2021-11-18 NOTE — ED PROVIDER NOTE - PATIENT PORTAL LINK FT
You can access the FollowMyHealth Patient Portal offered by [facility] by registering at the following website: [email or web address]. By joining [employer]’s FollowMyHealth portal, you will also be able to view your health information using other applications (apps) compatible with our system.

## 2021-11-18 NOTE — ED PROVIDER NOTE - OBJECTIVE STATEMENT
72 y/o F PMHx atrial tachycardia, ETHAN on CPAP, COPD, hypothyroidism, HTN, PE 2019 off Eliquis at this time, recent admission here and [facility] for cholecystitis refusal of surgery or percutaneous intervention, presents to ED requesting a d-dimer. Pt reports she is scared with her recent admissions that she has a PE at this time, has been monitoring her O2 sat at home and it has been wnl. Pt reports mild intermittent palpitations, similar to baseline, denies CP. Reports SOB on exertion for past few days, no leg swelling, recent travel. No fevers.

## 2021-11-18 NOTE — ED PROVIDER NOTE - CHIEF COMPLAINT
0dFemale, born at 40.3 weeks gestation via Normal spontaneous vaginal delivery to a 31 year old,   O+ mother. RI, RPR, NR, HIV NR, HbSAg neg, GBS negative. Maternal hx significant for breast augmentation ()  Apgar 9/9, Infant (blood type dmitriy negative). Birth Wt: 3720 grams (8#3)  Length: 20" HC:    (Exclusively BF)  Formula feeding. Due to void, Due to stool The patient is a 71y Female complaining of rapid heart beat. 0dFemale, born at 40.3 weeks gestation via Normal spontaneous vaginal delivery to a 31 year old,   O+ mother. RI, RPR, NR, HIV NR, HbSAg neg, GBS negative. EOS=0.06. Maternal hx significant for breast augmentation ()  Apgar 9/9, Infant O+, RUBIA neg. Birth Wt: 3720 grams (8#3)  Length: 20" HC:    (Exclusively BF)  Formula feeding. Due to void, Due to stool 0dFemale, born at 40.3 weeks gestation via Normal spontaneous vaginal delivery to a 31 year old,   O+ mother. RI, RPR, NR, HIV NR, HbSAg neg, GBS negative. EOS=0.06. Maternal hx significant for breast augmentation ()  Apgar 99, Infant O+, RUBIA neg. Birth Wt: 3720 grams (8#3)  Length: 20" HC:  33.5cm  Formula feeding. Due to void, Due to stool 0dFemale, born at 40.3 weeks gestation via Normal spontaneous vaginal delivery to a 31 year old,   O+ mother. RI, RPR, NR, HIV NR, HbSAg neg, GBS negative. EOS=0.06. Maternal hx significant for breast augmentation ()  Apgar 9/9, CAN x 1. Infant O+, RUBIA neg. Birth Wt: 3720 grams (8#3)  Length: 20" HC:  33.5cm  Formula feeding. Due to void, Due to stool

## 2021-11-18 NOTE — ED PROVIDER NOTE - PHYSICAL EXAMINATION
CONSTITUTIONAL: Well-developed; well-nourished; in no acute distress.   SKIN: warm, dry  HEAD: Normocephalic; atraumatic.  EYES: no conjunctival injection. PERRLA. EOMI.   ENT: No nasal discharge; airway clear.  NECK: Supple; non tender.  CARD: S1, S2 normal; Regular rate and rhythm.   RESP: No wheezes, rales or rhonchi.  ABD: soft ntnd.   EXT: Normal ROM.  No lower extremity edema.   LYMPH: No acute cervical adenopathy.  NEURO: Alert, oriented, grossly unremarkable. No FND.   PSYCH: Cooperative, appropriate.

## 2021-12-01 ENCOUNTER — APPOINTMENT (OUTPATIENT)
Age: 71
End: 2021-12-01
Payer: MEDICARE

## 2021-12-01 PROCEDURE — 99213 OFFICE O/P EST LOW 20 MIN: CPT | Mod: 95

## 2021-12-01 NOTE — HISTORY OF PRESENT ILLNESS
[Home] : at home, [unfilled] , at the time of the visit. [Medical Office: ([facility])___] : at the medical office located in  [Verbal consent obtained from patient] : the patient, [unfilled]

## 2021-12-01 NOTE — ASSESSMENT
[FreeTextEntry1] : Severe ETHAN on APAP \par SP new APAP delivery \par Intermittent asthma well controlled \par HO VTE after shoulder surgery, now off AC

## 2022-02-27 ENCOUNTER — EMERGENCY (EMERGENCY)
Facility: HOSPITAL | Age: 72
LOS: 0 days | Discharge: HOME | End: 2022-02-27
Attending: EMERGENCY MEDICINE | Admitting: EMERGENCY MEDICINE
Payer: MEDICARE

## 2022-02-27 VITALS
SYSTOLIC BLOOD PRESSURE: 144 MMHG | TEMPERATURE: 99 F | WEIGHT: 195.99 LBS | RESPIRATION RATE: 18 BRPM | DIASTOLIC BLOOD PRESSURE: 66 MMHG | HEART RATE: 71 BPM | OXYGEN SATURATION: 100 % | HEIGHT: 69 IN

## 2022-02-27 DIAGNOSIS — G43.009 MIGRAINE WITHOUT AURA, NOT INTRACTABLE, WITHOUT STATUS MIGRAINOSUS: ICD-10-CM

## 2022-02-27 DIAGNOSIS — Z98.891 HISTORY OF UTERINE SCAR FROM PREVIOUS SURGERY: Chronic | ICD-10-CM

## 2022-02-27 DIAGNOSIS — Z98.890 OTHER SPECIFIED POSTPROCEDURAL STATES: Chronic | ICD-10-CM

## 2022-02-27 DIAGNOSIS — R51.9 HEADACHE, UNSPECIFIED: ICD-10-CM

## 2022-02-27 DIAGNOSIS — Z98.42 CATARACT EXTRACTION STATUS, LEFT EYE: Chronic | ICD-10-CM

## 2022-02-27 PROCEDURE — 99283 EMERGENCY DEPT VISIT LOW MDM: CPT | Mod: GC

## 2022-02-27 NOTE — ED PROVIDER NOTE - CLINICAL SUMMARY MEDICAL DECISION MAKING FREE TEXT BOX
Atypical headache improved with migraine medication at home, neurological exam is unremarkable patient advised outpatient follow-up.

## 2022-02-27 NOTE — ED PROVIDER NOTE - OBJECTIVE STATEMENT
71F presents with burning headache x 5 days, notes she had laser eye surgery 5 days ago on left eye, denies any changes in vision/fever/conjunctivitis/vomiting.

## 2022-02-27 NOTE — ED PROVIDER NOTE - PATIENT PORTAL LINK FT
You can access the FollowMyHealth Patient Portal offered by [facility] by registering at the following website: [email or web address]. By joining [email or web address]’s FollowMyHealth portal, you will also be able to view your health information using other applications (apps) compatible with our system.

## 2022-02-27 NOTE — ED PROVIDER NOTE - PHYSICAL EXAMINATION
Vital Signs: I have reviewed the initial vital signs.  Constitutional: well-nourished, appears stated age, no acute distress.  HEENT: Airway patent, moist MM, no erythema/swelling/deformity of oral structures. EOMI, PERRLA. Ocular pressure is 13.   CV: regular rate, regular rhythm, well-perfused extremities, 2+ b/l DP and radial pulses equal.  Lungs: BCTA, no increased WOB.  ABD: NTND, no guarding or rebound, no pulsatile mass, no hernias, no flank pain.   MSK: Neck supple, nontender, nl ROM, no stepoff. Chest nontender. Back nontender in TLS spine or to b/l bony structures. Ext nontender, nl rom, no deformity.   INTEG: Skin warm, dry, no rash.  NEURO: A&Ox3, moving all extremities, normal speech  PSYCH: Calm, cooperative, normal affect and interaction.

## 2022-02-27 NOTE — ED ADULT TRIAGE NOTE - CCCP TRG CHIEF CMPLNT
PCP verified Smooth Bob MD  Denies known Latex allergy or symptoms of Latex sensitivity.  Tobacco verified.  Social history verified.  History verified.  Medications reviewed and updated.    POH:  Primary Open Angle Glaucoma - mild, cataracts both  PMH:  Acid reflux    Jun Brink is a 78 year old male presenting for a pressure check. No new ocular complaints  Is using latanoprost ou nightly. Last used at 8 pm last night  
S: above noted;  4 mo Ta check    --see VF/OCT 3/18**    gtts:   Latanoprost OU qhs **    SLE :       L/L/L:  OD: nl //OS: nl       CONJ:  OD: clear  //OS: clear          CORNEA:  OD: clear //OS: clear       AC:  OD: d,q  // OS: d,q; II-III OU       IRIS:  OD: clear  //OS: clear       LENS:  OD: 2+ NS  //OS: 2+ NS    C/D  0.6-65 /0.65 w temp slope OU    Gonio:   Gr II-III --> IV w compression x 360 OU, - PAS  2+ pigm TM      A:  see diagnosis below; tech note reviewed and accepted    P:  see orders/disposition    COMMENT: pt concerned...reassured, brochure(s) given and questions answered    COAG OS/OU  + FHx**  Mother, Sister, Bro  Sister ? s/p LPI as well    --slt mixed angles but mostly OPEN      cont Glc gtts OU**  Latanoprost OU qhs   nice decr OU: ~ 6-11 mm     Ta 19 OU 4/18  T Max 23 ou ('14)  Thick Pachs noted    
headache

## 2022-02-27 NOTE — ED PROVIDER NOTE - NSFOLLOWUPCLINICS_GEN_ALL_ED_FT
[facility]  Ophthalmolgy  [address], [address]  [place] [address]  Phone: [phone]  Fax:   Follow Up Time: 4-6 Days    [facility]  Neurology  [address], [address]  [place] [address]  Phone: [phone]  Fax:   Follow Up Time: 4-6 Days

## 2022-02-27 NOTE — ED ADULT NURSE NOTE - SCORE
What Type Of Note Output Would You Prefer (Optional)?: Standard Output How Severe Is Your Skin Lesion?: mild Has Your Skin Lesion Been Treated?: been treated Is This A New Presentation, Or A Follow-Up?: Skin Lesion 1

## 2022-02-27 NOTE — ED PROVIDER NOTE - ATTENDING CONTRIBUTION TO CARE
71-year-old female here for burning headache for 5 days which is improved with her migraine medication.  No weakness numbness tingling vision changes.  Of note the patient did have left eye cataract surgery 5 days ago.  Agree with above exam.  Impression  Atypical headache improved with migraine medication at home, neurological exam is unremarkable patient advised outpatient follow-up.

## 2022-02-27 NOTE — ED PROVIDER NOTE - CARE PROVIDER_API CALL
Sonny Ramey (MD)  Anesthesiology; Pain Medicine  [address] [place] [address]  Phone: [phone]  Fax: [phone]  Follow Up Time: 4-6 Days

## 2022-02-27 NOTE — ED PROVIDER NOTE - NSFOLLOWUPINSTRUCTIONS_ED_ALL_ED_FT
Migraine Headache    A migraine headache is an intense, throbbing pain on one or both sides of your head. A migraine can last for 30 minutes to several hours.     CAUSES  The exact cause of a migraine headache is not always known. However, a migraine may be caused when nerves in the brain become irritated and release chemicals that cause inflammation. This causes pain.    Certain things may also trigger migraines, such as:    Alcohol.  Smoking.  Stress.   Menstruation.  Aged cheeses.  Foods or drinks that contain nitrates, glutamate, aspartame, or tyramine.   Lack of sleep.   Chocolate.   Caffeine.  Hunger.  Physical exertion.  Fatigue.   Medicines used to treat chest pain (nitroglycerine), birth control pills, estrogen, and some blood pressure medicines.    SIGNS AND SYMPTOMS  Pain on one or both sides of your head.  Pulsating or throbbing pain.  Severe pain that prevents daily activities.  Pain that is aggravated by any physical activity.  Nausea, vomiting, or both.   Dizziness.   Pain with exposure to bright lights, loud noises, or activity.  General sensitivity to bright lights, loud noises, or smells.    Before you get a migraine, you may get warning signs that a migraine is coming (aura). An aura may include:    Seeing flashing lights.  Seeing bright spots, halos, or zigzag lines.   Having tunnel vision or blurred vision.   Having feelings of numbness or tingling.   Having trouble talking.  Having muscle weakness.     DIAGNOSIS  A migraine headache is often diagnosed based on:    Symptoms.   Physical exam.  A CT scan or MRI of your head. These imaging tests cannot diagnose migraines, but they can help rule out other causes of headaches.     TREATMENT  Medicines may be given for pain and nausea. Medicines can also be given to help prevent recurrent migraines.     HOME CARE INSTRUCTIONS  Only take over-the-counter or prescription medicines for pain or discomfort as directed by your health care provider. The use of long-term narcotics is not recommended.   Lie down in a dark, quiet room when you have a migraine.   Keep a journal to find out what may trigger your migraine headaches. For example, write down:  What you eat and drink.  How much sleep you get.  Any change to your diet or medicines.  Limit alcohol consumption.  Quit smoking if you smoke.   Get 7–9 hours of sleep, or as recommended by your health care provider.  Limit stress.   Keep lights dim if bright lights bother you and make your migraines worse.    SEEK IMMEDIATE MEDICAL CARE IF:  Your migraine becomes severe.  You have a fever.  You have a stiff neck.  You have vision loss.  You have muscular weakness or loss of muscle control.  You start losing your balance or have trouble walking.  You feel faint or pass out.  You have severe symptoms that are different from your first symptoms.     MAKE SURE YOU:  Understand these instructions.   Will watch your condition.  Will get help right away if you are not doing well or get worse.    ADDITIONAL NOTES AND INSTRUCTIONS    Please follow up with your Primary MD in 24-48 hr.  Seek immediate medical care for any new/worsening signs or symptoms.

## 2022-03-03 NOTE — ED ADULT TRIAGE NOTE - BP NONINVASIVE DIASTOLIC (MM HG)
Problem: Pain  Goal: *Control of Pain  Outcome: Progressing Towards Goal  Note: Patient utilizing oral pain medications as needed for numeric pain >/= 5. Pt within comfort level, able to perform ADLs.   Goal: *PALLIATIVE CARE:  Alleviation of Pain  3/3/2022 1501 by Uli Figueroa RN  Outcome: Progressing Towards Goal  3/3/2022 1459 by Uli Figueroa RN  Outcome: Progressing Towards Goal     Problem: Patient Education: Go to Patient Education Activity  Goal: Patient/Family Education  Outcome: Progressing Towards Goal 78

## 2022-03-04 ENCOUNTER — OUTPATIENT (OUTPATIENT)
Dept: OUTPATIENT SERVICES | Facility: HOSPITAL | Age: 72
LOS: 1 days | Discharge: HOME | End: 2022-03-04
Payer: MEDICARE

## 2022-03-04 DIAGNOSIS — Z98.890 OTHER SPECIFIED POSTPROCEDURAL STATES: Chronic | ICD-10-CM

## 2022-03-04 DIAGNOSIS — Z12.31 ENCOUNTER FOR SCREENING MAMMOGRAM FOR MALIGNANT NEOPLASM OF BREAST: ICD-10-CM

## 2022-03-04 DIAGNOSIS — Z98.42 CATARACT EXTRACTION STATUS, LEFT EYE: Chronic | ICD-10-CM

## 2022-03-04 DIAGNOSIS — Z98.891 HISTORY OF UTERINE SCAR FROM PREVIOUS SURGERY: Chronic | ICD-10-CM

## 2022-03-04 PROCEDURE — 77063 BREAST TOMOSYNTHESIS BI: CPT | Mod: 26

## 2022-03-04 PROCEDURE — 77067 SCR MAMMO BI INCL CAD: CPT | Mod: 26

## 2022-03-07 DIAGNOSIS — M89.9 DISORDER OF BONE, UNSPECIFIED: ICD-10-CM

## 2022-03-07 DIAGNOSIS — Z87.310 PERSONAL HISTORY OF (HEALED) OSTEOPOROSIS FRACTURE: ICD-10-CM

## 2022-03-07 DIAGNOSIS — Z13.820 ENCOUNTER FOR SCREENING FOR OSTEOPOROSIS: ICD-10-CM

## 2022-03-07 DIAGNOSIS — Z78.0 ASYMPTOMATIC MENOPAUSAL STATE: ICD-10-CM

## 2022-03-30 ENCOUNTER — EMERGENCY (EMERGENCY)
Facility: HOSPITAL | Age: 72
LOS: 0 days | Discharge: LEFT BEFORE DISCHARGE | End: 2022-03-30
Admitting: EMERGENCY MEDICINE
Payer: MEDICARE

## 2022-03-30 VITALS
TEMPERATURE: 97 F | HEIGHT: 69 IN | WEIGHT: 197.09 LBS | HEART RATE: 80 BPM | SYSTOLIC BLOOD PRESSURE: 142 MMHG | DIASTOLIC BLOOD PRESSURE: 84 MMHG | OXYGEN SATURATION: 97 % | RESPIRATION RATE: 18 BRPM

## 2022-03-30 DIAGNOSIS — Z98.891 HISTORY OF UTERINE SCAR FROM PREVIOUS SURGERY: Chronic | ICD-10-CM

## 2022-03-30 DIAGNOSIS — R21 RASH AND OTHER NONSPECIFIC SKIN ERUPTION: ICD-10-CM

## 2022-03-30 DIAGNOSIS — Z98.42 CATARACT EXTRACTION STATUS, LEFT EYE: Chronic | ICD-10-CM

## 2022-03-30 DIAGNOSIS — Z53.21 PROCEDURE AND TREATMENT NOT CARRIED OUT DUE TO PATIENT LEAVING PRIOR TO BEING SEEN BY HEALTH CARE PROVIDER: ICD-10-CM

## 2022-03-30 DIAGNOSIS — Z98.890 OTHER SPECIFIED POSTPROCEDURAL STATES: Chronic | ICD-10-CM

## 2022-03-30 PROCEDURE — L9991: CPT

## 2022-03-30 NOTE — PHYSICAL THERAPY INITIAL EVALUATION ADULT - PHYSICAL ASSIST/NONPHYSICAL ASSIST: STAND/SIT, REHAB EVAL
pt mumtaz from [facility]; as per staff pt "is not responding to anyone" x 1 hour, normally ambulatory and talkative; blood sugar is 205
1 person assist/hand placement and technique for safety/verbal cues

## 2022-03-30 NOTE — ED ADULT TRIAGE NOTE - CHIEF COMPLAINT QUOTE
Patient states "I have a rash under my left breast I think its shingles and I sprained my left wrist".

## 2022-04-29 ENCOUNTER — EMERGENCY (EMERGENCY)
Facility: HOSPITAL | Age: 72
LOS: 0 days | Discharge: HOME | End: 2022-04-29
Attending: EMERGENCY MEDICINE | Admitting: EMERGENCY MEDICINE
Payer: MEDICARE

## 2022-04-29 VITALS
TEMPERATURE: 97 F | DIASTOLIC BLOOD PRESSURE: 61 MMHG | HEART RATE: 78 BPM | OXYGEN SATURATION: 100 % | RESPIRATION RATE: 18 BRPM | SYSTOLIC BLOOD PRESSURE: 126 MMHG

## 2022-04-29 VITALS — WEIGHT: 190.04 LBS | HEIGHT: 69 IN

## 2022-04-29 DIAGNOSIS — I10 ESSENTIAL (PRIMARY) HYPERTENSION: ICD-10-CM

## 2022-04-29 DIAGNOSIS — Z98.42 CATARACT EXTRACTION STATUS, LEFT EYE: ICD-10-CM

## 2022-04-29 DIAGNOSIS — Z91.040 LATEX ALLERGY STATUS: ICD-10-CM

## 2022-04-29 DIAGNOSIS — Z87.39 PERSONAL HISTORY OF OTHER DISEASES OF THE MUSCULOSKELETAL SYSTEM AND CONNECTIVE TISSUE: ICD-10-CM

## 2022-04-29 DIAGNOSIS — E03.9 HYPOTHYROIDISM, UNSPECIFIED: ICD-10-CM

## 2022-04-29 DIAGNOSIS — Z88.6 ALLERGY STATUS TO ANALGESIC AGENT: ICD-10-CM

## 2022-04-29 DIAGNOSIS — K21.9 GASTRO-ESOPHAGEAL REFLUX DISEASE WITHOUT ESOPHAGITIS: ICD-10-CM

## 2022-04-29 DIAGNOSIS — M25.532 PAIN IN LEFT WRIST: ICD-10-CM

## 2022-04-29 DIAGNOSIS — Z88.8 ALLERGY STATUS TO OTHER DRUGS, MEDICAMENTS AND BIOLOGICAL SUBSTANCES STATUS: ICD-10-CM

## 2022-04-29 DIAGNOSIS — Z88.4 ALLERGY STATUS TO ANESTHETIC AGENT: ICD-10-CM

## 2022-04-29 DIAGNOSIS — G43.909 MIGRAINE, UNSPECIFIED, NOT INTRACTABLE, WITHOUT STATUS MIGRAINOSUS: ICD-10-CM

## 2022-04-29 DIAGNOSIS — Z98.890 OTHER SPECIFIED POSTPROCEDURAL STATES: Chronic | ICD-10-CM

## 2022-04-29 DIAGNOSIS — Z88.5 ALLERGY STATUS TO NARCOTIC AGENT: ICD-10-CM

## 2022-04-29 DIAGNOSIS — I34.1 NONRHEUMATIC MITRAL (VALVE) PROLAPSE: ICD-10-CM

## 2022-04-29 DIAGNOSIS — Z98.42 CATARACT EXTRACTION STATUS, LEFT EYE: Chronic | ICD-10-CM

## 2022-04-29 DIAGNOSIS — Z88.0 ALLERGY STATUS TO PENICILLIN: ICD-10-CM
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
## 2022-04-29 DIAGNOSIS — Z98.891 HISTORY OF UTERINE SCAR FROM PREVIOUS SURGERY: Chronic | ICD-10-CM
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
## 2022-04-29 DIAGNOSIS — G47.33 OBSTRUCTIVE SLEEP APNEA (ADULT) (PEDIATRIC): ICD-10-CM

## 2022-04-29 DIAGNOSIS — Z99.89 DEPENDENCE ON OTHER ENABLING MACHINES AND DEVICES: ICD-10-CM

## 2022-04-29 PROCEDURE — 99283 EMERGENCY DEPT VISIT LOW MDM: CPT | Mod: FS

## 2022-04-29 PROCEDURE — 73130 X-RAY EXAM OF HAND: CPT | Mod: 26,LT

## 2022-04-29 PROCEDURE — 73110 X-RAY EXAM OF WRIST: CPT | Mod: 26,LT

## 2022-04-29 NOTE — ED PROVIDER NOTE - OBJECTIVE STATEMENT
72 yo F pmhx GERD, hypothyroidism presenting to the ED for evaluation of L wrist pain x few days, pt states a few days ago twisted the cap to a jar when opening it and noticed the pain shortly after, pt states pain is mild, intermittent, worse with movement and palpation. Denies any direct trauma, numbness/tingling, weakness, chest pain, sob.

## 2022-04-29 NOTE — ED PROVIDER NOTE - CARE PROVIDER_API CALL
Tyron Hamilton)  Orthopaedic Surgery  [address] [place] [address]  Phone: [phone]  Fax: [phone]  Follow Up Time: 1-3 Days

## 2022-04-29 NOTE — ED PROVIDER NOTE - CLINICAL SUMMARY MEDICAL DECISION MAKING FREE TEXT BOX
Patient presented with L wrist pain x several days s/p twisting a jar top. Otherwise afebrile, Hd stable, neurovascularly intact, full ROM all joints but mildly tender on L wrist exam posteriorly. No snuffbox tenderness. Xrays obtained and negative for acute fx or dislocation. Will splint, provide outpatient ortho follow up. Patient agreeable with plan. Agrees to return to ED for any new or worsening symptoms.

## 2022-04-29 NOTE — ED PROVIDER NOTE - PHYSICAL EXAMINATION
GENERAL: Well-nourished, Well-developed. NAD.  HEAD: No visible or palpable bumps or hematomas. No ecchymosis behind ears B/L.  Eyes: PERRLA, EOMI. No asymmetry. No nystagmus. No conjunctival injection. Non-icteric sclera.  ENMT: MMM.   Neck: Supple. FROM  CVS: RRR  MSK: FROM L hand and wrist, no ttp, no swelling. no deformity  Skin: Warm, Dry. No rashes or lesions. Good cap refill < 2 sec B/L.  EXT: NVI  Neuro: AA&O x 3. Sensation grossly intact. Strength 5/5 B/L. Gait within normal limits.

## 2022-04-29 NOTE — ED PROVIDER NOTE - NS ED ROS FT
Constitutional: (-) fever (-) malaise (-) diaphoresis (-) chills   Eyes: (-) visual changes (-) eye pain (-) eye discharge (-) photophobia (-) FB sensation  Cardiac: (-) chest pain  (-) palpitations (-) syncope (-) edema  Respiratory: (-) SOB (-) TUBBS  GI: (-) nausea (-) vomiting  MS: (+)L wrist pain  Neuro: (-) headache (-) dizziness (-) numbness/tingling to extremities B/L (-) weakness  Skin: (-) rash (-) laceration    Except as documented in the HPI, all other systems are negative.
no back pain, no gout, no musculoskeletal pain, no neck pain, and no weakness.

## 2022-04-29 NOTE — ED PROVIDER NOTE - NS ED ATTENDING STATEMENT MOD
This was a shared visit with the TORY. I reviewed and verified the documentation and independently performed the documented:

## 2022-04-29 NOTE — ED PROVIDER NOTE - NPI NUMBER (FOR SYS[facility]MIN USE ONLY) :
[[id number]] Doxepin Pregnancy And Lactation Text: This medication is Pregnancy Category C and it isn't known if it is safe during pregnancy. It is also excreted in breast milk and breast feeding isn't recommended.

## 2022-04-29 NOTE — ED PROVIDER NOTE - PATIENT PORTAL LINK FT
You can access the FollowMyHealth Patient Portal offered by [facility] by registering at the following website: [email or web address]. By joining [employer]’s FollowMyHealth portal, you will also be able to view your health information using other applications (apps) compatible with our system.

## 2022-04-29 NOTE — ED ADULT TRIAGE NOTE - PAIN RATING/NUMBER SCALE (0-10): ACTIVITY
8 Quality 226: Preventive Care And Screening: Tobacco Use: Screening And Cessation Intervention: Patient screened for tobacco use and is an ex/non-smoker Quality 111:Pneumonia Vaccination Status For Older Adults: Pneumococcal Vaccination Previously Received Detail Level: Detailed Quality 110: Preventive Care And Screening: Influenza Immunization: Influenza Immunization Administered during Influenza season 122

## 2022-06-02 ENCOUNTER — APPOINTMENT (OUTPATIENT)
Age: 72
End: 2022-06-02
Payer: MEDICARE

## 2022-06-02 VITALS
RESPIRATION RATE: 12 BRPM | HEIGHT: 69 IN | HEART RATE: 68 BPM | OXYGEN SATURATION: 97 % | BODY MASS INDEX: 30.21 KG/M2 | SYSTOLIC BLOOD PRESSURE: 130 MMHG | WEIGHT: 204 LBS | DIASTOLIC BLOOD PRESSURE: 70 MMHG

## 2022-06-02 PROCEDURE — 99214 OFFICE O/P EST MOD 30 MIN: CPT

## 2022-06-02 NOTE — ASSESSMENT
[FreeTextEntry1] : Severe ETHAN on APAP \par Intermittent asthma well controlled \par HO VTE after shoulder surgery, now off AC

## 2022-06-02 NOTE — HISTORY OF PRESENT ILLNESS
[Intermittent] : intermittent [Doing Well] : doing well [Well Controlled] : Well controlled [Checks Regularly] : The patient checks ~his/her~ peak flow regularly [Good Control] : peak flow has been good [Adherent] : the patient is adherent with ~his/her~ medication regimen [Goals--Doing Well] : the patient is doing well with ~his/her~ asthma goals [Follow-Up - Routine Clinic] : a routine clinic follow-up of [None] : No associated symptoms are reported [Good Compliance] : good compliance with treatment [Good Tolerance] : good tolerance of treatment [Good Symptom Control] : good symptom control [de-identified] : APAP

## 2022-06-21 NOTE — PROGRESS NOTE ADULT - PROBLEM/PLAN-3
Called first time, someone picked up phone and hung up. Called back the second time, someone answered and I asked to speak to patient. Lady on phone stated this is Annita Jalloh ? And hung the phone up. I will try again later.
DISPLAY PLAN FREE TEXT

## 2022-07-25 PROBLEM — G43.909 MIGRAINES: Status: RESOLVED | Noted: 2019-12-10 | Resolved: 2022-07-25

## 2022-07-25 PROBLEM — Z87.19 HISTORY OF HIATAL HERNIA: Status: RESOLVED | Noted: 2019-12-10 | Resolved: 2022-07-25

## 2022-07-25 PROBLEM — Z78.9 CURRENT NON-SMOKER: Status: ACTIVE | Noted: 2019-12-10

## 2022-07-25 PROBLEM — Z82.49 FAMILY HISTORY OF CARDIAC DISORDER: Status: ACTIVE | Noted: 2019-12-10

## 2022-07-25 PROBLEM — Z86.79 HISTORY OF RHEUMATIC FEVER: Status: RESOLVED | Noted: 2019-12-10 | Resolved: 2022-07-25

## 2022-07-25 PROBLEM — Z80.3 FAMILY HISTORY OF MALIGNANT NEOPLASM OF BREAST: Status: ACTIVE | Noted: 2019-12-10

## 2022-07-27 ENCOUNTER — APPOINTMENT (OUTPATIENT)
Dept: CARDIOLOGY | Facility: CLINIC | Age: 72
End: 2022-07-27

## 2022-07-27 VITALS — SYSTOLIC BLOOD PRESSURE: 125 MMHG | HEART RATE: 73 BPM | TEMPERATURE: 97.9 F | DIASTOLIC BLOOD PRESSURE: 77 MMHG

## 2022-07-27 VITALS — BODY MASS INDEX: 30.21 KG/M2 | WEIGHT: 204 LBS | HEIGHT: 69 IN

## 2022-07-27 DIAGNOSIS — Z86.79 PERSONAL HISTORY OF OTHER DISEASES OF THE CIRCULATORY SYSTEM: ICD-10-CM

## 2022-07-27 DIAGNOSIS — Z87.19 PERSONAL HISTORY OF OTHER DISEASES OF THE DIGESTIVE SYSTEM: ICD-10-CM

## 2022-07-27 DIAGNOSIS — M47.812 SPONDYLOSIS W/OUT MYELOPATHY OR RADICULOPATHY, CERVICAL REGION: ICD-10-CM

## 2022-07-27 DIAGNOSIS — G43.909 MIGRAINE, UNSPECIFIED, NOT INTRACTABLE, W/OUT STATUS MIGRAINOSUS: ICD-10-CM

## 2022-07-27 DIAGNOSIS — Z80.3 FAMILY HISTORY OF MALIGNANT NEOPLASM OF BREAST: ICD-10-CM

## 2022-07-27 DIAGNOSIS — Z78.9 OTHER SPECIFIED HEALTH STATUS: ICD-10-CM

## 2022-07-27 DIAGNOSIS — Z82.49 FAMILY HISTORY OF ISCHEMIC HEART DISEASE AND OTHER DISEASES OF THE CIRCULATORY SYSTEM: ICD-10-CM

## 2022-07-27 PROCEDURE — 93000 ELECTROCARDIOGRAM COMPLETE: CPT

## 2022-07-27 PROCEDURE — 99203 OFFICE O/P NEW LOW 30 MIN: CPT

## 2022-07-27 RX ORDER — MULTIVIT-MIN/IRON/FOLIC ACID/K 18-600-40
CAPSULE ORAL
Refills: 0 | Status: ACTIVE | COMMUNITY

## 2022-07-27 RX ORDER — APIXABAN 5 MG/1
5 TABLET, FILM COATED ORAL
Qty: 60 | Refills: 2 | Status: DISCONTINUED | COMMUNITY
Start: 2019-12-10 | End: 2022-07-27

## 2022-07-27 RX ORDER — UBIDECARENONE/VIT E ACET 100MG-5
CAPSULE ORAL
Refills: 0 | Status: ACTIVE | COMMUNITY

## 2022-07-27 RX ORDER — CLINDAMYCIN PHOSPHATE 20 MG/G
2 CREAM VAGINAL
Qty: 40 | Refills: 0 | Status: ACTIVE | COMMUNITY
Start: 2022-02-24

## 2022-07-27 RX ORDER — ALPRAZOLAM 0.5 MG/1
0.5 TABLET ORAL
Qty: 30 | Refills: 0 | Status: ACTIVE | COMMUNITY
Start: 2022-06-07

## 2022-07-27 RX ORDER — AZELASTINE HYDROCHLORIDE 137 UG/1
137 SPRAY, METERED NASAL
Qty: 30 | Refills: 0 | Status: ACTIVE | COMMUNITY
Start: 2022-06-20

## 2022-07-27 RX ORDER — HYDROCHLOROTHIAZIDE 25 MG/1
25 TABLET ORAL
Qty: 90 | Refills: 0 | Status: ACTIVE | COMMUNITY
Start: 2021-09-07

## 2022-07-27 RX ORDER — CICLOPIROX OLAMINE 7.7 MG/G
0.77 CREAM TOPICAL
Qty: 30 | Refills: 0 | Status: ACTIVE | COMMUNITY
Start: 2022-04-20

## 2022-07-27 NOTE — HISTORY OF PRESENT ILLNESS
[FreeTextEntry1] : Ms. Staples is a 71yo F with PMHx of asthma, hypothyroid, ETHAN on CPAP, degenerative disease of cervical spine and PE who presents to establish care. Her PMD and former cardiologist is Dr. Claudio Ortiz. [place] developed PE post surgical procedure on her right shoulder at the end of 2019, was on Eliquis for 1 year. \par Had vaginal polypectomy, rectal fistula repair within the last 3 years. \par She complains of palpitations with HR up to 150 bpm, (on diltiazem 180 mg), occasional chest discomfort radiating to the left scapula. Reports "tiredness in her hips" with walking.

## 2022-07-27 NOTE — ASSESSMENT
[FreeTextEntry1] : Chest pain\par - will obtain CTA results from  (patient had this last year)\par - will schedule TTE to assess for structural and functional abnormalities\par \par LE claudication\par - will obtain HESHAM/PVR\par - will schedule arterial duplex of LE\par \par Follow up in 3 months\par \par \par \par \par \par

## 2022-07-27 NOTE — REASON FOR VISIT
[Other: ____] : [unfilled] [FreeTextEntry1] : Diagnostic Tests:\par ---------------------\par EKG:\par 07/27/22-NSR. Minimal ST depressions. \par 11/18/21-NSR. Nonspecific ST changes. \par ---------------------\par US:\par 11/18/21-LE Venous: No DVT, superficial thrombophlebitis.

## 2022-09-12 ENCOUNTER — APPOINTMENT (OUTPATIENT)
Dept: CARDIOLOGY | Facility: CLINIC | Age: 72
End: 2022-09-12

## 2022-09-12 PROCEDURE — 93925 LOWER EXTREMITY STUDY: CPT

## 2022-09-12 PROCEDURE — 93923 UPR/LXTR ART STDY 3+ LVLS: CPT

## 2022-09-12 PROCEDURE — 93306 TTE W/DOPPLER COMPLETE: CPT

## 2022-10-12 ENCOUNTER — APPOINTMENT (OUTPATIENT)
Dept: CARDIOLOGY | Facility: CLINIC | Age: 72
End: 2022-10-12

## 2022-10-12 VITALS — HEIGHT: 69 IN | BODY MASS INDEX: 31.1 KG/M2 | WEIGHT: 210 LBS

## 2022-10-12 VITALS — SYSTOLIC BLOOD PRESSURE: 124 MMHG | DIASTOLIC BLOOD PRESSURE: 72 MMHG | HEART RATE: 66 BPM

## 2022-10-12 PROCEDURE — 93000 ELECTROCARDIOGRAM COMPLETE: CPT

## 2022-10-12 PROCEDURE — 99213 OFFICE O/P EST LOW 20 MIN: CPT

## 2022-10-12 RX ORDER — METRONIDAZOLE 7.5 MG/G
0.75 GEL VAGINAL
Qty: 70 | Refills: 0 | Status: DISCONTINUED | COMMUNITY
Start: 2022-02-18 | End: 2022-10-12

## 2022-10-12 RX ORDER — BUTALB/ACETAMINOPHEN/CAFFEINE 50-325-40
50-325-40 TABLET ORAL
Refills: 0 | Status: ACTIVE | COMMUNITY

## 2022-10-12 RX ORDER — KETOCONAZOLE 20.5 MG/ML
2 SHAMPOO, SUSPENSION TOPICAL
Qty: 120 | Refills: 0 | Status: DISCONTINUED | COMMUNITY
Start: 2022-03-31 | End: 2022-10-12

## 2022-10-12 NOTE — REASON FOR VISIT
[Other: ____] : [unfilled] [FreeTextEntry1] : Diagnostic Tests:\par ---------------------\par EKG:\par 10/12/22-NSR. Nonspecific ST changes. \par 07/27/22-NSR. Minimal ST depressions. \par 11/18/21-NSR. Nonspecific ST changes. \par ---------------------\par Echo:\par 09/12/22-TTE: EF 69%. Mild AI, PI. \par ---------------------\par Physio:\par 09/14/22-HESHAM/PVR: Normal HESHAM B/L. PVR nondiagnostic due to pain. \par ---------------------\par US:\par 09/14/22-LE Arterial: B/L mild diffuse. \par 11/18/21-LE Venous: No DVT, superficial thrombophlebitis. \par ---------------------\par CT:\par 07/19/21-CCTA:  (50-75%ile, LM and LAD). Motion artifact precludes accurate assessment.

## 2022-10-12 NOTE — HISTORY OF PRESENT ILLNESS
[FreeTextEntry1] : Ms. Staples is a 73yo F with PMHx of asthma, hypothyroid, ETHAN on CPAP, degenerative disease of cervical spine and PE who presents to [facility] care. Her PMD and former cardiologist is Dr. Claudio Ortiz. Patient developed PE post surgical procedure on her right shoulder at the end of 2019, was on Eliquis for 1 year. \par Had vaginal polypectomy, rectal fistula repair within the last 3 years. \par She complains of palpitations with HR up to 150 bpm, (on diltiazem 180 mg), occasional chest discomfort radiating to the left scapula. Reports "tiredness in her hips" with walking. \par \par 10/12/2022: John Pauln presents for cardiology F/U visit. CTA from 2021 ( done in the outside facility) showed elevated Ca score, calcification in the distribution of left main, no clear information due to multiple artifacts. Patient complains of occasional chest discomfort - dull ache on and off. Palpitations are controlled with Diltiazem.

## 2022-12-28 ENCOUNTER — APPOINTMENT (OUTPATIENT)
Age: 72
End: 2022-12-28
Payer: MEDICARE

## 2022-12-28 VITALS
SYSTOLIC BLOOD PRESSURE: 120 MMHG | RESPIRATION RATE: 12 BRPM | HEART RATE: 77 BPM | WEIGHT: 206 LBS | HEIGHT: 69 IN | BODY MASS INDEX: 30.51 KG/M2 | OXYGEN SATURATION: 96 % | DIASTOLIC BLOOD PRESSURE: 70 MMHG

## 2022-12-28 PROCEDURE — 99214 OFFICE O/P EST MOD 30 MIN: CPT

## 2022-12-28 NOTE — HISTORY OF PRESENT ILLNESS
[Intermittent] : intermittent [Doing Well] : doing well [Well Controlled] : Well controlled [Checks Regularly] : The patient checks ~his/her~ peak flow regularly [Good Control] : peak flow has been good [Adherent] : the patient is adherent with ~his/her~ medication regimen [Goals--Doing Well] : the patient is doing well with ~his/her~ asthma goals [Follow-Up - Routine Clinic] : a routine clinic follow-up of [None] : No associated symptoms are reported [Good Compliance] : good compliance with treatment [Good Tolerance] : good tolerance of treatment [Good Symptom Control] : good symptom control [de-identified] : APAP

## 2022-12-28 NOTE — ASSESSMENT
[FreeTextEntry1] : Severe ETHAN on APAP \par Intermittent asthma well controlled \par HO VTE after shoulder surgery, now off AC \par Considering hysterectomy in [facility]

## 2022-12-30 LAB
ALBUMIN SERPL ELPH-MCNC: 4.2 G/DL
ALP BLD-CCNC: 107 U/L
ALT SERPL-CCNC: 25 U/L
ANION GAP SERPL CALC-SCNC: 9 MMOL/L
AST SERPL-CCNC: 22 U/L
BASOPHILS # BLD AUTO: 0.02 K/UL
BASOPHILS NFR BLD AUTO: 0.3 %
BILIRUB SERPL-MCNC: <0.2 MG/DL
BUN SERPL-MCNC: 23 MG/DL
CALCIUM SERPL-MCNC: 9.6 MG/DL
CHLORIDE SERPL-SCNC: 99 MMOL/L
CHOLEST SERPL-MCNC: 185 MG/DL
CO2 SERPL-SCNC: 31 MMOL/L
CREAT SERPL-MCNC: 0.8 MG/DL
EGFR: 78 ML/MIN/1.73M2
EOSINOPHIL # BLD AUTO: 0.14 K/UL
EOSINOPHIL NFR BLD AUTO: 2.1 %
ESTIMATED AVERAGE GLUCOSE: 111 MG/DL
GLUCOSE SERPL-MCNC: 74 MG/DL
HBA1C MFR BLD HPLC: 5.5 %
HCT VFR BLD CALC: 39.2 %
HDLC SERPL-MCNC: 60 MG/DL
HGB BLD-MCNC: 12.6 G/DL
IMM GRANULOCYTES NFR BLD AUTO: 0.5 %
LDLC SERPL CALC-MCNC: 93 MG/DL
LYMPHOCYTES # BLD AUTO: 1.99 K/UL
LYMPHOCYTES NFR BLD AUTO: 30.5 %
MAN DIFF?: NORMAL
MCHC RBC-ENTMCNC: 31 PG
MCHC RBC-ENTMCNC: 32.1 G/DL
MCV RBC AUTO: 96.3 FL
MONOCYTES # BLD AUTO: 0.8 K/UL
MONOCYTES NFR BLD AUTO: 12.3 %
NEUTROPHILS # BLD AUTO: 3.54 K/UL
NEUTROPHILS NFR BLD AUTO: 54.3 %
NONHDLC SERPL-MCNC: 125 MG/DL
PLATELET # BLD AUTO: 234 K/UL
POTASSIUM SERPL-SCNC: 4.1 MMOL/L
PROT SERPL-MCNC: 6.8 G/DL
RBC # BLD: 4.07 M/UL
RBC # FLD: 14.6 %
SODIUM SERPL-SCNC: 139 MMOL/L
TRIGL SERPL-MCNC: 160 MG/DL
TSH SERPL-ACNC: 1.16 UIU/ML
WBC # FLD AUTO: 6.52 K/UL

## 2023-01-03 ENCOUNTER — OUTPATIENT (OUTPATIENT)
Dept: OUTPATIENT SERVICES | Facility: HOSPITAL | Age: 73
LOS: 1 days | Discharge: HOME | End: 2023-01-03

## 2023-01-03 ENCOUNTER — TRANSCRIPTION ENCOUNTER (OUTPATIENT)
Age: 73
End: 2023-01-03

## 2023-01-03 DIAGNOSIS — Z98.891 HISTORY OF UTERINE SCAR FROM PREVIOUS SURGERY: Chronic | ICD-10-CM

## 2023-01-03 DIAGNOSIS — Z98.42 CATARACT EXTRACTION STATUS, LEFT EYE: Chronic | ICD-10-CM

## 2023-01-03 DIAGNOSIS — Z98.890 OTHER SPECIFIED POSTPROCEDURAL STATES: Chronic | ICD-10-CM

## 2023-01-03 DIAGNOSIS — R07.9 CHEST PAIN, UNSPECIFIED: ICD-10-CM

## 2023-01-09 PROBLEM — Z86.711 HISTORY OF PULMONARY EMBOLISM: Status: RESOLVED | Noted: 2019-12-10 | Resolved: 2023-01-09

## 2023-01-11 ENCOUNTER — APPOINTMENT (OUTPATIENT)
Dept: CARDIOLOGY | Facility: CLINIC | Age: 73
End: 2023-01-11
Payer: MEDICARE

## 2023-01-11 VITALS
BODY MASS INDEX: 30.51 KG/M2 | SYSTOLIC BLOOD PRESSURE: 140 MMHG | HEART RATE: 66 BPM | WEIGHT: 206 LBS | HEIGHT: 69 IN | DIASTOLIC BLOOD PRESSURE: 82 MMHG

## 2023-01-11 VITALS — DIASTOLIC BLOOD PRESSURE: 60 MMHG | SYSTOLIC BLOOD PRESSURE: 112 MMHG

## 2023-01-11 DIAGNOSIS — Z86.711 PERSONAL HISTORY OF PULMONARY EMBOLISM: ICD-10-CM

## 2023-01-11 PROCEDURE — 93000 ELECTROCARDIOGRAM COMPLETE: CPT

## 2023-01-11 PROCEDURE — 99213 OFFICE O/P EST LOW 20 MIN: CPT

## 2023-01-11 RX ORDER — ESOMEPRAZOLE MAGNESIUM 20 MG/1
20 CAPSULE, DELAYED RELEASE ORAL
Refills: 0 | Status: ACTIVE | COMMUNITY

## 2023-01-11 RX ORDER — NYSTATIN 100000 1/G
100000 POWDER TOPICAL
Qty: 60 | Refills: 0 | Status: DISCONTINUED | COMMUNITY
Start: 2022-04-20 | End: 2023-01-11

## 2023-01-11 RX ORDER — FLUCONAZOLE 150 MG/1
150 TABLET ORAL
Qty: 1 | Refills: 0 | Status: DISCONTINUED | COMMUNITY
Start: 2022-03-31 | End: 2023-01-11

## 2023-01-11 RX ORDER — CHLORHEXIDINE GLUCONATE, 0.12% ORAL RINSE 1.2 MG/ML
0.12 SOLUTION DENTAL
Qty: 946 | Refills: 0 | Status: DISCONTINUED | COMMUNITY
Start: 2022-05-03 | End: 2023-01-11

## 2023-01-11 NOTE — REASON FOR VISIT
[Other: ____] : [unfilled] [FreeTextEntry1] : Diagnostic Tests:\par ---------------------\par EKG:\par 01/11/23-NSR. NSST changes. \par 10/12/22-NSR. Nonspecific ST changes. \par 07/27/22-NSR. Minimal ST depressions. \par 11/18/21-NSR. Nonspecific ST changes. \par ---------------------\par Echo:\par 09/12/22-TTE: EF 69%. Mild AI, PI. \par ---------------------\par Physio:\par 09/14/22-HESHAM/PVR: Normal HESHAM B/L. PVR nondiagnostic due to pain. \par ---------------------\par US:\par 09/14/22-LE Arterial: B/L mild diffuse. \par 11/18/21-LE Venous: No DVT, superficial thrombophlebitis. \par ---------------------\par CT:\par 07/19/21-CCTA:  (50-75%ile, LM and LAD). Motion artifact precludes accurate assessment.

## 2023-01-11 NOTE — ASSESSMENT
[FreeTextEntry1] : Palpitations\par - will continue Diltiazem  mg PO daily\par \par Chest pain\par - CTA  from 2021 showed elevated Ca score and calcification in the distribution of LM\par - TTE showed normal LV function\par - patient complains of occasional chest pain\par - Will try to obtain CCTA again.\par - Will instruct to do without NTG. \par \par LE claudication\par - no significant PAD on arterial Doppler\par - DVT ruled out by venous Doppler. \par \par Follow up in 3-4 months\par \par \par \par \par \par

## 2023-01-11 NOTE — HISTORY OF PRESENT ILLNESS
[FreeTextEntry1] : Ms. Staples is a 71yo F with PMHx of asthma, hypothyroid, ETHAN on CPAP, degenerative disease of cervical spine and PE who presents to [facility] care. Her PMD and former cardiologist is Dr. Claudio Ortiz. Patient developed PE post surgical procedure on her right shoulder at the end of 2019, was on Eliquis for 1 year. \par Had vaginal polypectomy, rectal fistula repair within the last 3 years. \par She complains of palpitations with HR up to 150 bpm, (on diltiazem 180 mg), occasional chest discomfort radiating to the left scapula. Reports "tiredness in her hips" with walking. \par \par 10/12/2022: Patientpresents for cardiology F/U visit. CTA from 2021 ( done in the outside facility) showed elevated Ca score, calcification in the distribution of left main, no clear information due to multiple artifacts. Patient complains of occasional chest discomfort - dull ache on and off. Palpitations are controlled with Diltiazem. \par -Patient was sent for CCTA but did not complete test due to refusing NTG. She is willing to try again without NTG.

## 2023-01-25 NOTE — ED ADULT NURSE NOTE - NSFALLRSKINDICATORS_ED_ALL_ED
SUBJECTIVE:    Patient is a 67y old Female who presents with a chief complaint of CARDIAC ARREST ELEVATED TROPONIN     (23 Jan 2023 19:42)    Currently admitted to medicine with the primary diagnosis of Cardiac arrest      Today is hospital day 5d. This morning she is resting  in bed.   No overnight events.     PAST MEDICAL & SURGICAL HISTORY  No pertinent past medical history    No significant past surgical history      ALLERGIES:  No Known Allergies    MEDICATIONS:  STANDING MEDICATIONS  aspirin  chewable 81 milliGRAM(s) Oral daily  atorvastatin 40 milliGRAM(s) Oral at bedtime  busPIRone 30 milliGRAM(s) Oral every 8 hours  chlorhexidine 0.12% Liquid 15 milliLiter(s) Oral Mucosa every 12 hours  chlorhexidine 2% Cloths 1 Application(s) Topical <User Schedule>  chlorhexidine 4% Liquid 1 Application(s) Topical <User Schedule>  clopidogrel Tablet 75 milliGRAM(s) Oral daily  clopidogrel Tablet 300 milliGRAM(s) Oral once  fentaNYL   Infusion. 0.5 MICROgram(s)/kG/Hr IV Continuous <Continuous>  heparin   Injectable 5000 Unit(s) SubCutaneous every 12 hours  midazolam Infusion 0.02 mG/kG/Hr IV Continuous <Continuous>  norepinephrine Infusion 0.05 MICROgram(s)/kG/Min IV Continuous <Continuous>  piperacillin/tazobactam IVPB.. 3.375 Gram(s) IV Intermittent every 6 hours  polyethylene glycol 3350 17 Gram(s) Oral daily  propofol Infusion 5 MICROgram(s)/kG/Min IV Continuous <Continuous>  senna 2 Tablet(s) Oral at bedtime    PRN MEDICATIONS  acetaminophen     Tablet .. 650 milliGRAM(s) Oral every 6 hours PRN  LORazepam   Injectable 1 milliGRAM(s) IV Push every 12 hours PRN  sodium chloride 0.9% lock flush 10 milliLiter(s) IV Push every 1 hour PRN    VITALS:   T(F): 101.7  HR: 44  BP: 113/50  RR: 22  SpO2: 100%    LABS:                        9.2    21.02 )-----------( 263      ( 25 Jan 2023 05:39 )             29.4     01-25    146  |  111<H>  |  47<H>  ----------------------------<  128<H>  4.9   |  22  |  1.3    Ca    8.3<L>      25 Jan 2023 05:39  Mg     2.6     01-25    TPro  4.8<L>  /  Alb  2.6<L>  /  TBili  0.6  /  DBili  x   /  AST  75<H>  /  ALT  23  /  AlkPhos  88  01-25        ABG - ( 25 Jan 2023 04:21 )  pH, Arterial: 7.43  pH, Blood: x     /  pCO2: 37    /  pO2: 139   / HCO3: 25    / Base Excess: 0.6   /  SaO2: 99.0                  Culture - Urine (collected 22 Jan 2023 14:25)  Source: Catheterized Catheterized  Final Report (24 Jan 2023 05:34):    No growth        PHYSICAL EXAM:  GEN:  Ill appearing. NAD,   LUNGS: Clear to auscultation bilaterally ,intubated, off sedation  HEART: Normal rate, ; Regular rhythm; No edema  ABD: Soft, non-tender, non-distended.  EXT: NC/NC/NE/2+PP/CARLIN/Skin Intact.   NEURO: +gag    Intravenous access:   NG tube:   Ray Catheter:   Indwelling Urethral Catheter:     Connect To:  Straight Drainage/Slater    Indication:  Urinary Retention / Obstruction (01-24-23 @ 12:56) (not performed) [Active]  Indwelling Urethral Catheter:     Connect To:  Straight Drainage/Gravity    Indication:  Urine Output Monitoring in Critically Ill (01-20-23 @ 08:19) (not performed) [Active]       no

## 2023-03-08 ENCOUNTER — OUTPATIENT (OUTPATIENT)
Dept: OUTPATIENT SERVICES | Facility: HOSPITAL | Age: 73
LOS: 1 days | End: 2023-03-08
Payer: MEDICARE

## 2023-03-08 DIAGNOSIS — Z98.890 OTHER SPECIFIED POSTPROCEDURAL STATES: Chronic | ICD-10-CM

## 2023-03-08 DIAGNOSIS — Z98.891 HISTORY OF UTERINE SCAR FROM PREVIOUS SURGERY: Chronic | ICD-10-CM

## 2023-03-08 DIAGNOSIS — Z98.42 CATARACT EXTRACTION STATUS, LEFT EYE: Chronic | ICD-10-CM

## 2023-03-08 DIAGNOSIS — Z12.31 ENCOUNTER FOR SCREENING MAMMOGRAM FOR MALIGNANT NEOPLASM OF BREAST: ICD-10-CM

## 2023-03-08 DIAGNOSIS — Z00.8 ENCOUNTER FOR OTHER GENERAL EXAMINATION: ICD-10-CM

## 2023-03-08 PROCEDURE — 77063 BREAST TOMOSYNTHESIS BI: CPT | Mod: 26

## 2023-03-08 PROCEDURE — 77063 BREAST TOMOSYNTHESIS BI: CPT

## 2023-03-08 PROCEDURE — 77067 SCR MAMMO BI INCL CAD: CPT

## 2023-03-08 PROCEDURE — 77067 SCR MAMMO BI INCL CAD: CPT | Mod: 26

## 2023-03-22 ENCOUNTER — EMERGENCY (EMERGENCY)
Facility: HOSPITAL | Age: 73
LOS: 0 days | Discharge: HOME | End: 2023-03-22
Attending: STUDENT IN AN ORGANIZED HEALTH CARE EDUCATION/TRAINING PROGRAM
Payer: MEDICARE

## 2023-03-22 VITALS
DIASTOLIC BLOOD PRESSURE: 70 MMHG | RESPIRATION RATE: 18 BRPM | SYSTOLIC BLOOD PRESSURE: 151 MMHG | WEIGHT: 205.91 LBS | HEART RATE: 75 BPM | TEMPERATURE: 98 F | OXYGEN SATURATION: 100 %

## 2023-03-22 VITALS
HEART RATE: 85 BPM | DIASTOLIC BLOOD PRESSURE: 63 MMHG | OXYGEN SATURATION: 99 % | RESPIRATION RATE: 18 BRPM | SYSTOLIC BLOOD PRESSURE: 119 MMHG

## 2023-03-22 DIAGNOSIS — Z88.5 ALLERGY STATUS TO NARCOTIC AGENT: ICD-10-CM

## 2023-03-22 DIAGNOSIS — Z99.89 DEPENDENCE ON OTHER ENABLING MACHINES AND DEVICES: ICD-10-CM

## 2023-03-22 DIAGNOSIS — I10 ESSENTIAL (PRIMARY) HYPERTENSION: ICD-10-CM

## 2023-03-22 DIAGNOSIS — Z86.79 PERSONAL HISTORY OF OTHER DISEASES OF THE CIRCULATORY SYSTEM: ICD-10-CM

## 2023-03-22 DIAGNOSIS — Z20.822 CONTACT WITH AND (SUSPECTED) EXPOSURE TO COVID-19: ICD-10-CM

## 2023-03-22 DIAGNOSIS — Z88.8 ALLERGY STATUS TO OTHER DRUGS, MEDICAMENTS AND BIOLOGICAL SUBSTANCES: ICD-10-CM

## 2023-03-22 DIAGNOSIS — Z87.39 PERSONAL HISTORY OF OTHER DISEASES OF THE MUSCULOSKELETAL SYSTEM AND CONNECTIVE TISSUE: ICD-10-CM

## 2023-03-22 DIAGNOSIS — R07.89 OTHER CHEST PAIN: ICD-10-CM

## 2023-03-22 DIAGNOSIS — Z88.0 ALLERGY STATUS TO PENICILLIN: ICD-10-CM

## 2023-03-22 DIAGNOSIS — K21.9 GASTRO-ESOPHAGEAL REFLUX DISEASE WITHOUT ESOPHAGITIS: ICD-10-CM

## 2023-03-22 DIAGNOSIS — G43.909 MIGRAINE, UNSPECIFIED, NOT INTRACTABLE, WITHOUT STATUS MIGRAINOSUS: ICD-10-CM

## 2023-03-22 DIAGNOSIS — Z91.040 LATEX ALLERGY STATUS: ICD-10-CM

## 2023-03-22 DIAGNOSIS — G47.33 OBSTRUCTIVE SLEEP APNEA (ADULT) (PEDIATRIC): ICD-10-CM

## 2023-03-22 DIAGNOSIS — Z98.891 HISTORY OF UTERINE SCAR FROM PREVIOUS SURGERY: Chronic | ICD-10-CM

## 2023-03-22 DIAGNOSIS — Z98.42 CATARACT EXTRACTION STATUS, LEFT EYE: Chronic | ICD-10-CM

## 2023-03-22 DIAGNOSIS — E03.9 HYPOTHYROIDISM, UNSPECIFIED: ICD-10-CM

## 2023-03-22 DIAGNOSIS — Z98.890 OTHER SPECIFIED POSTPROCEDURAL STATES: Chronic | ICD-10-CM

## 2023-03-22 LAB
ALBUMIN SERPL ELPH-MCNC: 4 G/DL — SIGNIFICANT CHANGE UP (ref 3.5–5.2)
ALP SERPL-CCNC: 100 U/L — SIGNIFICANT CHANGE UP (ref 30–115)
ALT FLD-CCNC: 25 U/L — SIGNIFICANT CHANGE UP (ref 0–41)
ANION GAP SERPL CALC-SCNC: 10 MMOL/L — SIGNIFICANT CHANGE UP (ref 7–14)
APTT BLD: 33.8 SEC — SIGNIFICANT CHANGE UP (ref 27–39.2)
AST SERPL-CCNC: 59 U/L — HIGH (ref 0–41)
BASOPHILS # BLD AUTO: 0.04 K/UL — SIGNIFICANT CHANGE UP (ref 0–0.2)
BASOPHILS NFR BLD AUTO: 0.4 % — SIGNIFICANT CHANGE UP (ref 0–1)
BILIRUB SERPL-MCNC: <0.2 MG/DL — SIGNIFICANT CHANGE UP (ref 0.2–1.2)
BUN SERPL-MCNC: 21 MG/DL — HIGH (ref 10–20)
CALCIUM SERPL-MCNC: 9.2 MG/DL — SIGNIFICANT CHANGE UP (ref 8.4–10.5)
CHLORIDE SERPL-SCNC: 94 MMOL/L — LOW (ref 98–110)
CO2 SERPL-SCNC: 27 MMOL/L — SIGNIFICANT CHANGE UP (ref 17–32)
CREAT SERPL-MCNC: 0.8 MG/DL — SIGNIFICANT CHANGE UP (ref 0.7–1.5)
EGFR: 78 ML/MIN/1.73M2 — SIGNIFICANT CHANGE UP
EOSINOPHIL # BLD AUTO: 0.21 K/UL — SIGNIFICANT CHANGE UP (ref 0–0.7)
EOSINOPHIL NFR BLD AUTO: 2.3 % — SIGNIFICANT CHANGE UP (ref 0–8)
GLUCOSE SERPL-MCNC: 112 MG/DL — HIGH (ref 70–99)
HCT VFR BLD CALC: 38.3 % — SIGNIFICANT CHANGE UP (ref 37–47)
HGB BLD-MCNC: 13.1 G/DL — SIGNIFICANT CHANGE UP (ref 12–16)
IMM GRANULOCYTES NFR BLD AUTO: 0.3 % — SIGNIFICANT CHANGE UP (ref 0.1–0.3)
INR BLD: 1.03 RATIO — SIGNIFICANT CHANGE UP (ref 0.65–1.3)
LYMPHOCYTES # BLD AUTO: 2.68 K/UL — SIGNIFICANT CHANGE UP (ref 1.2–3.4)
LYMPHOCYTES # BLD AUTO: 29.4 % — SIGNIFICANT CHANGE UP (ref 20.5–51.1)
MCHC RBC-ENTMCNC: 30.6 PG — SIGNIFICANT CHANGE UP (ref 27–31)
MCHC RBC-ENTMCNC: 34.2 G/DL — SIGNIFICANT CHANGE UP (ref 32–37)
MCV RBC AUTO: 89.5 FL — SIGNIFICANT CHANGE UP (ref 81–99)
MONOCYTES # BLD AUTO: 0.99 K/UL — HIGH (ref 0.1–0.6)
MONOCYTES NFR BLD AUTO: 10.8 % — HIGH (ref 1.7–9.3)
NEUTROPHILS # BLD AUTO: 5.18 K/UL — SIGNIFICANT CHANGE UP (ref 1.4–6.5)
NEUTROPHILS NFR BLD AUTO: 56.8 % — SIGNIFICANT CHANGE UP (ref 42.2–75.2)
NRBC # BLD: 0 /100 WBCS — SIGNIFICANT CHANGE UP (ref 0–0)
PLATELET # BLD AUTO: 225 K/UL — SIGNIFICANT CHANGE UP (ref 130–400)
POTASSIUM SERPL-MCNC: 4.9 MMOL/L — SIGNIFICANT CHANGE UP (ref 3.5–5)
POTASSIUM SERPL-SCNC: 4.9 MMOL/L — SIGNIFICANT CHANGE UP (ref 3.5–5)
PROT SERPL-MCNC: 7.2 G/DL — SIGNIFICANT CHANGE UP (ref 6–8)
PROTHROM AB SERPL-ACNC: 11.8 SEC — SIGNIFICANT CHANGE UP (ref 9.95–12.87)
RBC # BLD: 4.28 M/UL — SIGNIFICANT CHANGE UP (ref 4.2–5.4)
RBC # FLD: 13.4 % — SIGNIFICANT CHANGE UP (ref 11.5–14.5)
SARS-COV-2 RNA SPEC QL NAA+PROBE: SIGNIFICANT CHANGE UP
SODIUM SERPL-SCNC: 131 MMOL/L — LOW (ref 135–146)
TROPONIN T SERPL-MCNC: <0.01 NG/ML — SIGNIFICANT CHANGE UP
TROPONIN T SERPL-MCNC: <0.01 NG/ML — SIGNIFICANT CHANGE UP
WBC # BLD: 9.13 K/UL — SIGNIFICANT CHANGE UP (ref 4.8–10.8)
WBC # FLD AUTO: 9.13 K/UL — SIGNIFICANT CHANGE UP (ref 4.8–10.8)

## 2023-03-22 PROCEDURE — 85610 PROTHROMBIN TIME: CPT

## 2023-03-22 PROCEDURE — 36415 COLL VENOUS BLD VENIPUNCTURE: CPT

## 2023-03-22 PROCEDURE — 85025 COMPLETE CBC W/AUTO DIFF WBC: CPT

## 2023-03-22 PROCEDURE — 85730 THROMBOPLASTIN TIME PARTIAL: CPT

## 2023-03-22 PROCEDURE — 99285 EMERGENCY DEPT VISIT HI MDM: CPT | Mod: 25

## 2023-03-22 PROCEDURE — 84484 ASSAY OF TROPONIN QUANT: CPT | Mod: 59

## 2023-03-22 PROCEDURE — 93010 ELECTROCARDIOGRAM REPORT: CPT

## 2023-03-22 PROCEDURE — 99285 EMERGENCY DEPT VISIT HI MDM: CPT | Mod: FS,CS

## 2023-03-22 PROCEDURE — 71045 X-RAY EXAM CHEST 1 VIEW: CPT

## 2023-03-22 PROCEDURE — 71045 X-RAY EXAM CHEST 1 VIEW: CPT | Mod: 26

## 2023-03-22 PROCEDURE — 80053 COMPREHEN METABOLIC PANEL: CPT

## 2023-03-22 PROCEDURE — 93005 ELECTROCARDIOGRAM TRACING: CPT

## 2023-03-22 PROCEDURE — 87635 SARS-COV-2 COVID-19 AMP PRB: CPT

## 2023-03-22 RX ORDER — ACETAMINOPHEN 500 MG
650 TABLET ORAL ONCE
Refills: 0 | Status: COMPLETED | OUTPATIENT
Start: 2023-03-22 | End: 2023-03-22

## 2023-03-22 RX ADMIN — Medication 650 MILLIGRAM(S): at 20:51

## 2023-03-22 NOTE — ED PROVIDER NOTE - OBJECTIVE STATEMENT
72-year-old female presents to the ED for evaluation of left-sided chest pain.  Patient states she has been having intermittent tightness to the left side of her jaw and some pain down her left arm as well.  Patient does admit that this is exacerbated by stress at times.  Patient denies any shortness of breath

## 2023-03-22 NOTE — ED PROVIDER NOTE - NSFOLLOWUPINSTRUCTIONS_ED_ALL_ED_FT

## 2023-03-22 NOTE — ED ADULT NURSE REASSESSMENT NOTE - NS ED NURSE REASSESS COMMENT FT1
Report received from Christine NEW. Pt received aaox3 in NAD connected to cardiac monitor with NSR noted.

## 2023-03-22 NOTE — ED PROVIDER NOTE - PATIENT PORTAL LINK FT
You can access the FollowMyHealth Patient Portal offered by [facility] by registering at the following website: [email or web address]. By joining [employer]’s FollowMyHealth portal, you will also be able to view your health information using other applications (apps) compatible with our system.

## 2023-03-22 NOTE — ED ADULT TRIAGE NOTE - NS ED TRIAGE AVPU SCALE
Stop Avodart and Rapaflo- if you feel the urinary symptoms are recurring, restart Rapaflo until 1 week prior to next visit then stop  Make sure you are drinking at least 40 oz of water per day  Call the office for concerns or questions
Alert-The patient is alert, awake and responds to voice. The patient is oriented to time, place, and person. The triage nurse is able to obtain subjective information.

## 2023-03-22 NOTE — ED PROVIDER NOTE - CARE PROVIDER_API CALL
Enrico Goel)  Cardiovascular Disease; Internal Medicine  [address] [place] [address]  Phone: [phone]  Fax: [phone]  Follow Up Time:

## 2023-03-22 NOTE — ED PROVIDER NOTE - CLINICAL SUMMARY MEDICAL DECISION MAKING FREE TEXT BOX
72F p/w left sided chest pain, assoc with pain to her jaw and LUE. Non-exertional, no sob, keys, LE pain or edema, tobacco use. vs noted, triage note reviewed and exam as above. ekg non-ischemic, trop neg x2, and cxr wnl. Consideration of admission for cardiology eval, however trop neg x2 sx very atypical and improved with analgesia. Recommend close cardiology f/u. I have fully discussed the medical management and delivery of care with the patient. I have discussed any available labs, imaging and treatment options with the patient. All Questions answered at the bedside and printed copies of all results provided and recommended to review with PCP. Patient confirms understanding and has been given detailed return precautions. Patient instructed to return to the ED should symptoms persist or worsen. Patient has demonstrated capacity and has verbalized understanding. Patient is well appearing upon discharge, ambulatory with a steady gait.

## 2023-03-22 NOTE — ED PROVIDER NOTE - HIV OFFER
Pt started off the day very irritable. Pt got upset over simple things. Pt was able to calm down. Code 21 called, but was able to calm down in room and took a daily prescribed seroquel. Pt went off of the floor to get a shower with 24 Quan today. Dad called, he was updated and spoke with pt.    Previously Declined (within the last year)

## 2023-04-17 ENCOUNTER — APPOINTMENT (OUTPATIENT)
Dept: CARDIOLOGY | Facility: CLINIC | Age: 73
End: 2023-04-17
Payer: MEDICARE

## 2023-04-18 ENCOUNTER — APPOINTMENT (OUTPATIENT)
Dept: CARDIOLOGY | Facility: CLINIC | Age: 73
End: 2023-04-18
Payer: MEDICARE

## 2023-04-18 VITALS
WEIGHT: 210 LBS | BODY MASS INDEX: 31.1 KG/M2 | SYSTOLIC BLOOD PRESSURE: 118 MMHG | DIASTOLIC BLOOD PRESSURE: 80 MMHG | HEIGHT: 69 IN

## 2023-04-18 PROCEDURE — 99213 OFFICE O/P EST LOW 20 MIN: CPT

## 2023-04-18 RX ORDER — DILTIAZEM HYDROCHLORIDE 30 MG/1
30 TABLET ORAL
Qty: 1 | Refills: 0 | Status: DISCONTINUED | COMMUNITY
Start: 2022-10-28 | End: 2023-04-18

## 2023-04-18 NOTE — ASSESSMENT
[FreeTextEntry1] : Palpitations\par - will continue Diltiazem  mg PO daily\par \par Chest pain\par - CTA  from 2021 showed elevated Ca score and calcification in the distribution of LM\par - TTE showed normal LV function\par - patient complains of occasional chest pain\par - Will try to obtain CCTA again.\par - Will instruct to do without NTG. \par \par LE claudication\par - no significant PAD on arterial Doppler\par - DVT ruled out by venous Doppler. \par \par Neck pain: \par -Check US Carotid. \par \par Follow up in 3-4 months\par \par \par \par \par \par

## 2023-04-18 NOTE — REASON FOR VISIT
[FreeTextEntry1] : Diagnostic Tests:\par ---------------------\par EKG:\par 01/11/23-NSR. NSST changes. \par 10/12/22-NSR. Nonspecific ST changes. \par 07/27/22-NSR. Minimal ST depressions. \par 11/18/21-NSR. Nonspecific ST changes. \par ---------------------\par Echo:\par 09/12/22-TTE: EF 69%. Mild AI, PI. \par ---------------------\par Physio:\par 09/14/22-HESHAM/PVR: Normal HESHAM B/L. PVR nondiagnostic due to pain. \par ---------------------\par US:\par 09/14/22-LE Arterial: B/L mild diffuse. \par 11/18/21-LE Venous: No DVT, superficial thrombophlebitis. \par ---------------------\par CT:\par 07/19/21-CCTA:  (50-75%ile, LM and LAD). Motion artifact precludes accurate assessment.

## 2023-04-18 NOTE — HISTORY OF PRESENT ILLNESS
[FreeTextEntry1] : Ms. Staples is a 71yo F with PMHx of asthma, hypothyroid, ETHAN on CPAP, degenerative disease of cervical spine and PE who presents to [facility] care. Her PMD and former cardiologist is Dr. Claudio Ortiz. Patient developed PE post surgical procedure on her right shoulder at the end of 2019, was on Eliquis for 1 year. \par Had vaginal polypectomy, rectal fistula repair within the last 3 years. \par She complains of palpitations with HR up to 150 bpm, (on diltiazem 180 mg), occasional chest discomfort radiating to the left scapula. Reports "tiredness in her hips" with walking. \par \par 10/12/2022: Patient presents for cardiology F/U visit. CTA from 2021 ( done in the outside facility) showed elevated Ca score, calcification in the distribution of left main, no clear information due to multiple artifacts. Patient complains of occasional chest discomfort - dull ache on and off. Palpitations are controlled with Diltiazem. \par -Patient was sent for CCTA but did not complete test due to refusing NTG. She is willing to try again without NTG. \par \par 04/18/23-Patient still with left shoulder pain. She will at times get pulling sensation under her breast. Also complains of some anterior neck pain. Willing to go for CCTA. She will get some trace edema in her ankles which subside with rest.

## 2023-04-26 ENCOUNTER — APPOINTMENT (OUTPATIENT)
Dept: CARDIOLOGY | Facility: CLINIC | Age: 73
End: 2023-04-26

## 2023-05-15 ENCOUNTER — APPOINTMENT (OUTPATIENT)
Dept: CARDIOLOGY | Facility: CLINIC | Age: 73
End: 2023-05-15
Payer: MEDICARE

## 2023-05-15 PROCEDURE — 93880 EXTRACRANIAL BILAT STUDY: CPT

## 2023-05-17 NOTE — ASU PATIENT PROFILE, ADULT - DOES PATIENT HAVE ADVANCE DIRECTIVE
Peripheral IV    Performed by: Ramses Melendez  Authorized by: John Engel MD    Size:  18 G  Laterality:  Right  Location:  Hand  Local Anesthetic:  None  Site Prep:  Alcohol  Technique:  Anatomical landmarks  Attempts:  1  Securement: Tape and Transparent dressing       Yes

## 2023-07-11 ENCOUNTER — APPOINTMENT (OUTPATIENT)
Dept: ORTHOPEDIC SURGERY | Facility: CLINIC | Age: 73
End: 2023-07-11

## 2023-07-12 ENCOUNTER — APPOINTMENT (OUTPATIENT)
Dept: CARDIOLOGY | Facility: CLINIC | Age: 73
End: 2023-07-12

## 2023-07-12 ENCOUNTER — EMERGENCY (EMERGENCY)
Facility: HOSPITAL | Age: 73
LOS: 0 days | Discharge: HOME | End: 2023-07-12
Attending: EMERGENCY MEDICINE
Payer: MEDICARE

## 2023-07-12 VITALS
RESPIRATION RATE: 18 BRPM | HEART RATE: 67 BPM | OXYGEN SATURATION: 100 % | DIASTOLIC BLOOD PRESSURE: 62 MMHG | SYSTOLIC BLOOD PRESSURE: 122 MMHG

## 2023-07-12 VITALS
OXYGEN SATURATION: 100 % | SYSTOLIC BLOOD PRESSURE: 146 MMHG | DIASTOLIC BLOOD PRESSURE: 72 MMHG | HEART RATE: 88 BPM | TEMPERATURE: 98 F | RESPIRATION RATE: 18 BRPM

## 2023-07-12 DIAGNOSIS — Z88.6 ALLERGY STATUS TO ANALGESIC AGENT: ICD-10-CM

## 2023-07-12 DIAGNOSIS — I49.3 VENTRICULAR PREMATURE DEPOLARIZATION: ICD-10-CM

## 2023-07-12 DIAGNOSIS — Z99.89 DEPENDENCE ON OTHER ENABLING MACHINES AND DEVICES: ICD-10-CM

## 2023-07-12 DIAGNOSIS — K21.9 GASTRO-ESOPHAGEAL REFLUX DISEASE WITHOUT ESOPHAGITIS: ICD-10-CM

## 2023-07-12 DIAGNOSIS — G47.33 OBSTRUCTIVE SLEEP APNEA (ADULT) (PEDIATRIC): ICD-10-CM

## 2023-07-12 DIAGNOSIS — Z98.891 HISTORY OF UTERINE SCAR FROM PREVIOUS SURGERY: Chronic | ICD-10-CM

## 2023-07-12 DIAGNOSIS — Z88.8 ALLERGY STATUS TO OTHER DRUGS, MEDICAMENTS AND BIOLOGICAL SUBSTANCES: ICD-10-CM

## 2023-07-12 DIAGNOSIS — Z98.42 CATARACT EXTRACTION STATUS, LEFT EYE: Chronic | ICD-10-CM

## 2023-07-12 DIAGNOSIS — I10 ESSENTIAL (PRIMARY) HYPERTENSION: ICD-10-CM

## 2023-07-12 DIAGNOSIS — Z88.0 ALLERGY STATUS TO PENICILLIN: ICD-10-CM

## 2023-07-12 DIAGNOSIS — G43.909 MIGRAINE, UNSPECIFIED, NOT INTRACTABLE, WITHOUT STATUS MIGRAINOSUS: ICD-10-CM

## 2023-07-12 DIAGNOSIS — Z88.4 ALLERGY STATUS TO ANESTHETIC AGENT: ICD-10-CM

## 2023-07-12 DIAGNOSIS — Z98.890 OTHER SPECIFIED POSTPROCEDURAL STATES: Chronic | ICD-10-CM

## 2023-07-12 DIAGNOSIS — Z91.040 LATEX ALLERGY STATUS: ICD-10-CM

## 2023-07-12 DIAGNOSIS — E03.9 HYPOTHYROIDISM, UNSPECIFIED: ICD-10-CM

## 2023-07-12 DIAGNOSIS — R51.9 HEADACHE, UNSPECIFIED: ICD-10-CM

## 2023-07-12 DIAGNOSIS — Z98.42 CATARACT EXTRACTION STATUS, LEFT EYE: ICD-10-CM

## 2023-07-12 PROCEDURE — 93010 ELECTROCARDIOGRAM REPORT: CPT

## 2023-07-12 PROCEDURE — 99284 EMERGENCY DEPT VISIT MOD MDM: CPT | Mod: FS

## 2023-07-12 PROCEDURE — 99284 EMERGENCY DEPT VISIT MOD MDM: CPT | Mod: 25

## 2023-07-12 PROCEDURE — 96374 THER/PROPH/DIAG INJ IV PUSH: CPT

## 2023-07-12 PROCEDURE — 93005 ELECTROCARDIOGRAM TRACING: CPT

## 2023-07-12 RX ORDER — METOCLOPRAMIDE HCL 10 MG
10 TABLET ORAL ONCE
Refills: 0 | Status: COMPLETED | OUTPATIENT
Start: 2023-07-12 | End: 2023-07-12

## 2023-07-12 RX ORDER — SODIUM CHLORIDE 9 MG/ML
1000 INJECTION, SOLUTION INTRAVENOUS ONCE
Refills: 0 | Status: COMPLETED | OUTPATIENT
Start: 2023-07-12 | End: 2023-07-12

## 2023-07-12 RX ORDER — KETOROLAC TROMETHAMINE 30 MG/ML
30 SYRINGE (ML) INJECTION ONCE
Refills: 0 | Status: DISCONTINUED | OUTPATIENT
Start: 2023-07-12 | End: 2023-07-12

## 2023-07-12 RX ADMIN — Medication 104 MILLIGRAM(S): at 18:35

## 2023-07-12 RX ADMIN — SODIUM CHLORIDE 1000 MILLILITER(S): 9 INJECTION, SOLUTION INTRAVENOUS at 18:47

## 2023-07-12 NOTE — ED PROVIDER NOTE - OBJECTIVE STATEMENT
72 yo female, pmh of gerd, RF, htn, hypothyroidism, migraines, presents to er for ha, left sided, mild, aching, no radiation. Denies fever, chills, cp, sob, neck pain, visual changes, nvd, dizziness, numbness, tingling.

## 2023-07-12 NOTE — ED PROVIDER NOTE - PATIENT PORTAL LINK FT
You can access the FollowMyHealth Patient Portal offered by [facility] by registering at the following website: [email or web address]. By joining [employer]’s FollowMyHealth portal, you will also be able to view your health information using other applications (apps) compatible with our system.

## 2023-07-12 NOTE — ED PROVIDER NOTE - CARE PROVIDER_API CALL
Triston Morgan  Neurology  [address] [place] [address], [address] [address]  Phone: [phone]  Fax: [phone]  Follow Up Time: 1-3 Days

## 2023-07-12 NOTE — ED PROVIDER NOTE - ATTENDING APP SHARED VISIT CONTRIBUTION OF CARE
73-year-old female past medical history noted presents for evaluation of headache to frontal area.  Patient states she has a history of migraines and took a Fioricet this morning without relief.  Patient admits to increased stress.  No numbness or tingling, no change in vision, mild nausea, no vomiting, on exam patient in NAD, AAOx3, well-groomed, good tone equal strength, PERRL, EOMI, CN

## 2023-07-12 NOTE — ED PROVIDER NOTE - CLINICAL SUMMARY MEDICAL DECISION MAKING FREE TEXT BOX
Patient symptoms treated.  Patient feels improved.  Patient will follow with PMD and return if any worsening symptoms or concerns

## 2023-07-12 NOTE — ED PROVIDER NOTE - PHYSICAL EXAMINATION
Physical Exam    Vital Signs: I have reviewed the initial vital signs.  Constitutional: appears stated age, no acute distress  Eyes: Conjunctiva pink, Sclera clear  Cardiovascular: S1 and S2, regular rate, regular rhythm, well-perfused extremities, radial pulses equal and 2+, pedal pulses 2+ and equal  Respiratory: unlabored respiratory effort, clear to auscultation bilaterally no wheezing, rales and rhonchi  Gastrointestinal: soft, non-tender abdomen, no pulsatile mass, normal bowl sounds  Musculoskeletal: supple neck, no lower extremity edema, no midline tenderness  Integumentary: warm, dry, no rash  Neurologic: awake, alert, cranial nerves II-XII grossly intact, extremities’ motor and sensory functions grossly intact  Psychiatric: appropriate mood, appropriate affect

## 2023-07-19 ENCOUNTER — APPOINTMENT (OUTPATIENT)
Dept: PULMONOLOGY | Facility: CLINIC | Age: 73
End: 2023-07-19
Payer: MEDICARE

## 2023-07-19 ENCOUNTER — NON-APPOINTMENT (OUTPATIENT)
Age: 73
End: 2023-07-19

## 2023-07-19 PROCEDURE — 94729 DIFFUSING CAPACITY: CPT

## 2023-07-19 PROCEDURE — 94727 GAS DIL/WSHOT DETER LNG VOL: CPT

## 2023-07-19 PROCEDURE — 94010 BREATHING CAPACITY TEST: CPT

## 2023-07-20 ENCOUNTER — EMERGENCY (EMERGENCY)
Facility: HOSPITAL | Age: 73
LOS: 0 days | Discharge: LEFT BEFORE DISCHARGE | End: 2023-07-20
Attending: STUDENT IN AN ORGANIZED HEALTH CARE EDUCATION/TRAINING PROGRAM
Payer: MEDICARE

## 2023-07-20 VITALS
SYSTOLIC BLOOD PRESSURE: 139 MMHG | TEMPERATURE: 98 F | DIASTOLIC BLOOD PRESSURE: 72 MMHG | RESPIRATION RATE: 18 BRPM | HEIGHT: 69 IN | HEART RATE: 72 BPM | WEIGHT: 210.1 LBS | OXYGEN SATURATION: 99 %

## 2023-07-20 VITALS
OXYGEN SATURATION: 99 % | RESPIRATION RATE: 18 BRPM | SYSTOLIC BLOOD PRESSURE: 140 MMHG | DIASTOLIC BLOOD PRESSURE: 63 MMHG | HEART RATE: 77 BPM

## 2023-07-20 DIAGNOSIS — Z91.040 LATEX ALLERGY STATUS: ICD-10-CM

## 2023-07-20 DIAGNOSIS — I10 ESSENTIAL (PRIMARY) HYPERTENSION: ICD-10-CM

## 2023-07-20 DIAGNOSIS — Z86.79 PERSONAL HISTORY OF OTHER DISEASES OF THE CIRCULATORY SYSTEM: ICD-10-CM

## 2023-07-20 DIAGNOSIS — M54.2 CERVICALGIA: ICD-10-CM

## 2023-07-20 DIAGNOSIS — Z98.42 CATARACT EXTRACTION STATUS, LEFT EYE: ICD-10-CM

## 2023-07-20 DIAGNOSIS — Z98.890 OTHER SPECIFIED POSTPROCEDURAL STATES: Chronic | ICD-10-CM

## 2023-07-20 DIAGNOSIS — Z98.891 HISTORY OF UTERINE SCAR FROM PREVIOUS SURGERY: Chronic | ICD-10-CM

## 2023-07-20 DIAGNOSIS — M25.512 PAIN IN LEFT SHOULDER: ICD-10-CM

## 2023-07-20 DIAGNOSIS — Z53.29 PROCEDURE AND TREATMENT NOT CARRIED OUT BECAUSE OF PATIENT'S DECISION FOR OTHER REASONS: ICD-10-CM

## 2023-07-20 DIAGNOSIS — E03.9 HYPOTHYROIDISM, UNSPECIFIED: ICD-10-CM

## 2023-07-20 DIAGNOSIS — Z88.0 ALLERGY STATUS TO PENICILLIN: ICD-10-CM

## 2023-07-20 DIAGNOSIS — M79.89 OTHER SPECIFIED SOFT TISSUE DISORDERS: ICD-10-CM

## 2023-07-20 DIAGNOSIS — Z88.5 ALLERGY STATUS TO NARCOTIC AGENT: ICD-10-CM

## 2023-07-20 DIAGNOSIS — Z88.4 ALLERGY STATUS TO ANESTHETIC AGENT: ICD-10-CM

## 2023-07-20 DIAGNOSIS — G43.909 MIGRAINE, UNSPECIFIED, NOT INTRACTABLE, WITHOUT STATUS MIGRAINOSUS: ICD-10-CM

## 2023-07-20 DIAGNOSIS — G47.33 OBSTRUCTIVE SLEEP APNEA (ADULT) (PEDIATRIC): ICD-10-CM

## 2023-07-20 DIAGNOSIS — Z91.048 OTHER NONMEDICINAL SUBSTANCE ALLERGY STATUS: ICD-10-CM

## 2023-07-20 DIAGNOSIS — Z91.041 RADIOGRAPHIC DYE ALLERGY STATUS: ICD-10-CM

## 2023-07-20 DIAGNOSIS — J44.9 CHRONIC OBSTRUCTIVE PULMONARY DISEASE, UNSPECIFIED: ICD-10-CM

## 2023-07-20 DIAGNOSIS — Z88.6 ALLERGY STATUS TO ANALGESIC AGENT: ICD-10-CM

## 2023-07-20 DIAGNOSIS — Z99.89 DEPENDENCE ON OTHER ENABLING MACHINES AND DEVICES: ICD-10-CM

## 2023-07-20 DIAGNOSIS — Z98.42 CATARACT EXTRACTION STATUS, LEFT EYE: Chronic | ICD-10-CM

## 2023-07-20 DIAGNOSIS — Z88.8 ALLERGY STATUS TO OTHER DRUGS, MEDICAMENTS AND BIOLOGICAL SUBSTANCES: ICD-10-CM

## 2023-07-20 LAB
ANION GAP SERPL CALC-SCNC: 10 MMOL/L — SIGNIFICANT CHANGE UP (ref 7–14)
BASOPHILS # BLD AUTO: 0.04 K/UL — SIGNIFICANT CHANGE UP (ref 0–0.2)
BASOPHILS NFR BLD AUTO: 0.5 % — SIGNIFICANT CHANGE UP (ref 0–1)
BUN SERPL-MCNC: 17 MG/DL — SIGNIFICANT CHANGE UP (ref 10–20)
CALCIUM SERPL-MCNC: 9.2 MG/DL — SIGNIFICANT CHANGE UP (ref 8.4–10.5)
CHLORIDE SERPL-SCNC: 95 MMOL/L — LOW (ref 98–110)
CO2 SERPL-SCNC: 29 MMOL/L — SIGNIFICANT CHANGE UP (ref 17–32)
CREAT SERPL-MCNC: 0.7 MG/DL — SIGNIFICANT CHANGE UP (ref 0.7–1.5)
EGFR: 91 ML/MIN/1.73M2 — SIGNIFICANT CHANGE UP
EOSINOPHIL # BLD AUTO: 0.14 K/UL — SIGNIFICANT CHANGE UP (ref 0–0.7)
EOSINOPHIL NFR BLD AUTO: 1.8 % — SIGNIFICANT CHANGE UP (ref 0–8)
GLUCOSE SERPL-MCNC: 104 MG/DL — HIGH (ref 70–99)
HCT VFR BLD CALC: 37.6 % — SIGNIFICANT CHANGE UP (ref 37–47)
HGB BLD-MCNC: 12.6 G/DL — SIGNIFICANT CHANGE UP (ref 12–16)
IMM GRANULOCYTES NFR BLD AUTO: 0.4 % — HIGH (ref 0.1–0.3)
LYMPHOCYTES # BLD AUTO: 2.22 K/UL — SIGNIFICANT CHANGE UP (ref 1.2–3.4)
LYMPHOCYTES # BLD AUTO: 29 % — SIGNIFICANT CHANGE UP (ref 20.5–51.1)
MCHC RBC-ENTMCNC: 30.3 PG — SIGNIFICANT CHANGE UP (ref 27–31)
MCHC RBC-ENTMCNC: 33.5 G/DL — SIGNIFICANT CHANGE UP (ref 32–37)
MCV RBC AUTO: 90.4 FL — SIGNIFICANT CHANGE UP (ref 81–99)
MONOCYTES # BLD AUTO: 0.84 K/UL — HIGH (ref 0.1–0.6)
MONOCYTES NFR BLD AUTO: 11 % — HIGH (ref 1.7–9.3)
NEUTROPHILS # BLD AUTO: 4.39 K/UL — SIGNIFICANT CHANGE UP (ref 1.4–6.5)
NEUTROPHILS NFR BLD AUTO: 57.3 % — SIGNIFICANT CHANGE UP (ref 42.2–75.2)
NRBC # BLD: 0 /100 WBCS — SIGNIFICANT CHANGE UP (ref 0–0)
PLATELET # BLD AUTO: 223 K/UL — SIGNIFICANT CHANGE UP (ref 130–400)
PMV BLD: 9.7 FL — SIGNIFICANT CHANGE UP (ref 7.4–10.4)
POTASSIUM SERPL-MCNC: 4.3 MMOL/L — SIGNIFICANT CHANGE UP (ref 3.5–5)
POTASSIUM SERPL-SCNC: 4.3 MMOL/L — SIGNIFICANT CHANGE UP (ref 3.5–5)
RBC # BLD: 4.16 M/UL — LOW (ref 4.2–5.4)
RBC # FLD: 13.5 % — SIGNIFICANT CHANGE UP (ref 11.5–14.5)
SODIUM SERPL-SCNC: 134 MMOL/L — LOW (ref 135–146)
WBC # BLD: 7.66 K/UL — SIGNIFICANT CHANGE UP (ref 4.8–10.8)
WBC # FLD AUTO: 7.66 K/UL — SIGNIFICANT CHANGE UP (ref 4.8–10.8)

## 2023-07-20 PROCEDURE — 70491 CT SOFT TISSUE NECK W/DYE: CPT | Mod: MA

## 2023-07-20 PROCEDURE — 70491 CT SOFT TISSUE NECK W/DYE: CPT | Mod: 26,MA

## 2023-07-20 PROCEDURE — 80048 BASIC METABOLIC PNL TOTAL CA: CPT

## 2023-07-20 PROCEDURE — 36415 COLL VENOUS BLD VENIPUNCTURE: CPT

## 2023-07-20 PROCEDURE — 85025 COMPLETE CBC W/AUTO DIFF WBC: CPT

## 2023-07-20 PROCEDURE — 99284 EMERGENCY DEPT VISIT MOD MDM: CPT | Mod: 25

## 2023-07-20 PROCEDURE — 99285 EMERGENCY DEPT VISIT HI MDM: CPT

## 2023-07-20 PROCEDURE — 96374 THER/PROPH/DIAG INJ IV PUSH: CPT | Mod: XU

## 2023-07-20 RX ORDER — HYDROCHLOROTHIAZIDE 25 MG
1 TABLET ORAL
Qty: 0 | Refills: 0 | DISCHARGE

## 2023-07-20 RX ORDER — DIPHENHYDRAMINE HCL 50 MG
50 CAPSULE ORAL ONCE
Refills: 0 | Status: COMPLETED | OUTPATIENT
Start: 2023-07-20 | End: 2023-07-20

## 2023-07-20 RX ADMIN — Medication 102 MILLIGRAM(S): at 13:46

## 2023-07-20 NOTE — ED PROVIDER NOTE - PROGRESS NOTE DETAILS
discussed ct w/ iv con vs noncon. pt states she has been pre-treated w/ benadryl and has been OK.  pt refused steroids.

## 2023-07-20 NOTE — ED ADULT NURSE NOTE - CHIEF COMPLAINT QUOTE
Sent in by [facility] for lump on L side of neck, noticed inc in swelling yesterday

## 2023-07-20 NOTE — ED PROVIDER NOTE - CLINICAL SUMMARY MEDICAL DECISION MAKING FREE TEXT BOX
Throughout ED observation period, pt remained clinically and hemodynamically stable.  labs and imaging w/o acute findings, d/w rads attending- no suggestion of malignancy, vessels appear patent, no abscess  no rxn after ct  pt tolerating PO  swelling improves w/ laying flat  no erythema, fluctuance, fever, leukocytosis to suggest infection  pt has ortho f/u within the next week  advised pt to f/u w/ ENT, pt states she will find a private ent

## 2023-07-20 NOTE — ED PROVIDER NOTE - OBJECTIVE STATEMENT
72 yo f Atrial tachycardia, ETHAN on CPAP, COPD, hypothyroidism, HTN  pt presents for eval of L neck, supraclavicular swelling. no trauma but pt has chronic L neck and shoulder pain for which she has been following up and has apt Monday. pt denies fevers, chills, change in appetite, weight loss. pt states she did virtual UC visit and was told to come to ED. pt w/ baseline pain on neck movement, no acute worsening of neck pain or limitation of ROM.  no vertigo/lightheadedness/numbness. pt w/ baseline LUE ROm limitation from shoulder pain.

## 2023-07-20 NOTE — ED ADULT NURSE NOTE - NSICDXPASTMEDICALHX_GEN_ALL_CORE_FT
PAST MEDICAL HISTORY:  Cataract     Gastroesophageal reflux disease     H/O: rheumatic fever     Herniated disc, cervical     History of cholecystectomy     History of ear surgery     History of palpitations     History of surgery on arm     Hypertension     Hypothyroid     Migraines     Mitral valve prolapse     ETHAN on CPAP     Pulmonary embolism     Uterine polyp

## 2023-07-20 NOTE — ED PROVIDER NOTE - NSFOLLOWUPINSTRUCTIONS_ED_ALL_ED_FT
Follow up with orthopedic surgery and ENT within 1 week  Avoid overuse, heavy lifting with left arm  Apply cool packs and take tylenol as needed for pain  Return for increase in swelling, shortness of breath, chest pain, redness to area or other concerning symptoms

## 2023-07-20 NOTE — ED ADULT TRIAGE NOTE - CHIEF COMPLAINT QUOTE
Sent in by [facility] for lump on L side of neck, noticed inc in swelling yesterday

## 2023-07-20 NOTE — ED ADULT NURSE NOTE - NSFALLUNIVINTERV_ED_ALL_ED
Bed/Stretcher in lowest position, wheels locked, appropriate side rails in place/Call bell, personal items and telephone in reach/Instruct patient to call for assistance before getting out of bed/chair/stretcher/Non-slip footwear applied when patient is off stretcher/[place] to call system/Physically safe environment - no spills, clutter or unnecessary equipment/Purposeful proactive rounding/Room/bathroom lighting operational, light cord in reach

## 2023-07-20 NOTE — ED PROVIDER NOTE - PHYSICAL EXAMINATION
vss  gen- NAD, aaox3  card-rrr  lungs-ctab, no wheezing or rhonchi  neuro- full str/sensation, cn ii-xii grossly intact, normal coordination    Spine- no midline c spine ttp, neck supple, FROM to neck  msk- L lateral neck/shoulder/supraclavicular soft w/ swelling, no erythema, no tenderness, no fluctuance, no pulsatility, no nodules/abnormal lymphadenopathy  HENT- no lip/tongue/uvula/tonsillar swelling, no exudates on tonsils, uvula midline, base of tongue normal, pt tolerating secretions. no drooling, no stridor.  no hot potato voice

## 2023-07-31 NOTE — PACU DISCHARGE NOTE - NS MD DISCHARGE NOTE DISCHARGE
Home
How Severe Is It?: moderate
Is This A New Presentation, Or A Follow-Up?: Follow Up Hidradenitis Suppurativa
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

## 2023-08-24 NOTE — ED ADULT NURSE NOTE - NSIMPLEMENTINTERV_GEN_ALL_ED
PACU Implemented All Fall Risk Interventions:  [place] to call system. Call bell, personal items and telephone within reach. Instruct patient to call for assistance. Room bathroom lighting operational. Non-slip footwear when patient is off stretcher. Physically safe environment: no spills, clutter or unnecessary equipment. Stretcher in lowest position, wheels locked, appropriate side rails in place. Provide visual cue, wrist band, yellow gown, etc. Monitor gait and stability. Monitor for mental status changes and reorient to person, place, and time. Review medications for side effects contributing to fall risk. Reinforce activity limits and safety measures with patient and family.

## 2023-08-30 NOTE — ED ADULT NURSE NOTE - IN THE PAST 12 MONTHS HAVE YOU USED DRUGS OTHER THAN THOSE REQUIRED FOR MEDICAL REASON?
Inpatient Cardiology Progress Note        Patient: Jordana Mendoza Patient Status:  Inpatient    1928 MRN [id number]   Location [facility] [address] Attending Madalyn Hannah MD   Hosp Day # 4 PCP Akhil Amanda DO e.  89-year-old patient history atrial fibrillation hypertension presents with increased edema.  Patient is in the nursing facility she is not sure who it if any is her cardiologist.  She is on Eliquis and Lasix which she is given with no missed doses.  She presents now with edema orthopnea weight gain shortness of breath.  She denies chest pain tightness pressure.  No fever chills or other complaints.  Subjective:  Feels much better this a.m.    Medications:  Current Facility-Administered Medications   Medication Dose Route Frequency Provider Last Rate Last Admin   • torsemide (DEMADEX) tablet 10 mg  10 mg Oral BID Kehinde Link MD   10 mg at 23   • metoPROLOL succinate (TOPROL-XL) ER tablet 12.5 mg  12.5 mg Oral Daily Kehinde Link MD   12.5 mg at 23   • calcium carbonate-vitamin D (CALTRATE+D) 600-10 MG-MCG tablet 1 tablet  1 tablet Oral BID  Madalyn Hannah MD   1 tablet at 23   • docusate sodium (COLACE) capsule 100 mg  100 mg Oral BID Madalyn Hannah MD   100 mg at 23   • lidocaine (LIDOCARE) 4 % patch 1 patch  1 patch Transdermal Nightly Madalyn Hannah MD   1 patch at 23   • apixaBAN (ELIQUIS) tablet 5 mg  5 mg Oral 2 times per day Kehinde Link MD   5 mg at 23   • atorvastatin (LIPITOR) tablet 40 mg  40 mg Oral Nightly Kehinde Link MD   40 mg at 23   • sodium chloride (PF) 0.9 % injection 2 mL  2 mL Intracatheter 2 times per day Tasha Smith DO   2 mL at 23   • Potassium Standard Replacement Protocol (Levels 3.5 and lower)   Does not apply See Admin Instructions Tasha Smith DO       • Magnesium Standard Replacement Protocol   Does not apply See Admin  Instructions Tasha Smith, DO       • levothyroxine (SYNTHROID, LEVOTHROID) tablet 50 mcg  50 mcg Oral QAM AC Suri Smithna SARIKA DO   50 mcg at 08/30/23 0547   • fluticasone (FLONASE) 50 MCG/ACT nasal spray 1 spray  1 spray Nasal Daily Tasha Smith DO   1 spray at 08/29/23 1018   • timolol (TIMOPTIC) 0.5 % ophthalmic solution 1 drop  1 drop Both Eyes Daily Tasha Smith DO   1 drop at 08/29/23 0822          Allergies:  ALLERGIES:  Lisinopril, Penicillins, and Sulfa antibiotics      Intake/Output:    Intake/Output Summary (Last 24 hours) at 8/30/2023 0749  Last data filed at 8/30/2023 0310  Gross per 24 hour   Intake 200 ml   Output 1450 ml   Net -1250 ml     Weight    08/27/23 0600 08/28/23 0600 08/29/23 0453 08/30/23 0545   Weight: 89.9 kg (198 lb 3.1 oz) 88.9 kg (195 lb 15.8 oz) 87 kg (191 lb 12.8 oz) 85 kg (187 lb 6.3 oz)           Physical Exam:    Temp:  [97.2 °F (36.2 °C)-98.1 °F (36.7 °C)] 98.1 °F (36.7 °C)  Heart Rate:  [51-91] 51  Resp:  [16-18] 18  BP: (101-137)/(53-80) 122/74    General: No apparent distress  HEENT: No focal deficits.  Neck: supple. JVP normal  Cardiac: irregular rhythm, S1, S2 normal,  rub or gallop.  No murmur.  Lungs: CTA  Abdomen: Soft, non-tender.   Extremities:  edema  Neurologic: no focal deficits  Skin: Warm and dry.     Telemetry: Atrial fibrillation  EKG:  Encounter Date: 08/26/23   Electrocardiogram 12-Lead   Result Value    Ventricular Rate EKG/Min (BPM) 105    Atrial Rate (BPM) 102    QRS-Interval (MSEC) 112    QT-Interval (MSEC) 328    QTc 433    R Axis (Degrees) -26    T Axis (Degrees) 58    REPORT TEXT      Atrial fibrillation  with rapid ventricular response  with premature ventricular or aberrantly conducted complexes  Incomplete left bundle branch block  Nonspecific ST and T wave abnormality  Abnormal ECG  No previous ECGs available  Confirmed by HONEY DRAPER MD ([id number]) on 8/27/2023 10:41:40 AM                Labs:  CMP  Recent Labs      08/28/23  0451 08/28/23  1303 08/29/23  0625 08/30/23  0450   SODIUM 141  --  141 143   POTASSIUM 3.5 3.6 4.2 3.7   CO2 30  --  31 32   ANIONGAP 9  --  9 8   GLUCOSE 112*  --  103* 98   BUN 19  --  23* 24*   CREATININE 0.74  --  0.85 0.88   CALCIUM 8.9  --  9.1 9.2        CBC w/ Diff  No results for input(s): \"WBC\", \"RBC\", \"HGB\", \"HCT\", \"PLT\", \"MCV\", \"MCH\", \"MCHC\", \"NRBCRE\", \"NEUT\", \"BAND\", \"LYMP\", \"MON\", \"EOS\", \"BASO\", \"ASEG\", \"ALYMP\", \"AMONO\", \"AEO\", \"ABAS\", \"RMOR\", \"PMOR\" in the last 72 hours.     Cardiology  No results for input(s): \"RAPDTR\", \"NTPROB\", \"PT\", \"INR\" in the last 72 hours.       Impression:    1.  Atrial fibrillation on anticoagulation  2.  Heart failure signs of decompensation  3.  Hypertension  4.  Hypothyroidism  5.  Hyperlipidemia        Recommend:     1.  Heart failure with signs of volume overload       Diurese with IV Lasix       echo reveals reduced LV systolic function       Clinically responding to diuretic -4.8 L       Weight down if accurate        Symptoms and edema have improved        Has unspecified ACE and ARB allergy     2.  Hyperlipidemia resume statin     3.  Atrial fibrillation chronic on anticoagulation Eliquis resume same       Follow on telemetry       Presently heart rate increased but coming down with treatment of heart failure       Was on no rate lowering agents at home       We will consider rate lowering agent depending on rates recorded today            8/30/2023  Continues to diurese  Edema improved  Weight down 25 lbs    Discussed cardiomyopathy treatment options  Patient and family prefer medical management only no invasive or ischemic evaluation planned  No ACE inhibitor or ARB due to allergies of swelling-likely and angio edema  Added low-dose beta-blocker  diuretics to PO    Discharge planning--see our APN 1 week     Kehinde Link MD  8/30/2023   No

## 2023-09-06 PROBLEM — Z90.49 ACQUIRED ABSENCE OF OTHER SPECIFIED PARTS OF DIGESTIVE TRACT: Chronic | Status: ACTIVE | Noted: 2023-07-20

## 2023-09-06 PROBLEM — N84.0 POLYP OF CORPUS UTERI: Chronic | Status: ACTIVE | Noted: 2023-07-20

## 2023-09-06 PROBLEM — I26.99 OTHER PULMONARY EMBOLISM WITHOUT ACUTE COR PULMONALE: Chronic | Status: ACTIVE | Noted: 2023-07-20

## 2023-09-06 PROBLEM — Z98.890 OTHER SPECIFIED POSTPROCEDURAL STATES: Chronic | Status: ACTIVE | Noted: 2023-07-20

## 2023-11-08 ENCOUNTER — APPOINTMENT (OUTPATIENT)
Dept: PULMONOLOGY | Facility: CLINIC | Age: 73
End: 2023-11-08

## 2023-11-30 NOTE — ED PROVIDER NOTE - NSDCPRINTRESULTS_ED_ALL_ED
DATE OF SURGERY:    11/30/23    SURGEON:  Payam Dye MD    PREOPERATIVE DIAGNOSIS:  T9 compression fracture.    POSTOPERATIVE DIAGNOSIS:  LT9 compression fracture.    PROCEDURES PERFORMED:    T9 kyphoplasty.  Bone biopsy at T9.    EQUIPMENT USED:  [employer] Medical kyphoplasty tray.    DESCRIPTION OF PROCEDURE:  Following informed consent, and with the patient under general endotracheal anesthesia, they were prepped and draped in the usual sterile fashion.  Fluoroscopic evaluation demonstrated that the fracture was located at T9 instead of T8.  An incision was made over the left pedicle.  A trocar was advanced via a left transpedicular approach to the posterior third of the vertebral body.  Bone biopsy was performed.  A drill was inserted and advanced to the anterior third of the vertebral body.  This was removed.  A balloon was then inserted and inflated to a maximum pressure of 300 psi.  This was deflated and removed.  Bone cement was prepared and 2 mL were placed.  The cannula was carefully removed.  Final fluoroscopic AP and lateral views demonstrated good cement filling.  The patient tolerated the procedure well without apparent complication.      ______________________________  Payam Dye MD   
Patient requests all Lab, Cardiology, and Radiology Results on their Discharge Instructions

## 2023-12-13 ENCOUNTER — APPOINTMENT (OUTPATIENT)
Dept: CARDIOLOGY | Facility: CLINIC | Age: 73
End: 2023-12-13
Payer: MEDICARE

## 2023-12-13 VITALS — WEIGHT: 206 LBS | TEMPERATURE: 97.6 F | BODY MASS INDEX: 30.51 KG/M2 | HEIGHT: 69 IN

## 2023-12-13 VITALS — HEART RATE: 64 BPM | DIASTOLIC BLOOD PRESSURE: 82 MMHG | SYSTOLIC BLOOD PRESSURE: 124 MMHG

## 2023-12-13 DIAGNOSIS — M54.2 CERVICALGIA: ICD-10-CM

## 2023-12-13 PROCEDURE — 99214 OFFICE O/P EST MOD 30 MIN: CPT

## 2023-12-13 PROCEDURE — 93000 ELECTROCARDIOGRAM COMPLETE: CPT

## 2023-12-13 NOTE — PHYSICAL EXAM
[Well Developed] : well developed [Well Nourished] : well nourished [No Acute Distress] : no acute distress [Normal Conjunctiva] : normal conjunctiva [Normal Venous Pressure] : normal venous pressure [5th Left ICS - MCL] : palpated at the 5th LICS in the midclavicular line [Normal] : normal [No Precordial Heave] : no precordial heave was noted [Normal Rate] : normal [Rhythm Regular] : regular [Normal S1] : normal S1 [Normal S2] : normal S2 [No Murmur] : no murmurs heard [___ +] : bilateral [unfilled]U+ pitting edema to the ankles [2+] : left 2+ [Clear Lung Fields] : clear lung fields [Good Air Entry] : good air entry [No Respiratory Distress] : no respiratory distress  [Soft] : abdomen soft [Non Tender] : non-tender [Normal Bowel Sounds] : normal bowel sounds [Normal Gait] : normal gait [No Varicosities] : no varicosities [Edema ___] : edema [unfilled] [No Rash] : no rash [Moves all extremities] : moves all extremities [No Focal Deficits] : no focal deficits [Alert and Oriented] : alert and oriented

## 2023-12-13 NOTE — REASON FOR VISIT
[Other: ____] : [unfilled] [FreeTextEntry1] : Diagnostic Tests: --------------------- EK23-NSR. NSST changes.  23-NSR. NSST changes.  10/12/22-NSR. Nonspecific ST changes.  22-NSR. Minimal ST depressions.  21-NSR. Nonspecific ST changes.  --------------------- Echo: 22-TTE: EF 69%. Mild AI, PI.  --------------------- Physio: 22-HESHAM/PVR: Normal HESHAM B/L. PVR nondiagnostic due to pain.  --------------------- US: 05/15/23-Carotid: <50% B/L ICA stenosis.  22-LE Arterial: B/L mild diffuse.  21-LE Venous: No DVT, superficial thrombophlebitis.  --------------------- CT: 21-CCTA:  (50-75%ile, LM and LAD). Motion artifact precludes accurate assessment.

## 2023-12-13 NOTE — ASSESSMENT
[FreeTextEntry1] : Dizziness: Likely due to BPPV and vertigo -Continue to follow with ENT.   Palpitations - will continue Diltiazem  mg PO daily  Chest pain - CTA from 2021 showed elevated Ca score and calcification in the distribution of LM - TTE showed normal LV function - patient complains of occasional chest pain -If able to find facility who will perform CCTA without NTG, will set up for repeat study.   LE claudication - no significant PAD on arterial Doppler - DVT ruled out by venous Doppler.  Neck pain: -No significant carotid stenosis.   Follow up in 3-4 months

## 2023-12-16 LAB
CHOLEST SERPL-MCNC: 187 MG/DL
ESTIMATED AVERAGE GLUCOSE: 117 MG/DL
HBA1C MFR BLD HPLC: 5.7 %
HDLC SERPL-MCNC: 57 MG/DL
LDLC SERPL CALC-MCNC: 102 MG/DL
NONHDLC SERPL-MCNC: 130 MG/DL
TRIGL SERPL-MCNC: 141 MG/DL

## 2024-01-22 ENCOUNTER — EMERGENCY (EMERGENCY)
Facility: HOSPITAL | Age: 74
LOS: 0 days | Discharge: HOME | End: 2024-01-22
Attending: EMERGENCY MEDICINE
Payer: MEDICARE

## 2024-01-22 VITALS
SYSTOLIC BLOOD PRESSURE: 137 MMHG | RESPIRATION RATE: 18 BRPM | WEIGHT: 205.91 LBS | TEMPERATURE: 98 F | HEIGHT: 69 IN | DIASTOLIC BLOOD PRESSURE: 65 MMHG | OXYGEN SATURATION: 98 % | HEART RATE: 69 BPM

## 2024-01-22 VITALS — DIASTOLIC BLOOD PRESSURE: 70 MMHG | SYSTOLIC BLOOD PRESSURE: 127 MMHG | HEART RATE: 72 BPM

## 2024-01-22 DIAGNOSIS — Z88.6 ALLERGY STATUS TO ANALGESIC AGENT: ICD-10-CM

## 2024-01-22 DIAGNOSIS — Z91.040 LATEX ALLERGY STATUS: ICD-10-CM

## 2024-01-22 DIAGNOSIS — Z88.8 ALLERGY STATUS TO OTHER DRUGS, MEDICAMENTS AND BIOLOGICAL SUBSTANCES: ICD-10-CM

## 2024-01-22 DIAGNOSIS — Z98.42 CATARACT EXTRACTION STATUS, LEFT EYE: Chronic | ICD-10-CM

## 2024-01-22 DIAGNOSIS — Z91.09 OTHER ALLERGY STATUS, OTHER THAN TO DRUGS AND BIOLOGICAL SUBSTANCES: ICD-10-CM
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
## 2024-01-22 DIAGNOSIS — S09.90XA UNSPECIFIED INJURY OF HEAD, INITIAL ENCOUNTER: ICD-10-CM

## 2024-01-22 DIAGNOSIS — Z91.041 RADIOGRAPHIC DYE ALLERGY STATUS: ICD-10-CM

## 2024-01-22 DIAGNOSIS — M25.512 PAIN IN LEFT SHOULDER: ICD-10-CM

## 2024-01-22 DIAGNOSIS — Z88.0 ALLERGY STATUS TO PENICILLIN: ICD-10-CM
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
## 2024-01-22 DIAGNOSIS — Z98.890 OTHER SPECIFIED POSTPROCEDURAL STATES: Chronic | ICD-10-CM
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
## 2024-01-22 DIAGNOSIS — Z88.5 ALLERGY STATUS TO NARCOTIC AGENT: ICD-10-CM

## 2024-01-22 DIAGNOSIS — Z98.891 HISTORY OF UTERINE SCAR FROM PREVIOUS SURGERY: Chronic | ICD-10-CM

## 2024-01-22 PROCEDURE — 99284 EMERGENCY DEPT VISIT MOD MDM: CPT | Mod: 25

## 2024-01-22 PROCEDURE — 70450 CT HEAD/BRAIN W/O DYE: CPT | Mod: 26,MA

## 2024-01-22 PROCEDURE — 73030 X-RAY EXAM OF SHOULDER: CPT | Mod: 26,LT

## 2024-01-22 PROCEDURE — 71046 X-RAY EXAM CHEST 2 VIEWS: CPT | Mod: 26

## 2024-01-22 PROCEDURE — 71046 X-RAY EXAM CHEST 2 VIEWS: CPT

## 2024-01-22 PROCEDURE — 70450 CT HEAD/BRAIN W/O DYE: CPT | Mod: MA

## 2024-01-22 PROCEDURE — 99285 EMERGENCY DEPT VISIT HI MDM: CPT | Mod: FS

## 2024-01-22 PROCEDURE — 73030 X-RAY EXAM OF SHOULDER: CPT | Mod: LT

## 2024-01-22 RX ORDER — ACETAMINOPHEN 500 MG
650 TABLET ORAL ONCE
Refills: 0 | Status: COMPLETED | OUTPATIENT
Start: 2024-01-22 | End: 2024-01-22

## 2024-01-22 RX ADMIN — Medication 650 MILLIGRAM(S): at 11:57

## 2024-01-22 NOTE — ED PROVIDER NOTE - OBJECTIVE STATEMENT
73-year-old female presents to the ED status post fall.  Patient states at work she was on a rolling chair the chair slid out from under her she fell back and hit her head.  There was no LOC.  Patient is complaining about left shoulder pain

## 2024-01-22 NOTE — ED PROVIDER NOTE - ATTENDING APP SHARED VISIT CONTRIBUTION OF CARE
I have personally performed a history and physical exam on this patient and personally directed the management of the patient. Patient is a 73-year-old female status post fall states that she was on a rolling chair which slowed down from where she fell backwards hit her head she sustained no LOC no vomiting denies any headache visual changes chest pain shortness of breath abdominal pain back pain denies any other fevers or chills patient is ambulatory    On physical exam patient has normocephalic atraumatic pupils equal round reactive light accommodation extraocular muscles intact oropharynx clear chest clear to auscultation bilaterally abdomen soft nontender nondistended bowel sounds positive no guarding or rebound extremities full range of motion no focal deficits noted patient is able to ambulate no midline tenderness CT or L-spine    Assessment plan we obtained CT head which is negative we obtained x-rays per my depend evaluation not consistent with fracture not consistent with pneumothorax or pneumonia patient improved here in the emergency department she has a normal neurologic exam and no vomiting no LOC GCS 15 I will discharge follow-up to her PMD in the next 24 to 48 hours

## 2024-01-22 NOTE — ED ADULT NURSE NOTE - PAIN: PRESENCE, MLM
[FreeTextEntry3] : I personally saw and examined the patient in detail. I spoke to KARLA Dotson regarding the assessment and plan of care.  I preformed the procedures and I reviewed the above assessment and plan of care, and agree. I have made changes in changes in the body of the note where appropriate. complains of pain/discomfort

## 2024-01-22 NOTE — ED ADULT NURSE NOTE - AS PAIN REST
Verified Results  (Q) LIPID PANEL WITH REFLEX TO DIRECT LDL [id number] 07:33AM Jorge Brady     Test Name Result Flag Reference   CHOLESTEROL, TOTAL 180 mg/dL  <200   HDL CHOLESTEROL 85 mg/dL  >81   TRIGLICERIDES 66 mg/dL  <417   LDL-CHOLESTEROL 80 mg/dL (calc)     Reference range: <100     Desirable range <100 mg/dL for patients with CHD or  diabetes and <70 mg/dL for diabetic patients with  known heart disease  LDL-C is now calculated using the Celso-Carrasco   calculation, which is a validated novel method providing   better accuracy than the Friedewald equation in the   estimation of LDL-C  Ron Lozoya  [facility]  [id number];741(15): [id number]   (http://to be/faq/EHS091)   CHOL/HDLC RATIO 2 1 (calc)  <5 0   NON HDL CHOLESTEROL 95 mg/dL (calc)  <130   For patients with diabetes plus 1 major ASCVD risk   factor, treating to a non-HDL-C goal of <100 mg/dL   (LDL-C of <70 mg/dL) is considered a therapeutic   option  3 (mild pain)

## 2024-01-22 NOTE — ED PROVIDER NOTE - PHYSICAL EXAMINATION
--EXAM--  VITAL SIGNS: I have reviewed vs documented at present.  CONSTITUTIONAL: Well-developed; well-nourished; in no acute distress.   SKIN: Warm and dry, no acute rash.   HEAD: Normocephalic; atraumatic.  EYES: PERRL, EOM intact; conjunctiva and sclera clear. No nystagmus.  ENT: No nasal discharge; airway clear.  NECK: Supple; non tender.  CARD: S1, S2, Regular rate and rhythm.   RESP: No wheezes, rales or rhonchi.  Left shoulder limited range of motion secondary to pain there is no tenderness no deformity.  Right shoulder no tenderness no deformity limited range of motion which is chronic patient is status post right shoulder surgery few years ago  EXT: Normal ROM.   NEURO: Alert, oriented, grossly unremarkable. Strength 5/5 in all extremities. Sensation intact throughout.  PSYCH: Cooperative, appropriate.

## 2024-01-22 NOTE — ED PROVIDER NOTE - WR ORDER DATE AND TIME 1
"Chief Complaint   Patient presents with     Pre-Op Exam       Initial /80 (BP Location: Left arm, Patient Position: Chair, Cuff Size: Adult Regular)  Pulse 60  Temp 97  F (36.1  C) (Temporal)  Wt 253 lb 9.6 oz (115 kg)  SpO2 98%  BMI 33.46 kg/m2 Estimated body mass index is 33.46 kg/(m^2) as calculated from the following:    Height as of 10/9/17: 6' 1\" (1.854 m).    Weight as of this encounter: 253 lb 9.6 oz (115 kg).  Medication Reconciliation: complete  Health Maintenance reviewed at today's visit patient asked to schedule/complete:   Colon Cancer:  Patient agrees to schedule   Ary ANGUIANO      " 22-Jan-2024 11:50

## 2024-01-22 NOTE — ED PROVIDER NOTE - CLINICAL SUMMARY MEDICAL DECISION MAKING FREE TEXT BOX
plan we obtained CT head which is negative we obtained x-rays per my depend evaluation not consistent with fracture not consistent with pneumothorax or pneumonia patient improved here in the emergency department she has a normal neurologic exam and no vomiting no LOC GCS 15 I will discharge follow-up to her PMD in the next 24 to 48 hours

## 2024-01-22 NOTE — ED PROVIDER NOTE - PATIENT PORTAL LINK FT
You can access the FollowMyHealth Patient Portal offered by [facility] by registering at the following website: [email or web address]. By joining [email or web address]’s FollowMyHealth portal, you will also be able to view your health information using other applications (apps) compatible with our system.

## 2024-02-19 ENCOUNTER — INPATIENT (INPATIENT)
Facility: HOSPITAL | Age: 74
LOS: 0 days | Discharge: HOME | DRG: 310 | End: 2024-02-20
Attending: INTERNAL MEDICINE | Admitting: INTERNAL MEDICINE
Payer: MEDICARE

## 2024-02-19 VITALS
WEIGHT: 205.91 LBS | TEMPERATURE: 97 F | HEART RATE: 88 BPM | HEIGHT: 69 IN | DIASTOLIC BLOOD PRESSURE: 65 MMHG | SYSTOLIC BLOOD PRESSURE: 139 MMHG | RESPIRATION RATE: 20 BRPM | OXYGEN SATURATION: 100 %

## 2024-02-19 DIAGNOSIS — Z98.42 CATARACT EXTRACTION STATUS, LEFT EYE: Chronic | ICD-10-CM

## 2024-02-19 DIAGNOSIS — Z98.890 OTHER SPECIFIED POSTPROCEDURAL STATES: Chronic | ICD-10-CM

## 2024-02-19 DIAGNOSIS — Z98.891 HISTORY OF UTERINE SCAR FROM PREVIOUS SURGERY: Chronic | ICD-10-CM

## 2024-02-19 LAB
ALBUMIN SERPL ELPH-MCNC: 4.4 G/DL — SIGNIFICANT CHANGE UP (ref 3.5–5.2)
ALP SERPL-CCNC: 122 U/L — HIGH (ref 30–115)
ALT FLD-CCNC: 21 U/L — SIGNIFICANT CHANGE UP (ref 0–41)
ANION GAP SERPL CALC-SCNC: 9 MMOL/L — SIGNIFICANT CHANGE UP (ref 7–14)
AST SERPL-CCNC: 36 U/L — SIGNIFICANT CHANGE UP (ref 0–41)
BASOPHILS # BLD AUTO: 0.03 K/UL — SIGNIFICANT CHANGE UP (ref 0–0.2)
BASOPHILS NFR BLD AUTO: 0.3 % — SIGNIFICANT CHANGE UP (ref 0–1)
BILIRUB SERPL-MCNC: <0.2 MG/DL — SIGNIFICANT CHANGE UP (ref 0.2–1.2)
BUN SERPL-MCNC: 21 MG/DL — HIGH (ref 10–20)
CALCIUM SERPL-MCNC: 9.8 MG/DL — SIGNIFICANT CHANGE UP (ref 8.4–10.5)
CHLORIDE SERPL-SCNC: 97 MMOL/L — LOW (ref 98–110)
CO2 SERPL-SCNC: 30 MMOL/L — SIGNIFICANT CHANGE UP (ref 17–32)
CREAT SERPL-MCNC: 0.9 MG/DL — SIGNIFICANT CHANGE UP (ref 0.7–1.5)
EGFR: 68 ML/MIN/1.73M2 — SIGNIFICANT CHANGE UP
EOSINOPHIL # BLD AUTO: 0.23 K/UL — SIGNIFICANT CHANGE UP (ref 0–0.7)
EOSINOPHIL NFR BLD AUTO: 2.3 % — SIGNIFICANT CHANGE UP (ref 0–8)
GLUCOSE SERPL-MCNC: 111 MG/DL — HIGH (ref 70–99)
HCT VFR BLD CALC: 38.1 % — SIGNIFICANT CHANGE UP (ref 37–47)
HGB BLD-MCNC: 13.3 G/DL — SIGNIFICANT CHANGE UP (ref 12–16)
IMM GRANULOCYTES NFR BLD AUTO: 0.2 % — SIGNIFICANT CHANGE UP (ref 0.1–0.3)
LYMPHOCYTES # BLD AUTO: 3.03 K/UL — SIGNIFICANT CHANGE UP (ref 1.2–3.4)
LYMPHOCYTES # BLD AUTO: 30.7 % — SIGNIFICANT CHANGE UP (ref 20.5–51.1)
MAGNESIUM SERPL-MCNC: 2.1 MG/DL — SIGNIFICANT CHANGE UP (ref 1.8–2.4)
MCHC RBC-ENTMCNC: 31.1 PG — HIGH (ref 27–31)
MCHC RBC-ENTMCNC: 34.9 G/DL — SIGNIFICANT CHANGE UP (ref 32–37)
MCV RBC AUTO: 89 FL — SIGNIFICANT CHANGE UP (ref 81–99)
MONOCYTES # BLD AUTO: 0.99 K/UL — HIGH (ref 0.1–0.6)
MONOCYTES NFR BLD AUTO: 10 % — HIGH (ref 1.7–9.3)
NEUTROPHILS # BLD AUTO: 5.56 K/UL — SIGNIFICANT CHANGE UP (ref 1.4–6.5)
NEUTROPHILS NFR BLD AUTO: 56.5 % — SIGNIFICANT CHANGE UP (ref 42.2–75.2)
NRBC # BLD: 0 /100 WBCS — SIGNIFICANT CHANGE UP (ref 0–0)
PLATELET # BLD AUTO: 210 K/UL — SIGNIFICANT CHANGE UP (ref 130–400)
PMV BLD: 10.7 FL — HIGH (ref 7.4–10.4)
POTASSIUM SERPL-MCNC: 4.3 MMOL/L — SIGNIFICANT CHANGE UP (ref 3.5–5)
POTASSIUM SERPL-SCNC: 4.3 MMOL/L — SIGNIFICANT CHANGE UP (ref 3.5–5)
PROT SERPL-MCNC: 7.4 G/DL — SIGNIFICANT CHANGE UP (ref 6–8)
RBC # BLD: 4.28 M/UL — SIGNIFICANT CHANGE UP (ref 4.2–5.4)
RBC # FLD: 14.2 % — SIGNIFICANT CHANGE UP (ref 11.5–14.5)
SODIUM SERPL-SCNC: 136 MMOL/L — SIGNIFICANT CHANGE UP (ref 135–146)
TROPONIN T, HIGH SENSITIVITY RESULT: <6 NG/L — SIGNIFICANT CHANGE UP (ref 6–13)
WBC # BLD: 9.86 K/UL — SIGNIFICANT CHANGE UP (ref 4.8–10.8)
WBC # FLD AUTO: 9.86 K/UL — SIGNIFICANT CHANGE UP (ref 4.8–10.8)

## 2024-02-19 PROCEDURE — 71045 X-RAY EXAM CHEST 1 VIEW: CPT | Mod: 26

## 2024-02-19 PROCEDURE — 99285 EMERGENCY DEPT VISIT HI MDM: CPT | Mod: FS

## 2024-02-19 NOTE — ED ADULT NURSE NOTE - NSFALLUNIVINTERV_ED_ALL_ED
Bed/Stretcher in lowest position, wheels locked, appropriate side rails in place/Call bell, personal items and telephone in reach/Instruct patient to call for assistance before getting out of bed/chair/stretcher/Non-slip footwear applied when patient is off stretcher/[place] to call system/Physically safe environment - no spills, clutter or unnecessary equipment/Purposeful proactive rounding/Room/bathroom lighting operational, light cord in reach

## 2024-02-19 NOTE — ED ADULT TRIAGE NOTE - WEIGHT IN KG
93.4 Airway patent, Nasal mucosa clear. Mouth with normal mucosa. Throat has no vesicles, no oropharyngeal exudates and uvula is midline.

## 2024-02-19 NOTE — ED ADULT NURSE NOTE - NEURO BEHAVIOR
OVERNIGHT EVENTS: events noted, spiking T, %, ? stroke code called, head CT reviewed    Vital Signs Last 24 Hrs  T(C): 37 (26 Dec 2020 01:31), Max: 38.8 (25 Dec 2020 20:31)  T(F): 98.6 (26 Dec 2020 01:31), Max: 101.8 (25 Dec 2020 20:31)  HR: 103 (26 Dec 2020 01:31) (85 - 136)  BP: 114/53 (26 Dec 2020 00:23) (107/65 - 163/69)  RR: 18 (26 Dec 2020 00:23) (18 - 18)  SpO2: 95% (26 Dec 2020 01:31) (93% - 100%)    PHYSICAL EXAMINATION:    GENERAL: ill looking, tachypneic    HEENT: Head is normocephalic and atraumatic.     NECK: Supple.    LUNGS: bl crackles    HEART: MARCELINO 3/6    ABDOMEN: Soft, nontender    EXTREMITIES: Without any cyanosis, clubbing, rash, lesions or edema.    NEUROLOGIC: non focal    SKIN: No ulceration or induration present.      LABS:                        9.6    5.36  )-----------( 131      ( 26 Dec 2020 06:22 )             31.9     12-25    139  |  109  |  19  ----------------------------<  117<H>  4.7   |  21  |  0.9    Ca    8.2<L>      25 Dec 2020 06:34  Mg     2.0     12-25    TPro  5.1<L>  /  Alb  2.9<L>  /  TBili  0.9  /  DBili  x   /  AST  30  /  ALT  16  /  AlkPhos  84  12-25          CARDIAC MARKERS ( 25 Dec 2020 06:34 )  x     / 0.02 ng/mL / x     / x     / x                Procalcitonin, Serum: 1.09 ng/mL (12-25-20 @ 06:34)  Procalcitonin, Serum: 0.31 ng/mL (12-24-20 @ 22:52)        12-25-20 @ 07:01  -  12-26-20 @ 07:00  --------------------------------------------------------  IN: 600 mL / OUT: 650 mL / NET: -50 mL        MICROBIOLOGY:  Culture Results:   <10,000 CFU/mL Normal Urogenital Beronica (12-23 @ 00:07)  Culture Results:   No growth to date. (12-22 @ 21:53)  Culture Results:   Growth in anaerobic bottle: Staphylococcus capitis  Coag Negative Staphylococcus  Single set isolate, possible contaminant. Contact  Microbiology if susceptibility testing clinically  indicated.  ***Blood Panel PCR results on this specimen are available  approximately 3 hours after the Gram stain result.***  Gram stain, PCR, and/or culture results may not always  correspond due to difference in methodologies.  ************************************************************  This PCR assay was performed using [employer].  The following targets are tested for: Enterococcus,  vancomycin resistant enterococci, Listeria monocytogenes,  coagulase negative staphylococci, S. aureus,  methicillin resistant S. aureus, Streptococcus agalactiae  (Group B), S. pneumoniae, S. pyogenes (Group A),  Acinetobacter baumannii, Enterobacter cloacae, E. coli,  Klebsiella oxytoca, K. pneumoniae, Proteus sp.,  Serratia marcescens, Haemophilus influenzae,  Neisseria meningitidis, Pseudomonas aeruginosa, Candida  albicans, C. glabrata, C krusei, C parapsilosis,  C. tropicalis and the KPC resistance gene.  "Due to technical problems, Proteus sp. and Pseudomonas  aeruginosa will Not be reported as part of the BCID panel  until further notice". (12-22 @ 21:53)      MEDICATIONS  (STANDING):  aspirin  chewable 81 milliGRAM(s) Oral daily  atorvastatin 20 milliGRAM(s) Oral at bedtime  budesonide 160 MICROgram(s)/formoterol 4.5 MICROgram(s) Inhaler 2 Puff(s) Inhalation two times a day  cefepime   IVPB 1000 milliGRAM(s) IV Intermittent every 12 hours  clopidogrel Tablet 75 milliGRAM(s) Oral daily  dabigatran 150 milliGRAM(s) Oral every 12 hours  digoxin     Tablet 0.25 milliGRAM(s) Oral daily  gabapentin 300 milliGRAM(s) Oral three times a day  lamoTRIgine 100 milliGRAM(s) Oral daily  metoprolol tartrate 50 milliGRAM(s) Oral two times a day  montelukast 10 milliGRAM(s) Oral daily  multivitamin 1 Tablet(s) Oral daily  pantoprazole    Tablet 40 milliGRAM(s) Oral before breakfast  QUEtiapine 200 milliGRAM(s) Oral at bedtime  sodium chloride 0.9%. 1000 milliLiter(s) (75 mL/Hr) IV Continuous <Continuous>  sodium chloride 0.9%. 1000 milliLiter(s) (50 mL/Hr) IV Continuous <Continuous>  tiotropium 18 MICROgram(s) Capsule 1 Capsule(s) Inhalation daily  vancomycin  IVPB 1000 milliGRAM(s) IV Intermittent every 24 hours    MEDICATIONS  (PRN):  acetaminophen   Tablet .. 650 milliGRAM(s) Oral every 6 hours PRN Mild Pain (1 - 3)  ALBUTerol    90 MICROgram(s) HFA Inhaler 1 Puff(s) Inhalation every 4 hours PRN Shortness of Breath  aluminum hydroxide/magnesium hydroxide/simethicone Suspension 30 milliLiter(s) Oral every 4 hours PRN Dyspepsia  guaifenesin/dextromethorphan  Syrup 10 milliLiter(s) Oral every 6 hours PRN Cough  LORazepam     Tablet 0.5 milliGRAM(s) Oral two times a day PRN Anxiety      RADIOLOGY & ADDITIONAL STUDIES:     calm

## 2024-02-20 ENCOUNTER — RESULT REVIEW (OUTPATIENT)
Age: 74
End: 2024-02-20

## 2024-02-20 ENCOUNTER — TRANSCRIPTION ENCOUNTER (OUTPATIENT)
Age: 74
End: 2024-02-20

## 2024-02-20 VITALS
TEMPERATURE: 98 F | DIASTOLIC BLOOD PRESSURE: 62 MMHG | SYSTOLIC BLOOD PRESSURE: 119 MMHG | RESPIRATION RATE: 18 BRPM | OXYGEN SATURATION: 99 % | HEART RATE: 81 BPM

## 2024-02-20 DIAGNOSIS — R00.2 PALPITATIONS: ICD-10-CM

## 2024-02-20 LAB
ALBUMIN SERPL ELPH-MCNC: 4.4 G/DL — SIGNIFICANT CHANGE UP (ref 3.5–5.2)
ALP SERPL-CCNC: 123 U/L — HIGH (ref 30–115)
ALT FLD-CCNC: 18 U/L — SIGNIFICANT CHANGE UP (ref 0–41)
ANION GAP SERPL CALC-SCNC: 8 MMOL/L — SIGNIFICANT CHANGE UP (ref 7–14)
APTT BLD: 36.4 SEC — SIGNIFICANT CHANGE UP (ref 27–39.2)
AST SERPL-CCNC: 19 U/L — SIGNIFICANT CHANGE UP (ref 0–41)
BILIRUB SERPL-MCNC: 0.3 MG/DL — SIGNIFICANT CHANGE UP (ref 0.2–1.2)
BUN SERPL-MCNC: 15 MG/DL — SIGNIFICANT CHANGE UP (ref 10–20)
CALCIUM SERPL-MCNC: 9.7 MG/DL — SIGNIFICANT CHANGE UP (ref 8.4–10.5)
CHLORIDE SERPL-SCNC: 99 MMOL/L — SIGNIFICANT CHANGE UP (ref 98–110)
CK MB CFR SERPL CALC: 2 NG/ML — SIGNIFICANT CHANGE UP (ref 0.6–6.3)
CK SERPL-CCNC: 67 U/L — SIGNIFICANT CHANGE UP (ref 0–225)
CO2 SERPL-SCNC: 32 MMOL/L — SIGNIFICANT CHANGE UP (ref 17–32)
CREAT SERPL-MCNC: 0.7 MG/DL — SIGNIFICANT CHANGE UP (ref 0.7–1.5)
D DIMER BLD IA.RAPID-MCNC: 213 NG/ML DDU — SIGNIFICANT CHANGE UP
EGFR: 91 ML/MIN/1.73M2 — SIGNIFICANT CHANGE UP
GLUCOSE SERPL-MCNC: 108 MG/DL — HIGH (ref 70–99)
HCT VFR BLD CALC: 38.1 % — SIGNIFICANT CHANGE UP (ref 37–47)
HGB BLD-MCNC: 13.3 G/DL — SIGNIFICANT CHANGE UP (ref 12–16)
INR BLD: 1.03 RATIO — SIGNIFICANT CHANGE UP (ref 0.65–1.3)
MCHC RBC-ENTMCNC: 30.8 PG — SIGNIFICANT CHANGE UP (ref 27–31)
MCHC RBC-ENTMCNC: 34.9 G/DL — SIGNIFICANT CHANGE UP (ref 32–37)
MCV RBC AUTO: 88.2 FL — SIGNIFICANT CHANGE UP (ref 81–99)
NRBC # BLD: 0 /100 WBCS — SIGNIFICANT CHANGE UP (ref 0–0)
PLATELET # BLD AUTO: 213 K/UL — SIGNIFICANT CHANGE UP (ref 130–400)
PMV BLD: 9.9 FL — SIGNIFICANT CHANGE UP (ref 7.4–10.4)
POTASSIUM SERPL-MCNC: 3.8 MMOL/L — SIGNIFICANT CHANGE UP (ref 3.5–5)
POTASSIUM SERPL-SCNC: 3.8 MMOL/L — SIGNIFICANT CHANGE UP (ref 3.5–5)
PROT SERPL-MCNC: 7.1 G/DL — SIGNIFICANT CHANGE UP (ref 6–8)
PROTHROM AB SERPL-ACNC: 11.8 SEC — SIGNIFICANT CHANGE UP (ref 9.95–12.87)
RBC # BLD: 4.32 M/UL — SIGNIFICANT CHANGE UP (ref 4.2–5.4)
RBC # FLD: 14 % — SIGNIFICANT CHANGE UP (ref 11.5–14.5)
SODIUM SERPL-SCNC: 139 MMOL/L — SIGNIFICANT CHANGE UP (ref 135–146)
TROPONIN T, HIGH SENSITIVITY RESULT: 6 NG/L — SIGNIFICANT CHANGE UP (ref 6–13)
TROPONIN T, HIGH SENSITIVITY RESULT: 6 NG/L — SIGNIFICANT CHANGE UP (ref 6–13)
TSH SERPL-MCNC: 2.21 UIU/ML — SIGNIFICANT CHANGE UP (ref 0.27–4.2)
WBC # BLD: 6.48 K/UL — SIGNIFICANT CHANGE UP (ref 4.8–10.8)
WBC # FLD AUTO: 6.48 K/UL — SIGNIFICANT CHANGE UP (ref 4.8–10.8)

## 2024-02-20 PROCEDURE — 85379 FIBRIN DEGRADATION QUANT: CPT

## 2024-02-20 PROCEDURE — 93005 ELECTROCARDIOGRAM TRACING: CPT

## 2024-02-20 PROCEDURE — 93306 TTE W/DOPPLER COMPLETE: CPT | Mod: 26

## 2024-02-20 PROCEDURE — 84443 ASSAY THYROID STIM HORMONE: CPT

## 2024-02-20 PROCEDURE — 93010 ELECTROCARDIOGRAM REPORT: CPT | Mod: 76

## 2024-02-20 PROCEDURE — 82553 CREATINE MB FRACTION: CPT

## 2024-02-20 PROCEDURE — 93306 TTE W/DOPPLER COMPLETE: CPT

## 2024-02-20 PROCEDURE — 85610 PROTHROMBIN TIME: CPT

## 2024-02-20 PROCEDURE — 85027 COMPLETE CBC AUTOMATED: CPT

## 2024-02-20 PROCEDURE — 99233 SBSQ HOSP IP/OBS HIGH 50: CPT

## 2024-02-20 PROCEDURE — 82550 ASSAY OF CK (CPK): CPT

## 2024-02-20 PROCEDURE — 85730 THROMBOPLASTIN TIME PARTIAL: CPT

## 2024-02-20 PROCEDURE — 84484 ASSAY OF TROPONIN QUANT: CPT

## 2024-02-20 PROCEDURE — 99239 HOSP IP/OBS DSCHRG MGMT >30: CPT

## 2024-02-20 PROCEDURE — 80053 COMPREHEN METABOLIC PANEL: CPT

## 2024-02-20 PROCEDURE — 36415 COLL VENOUS BLD VENIPUNCTURE: CPT

## 2024-02-20 RX ORDER — HEPARIN SODIUM 5000 [USP'U]/ML
5000 INJECTION INTRAVENOUS; SUBCUTANEOUS EVERY 8 HOURS
Refills: 0 | Status: DISCONTINUED | OUTPATIENT
Start: 2024-02-20 | End: 2024-02-20

## 2024-02-20 RX ORDER — ACETAMINOPHEN 500 MG
975 TABLET ORAL ONCE
Refills: 0 | Status: COMPLETED | OUTPATIENT
Start: 2024-02-20 | End: 2024-02-20

## 2024-02-20 RX ORDER — LEVOTHYROXINE SODIUM 125 MCG
137 TABLET ORAL
Refills: 0 | Status: DISCONTINUED | OUTPATIENT
Start: 2024-02-20 | End: 2024-02-20

## 2024-02-20 RX ORDER — ALPRAZOLAM 0.25 MG
1 TABLET ORAL
Refills: 0 | DISCHARGE

## 2024-02-20 RX ORDER — DILTIAZEM HCL 120 MG
180 CAPSULE, EXT RELEASE 24 HR ORAL
Refills: 0 | Status: DISCONTINUED | OUTPATIENT
Start: 2024-02-20 | End: 2024-02-20

## 2024-02-20 RX ORDER — POLYETHYLENE GLYCOL 3350 17 G/17G
17 POWDER, FOR SOLUTION ORAL DAILY
Refills: 0 | Status: DISCONTINUED | OUTPATIENT
Start: 2024-02-20 | End: 2024-02-20

## 2024-02-20 RX ORDER — ACETAMINOPHEN 500 MG
650 TABLET ORAL EVERY 6 HOURS
Refills: 0 | Status: DISCONTINUED | OUTPATIENT
Start: 2024-02-20 | End: 2024-02-20

## 2024-02-20 RX ORDER — LEVOTHYROXINE SODIUM 125 MCG
1 TABLET ORAL
Qty: 0 | Refills: 0 | DISCHARGE

## 2024-02-20 RX ORDER — PANTOPRAZOLE SODIUM 20 MG/1
40 TABLET, DELAYED RELEASE ORAL
Refills: 0 | Status: DISCONTINUED | OUTPATIENT
Start: 2024-02-20 | End: 2024-02-20

## 2024-02-20 RX ORDER — ESOMEPRAZOLE MAGNESIUM 40 MG/1
0 CAPSULE, DELAYED RELEASE ORAL
Qty: 0 | Refills: 0 | DISCHARGE

## 2024-02-20 RX ORDER — HYDROCHLOROTHIAZIDE 25 MG
1 TABLET ORAL
Qty: 0 | Refills: 0 | DISCHARGE

## 2024-02-20 RX ORDER — ALPRAZOLAM 0.25 MG
1 TABLET ORAL
Qty: 0 | Refills: 0 | DISCHARGE

## 2024-02-20 RX ORDER — BUTALBITAL
0 POWDER (GRAM) MISCELLANEOUS
Qty: 0 | Refills: 0 | DISCHARGE

## 2024-02-20 RX ORDER — ALPRAZOLAM 0.25 MG
0.5 TABLET ORAL AT BEDTIME
Refills: 0 | Status: DISCONTINUED | OUTPATIENT
Start: 2024-02-20 | End: 2024-02-20

## 2024-02-20 RX ORDER — CHOLECALCIFEROL (VITAMIN D3) 125 MCG
2 CAPSULE ORAL
Qty: 0 | Refills: 0 | DISCHARGE

## 2024-02-20 RX ORDER — DILTIAZEM HCL 120 MG
1 CAPSULE, EXT RELEASE 24 HR ORAL
Qty: 0 | Refills: 0 | DISCHARGE

## 2024-02-20 RX ADMIN — PANTOPRAZOLE SODIUM 40 MILLIGRAM(S): 20 TABLET, DELAYED RELEASE ORAL at 08:11

## 2024-02-20 RX ADMIN — Medication 137 MICROGRAM(S): at 06:42

## 2024-02-20 RX ADMIN — Medication 180 MILLIGRAM(S): at 06:42

## 2024-02-20 RX ADMIN — Medication 975 MILLIGRAM(S): at 02:42

## 2024-02-20 NOTE — CHART NOTE - NSCHARTNOTEFT_GEN_A_CORE
ERNESTO ([employer]) applied on 2/20/24 for 30-day outpatient monitoring for afib    f/u with Dr Merino

## 2024-02-20 NOTE — H&P ADULT - NSHPLABSRESULTS_GEN_ALL_CORE
13.3   9.86  )-----------( 210      ( 19 Feb 2024 22:50 )             38.1     02-19    136  |  97<L>  |  21<H>  ----------------------------<  111<H>  4.3   |  30  |  0.9    Ca    9.8      19 Feb 2024 22:50  Mg     2.1     02-19    TPro  7.4  /  Alb  4.4  /  TBili  <0.2  /  DBili  x   /  AST  36  /  ALT  21  /  AlkPhos  122<H>  02-19          Urinalysis Basic - ( 19 Feb 2024 22:50 )    Color: x / Appearance: x / SG: x / pH: x  Gluc: 111 mg/dL / Ketone: x  / Bili: x / Urobili: x   Blood: x / Protein: x / Nitrite: x   Leuk Esterase: x / RBC: x / WBC x   Sq Epi: x / Non Sq Epi: x / Bacteria: x        Lactate Trend        CAPILLARY BLOOD GLUCOSE 13.3   9.86  )-----------( 210      ( 19 Feb 2024 22:50 )             38.1     02-19    136  |  97<L>  |  21<H>  ----------------------------<  111<H>  4.3   |  30  |  0.9    Ca    9.8      19 Feb 2024 22:50  Mg     2.1     02-19    TPro  7.4  /  Alb  4.4  /  TBili  <0.2  /  DBili  x   /  AST  36  /  ALT  21  /  AlkPhos  122<H>  02-19          Urinalysis Basic - ( 19 Feb 2024 22:50 )    Color: x / Appearance: x / SG: x / pH: x  Gluc: 111 mg/dL / Ketone: x  / Bili: x / Urobili: x   Blood: x / Protein: x / Nitrite: x   Leuk Esterase: x / RBC: x / WBC x   Sq Epi: x / Non Sq Epi: x / Bacteria: x    Lactate Trend    CAPILLARY BLOOD GLUCOSE    EKG (per ER) - slight depressions in inferior leads 13.3   9.86  )-----------( 210      ( 19 Feb 2024 22:50 )             38.1     02-19    136  |  97<L>  |  21<H>  ----------------------------<  111<H>  4.3   |  30  |  0.9    Ca    9.8      19 Feb 2024 22:50  Mg     2.1     02-19    TPro  7.4  /  Alb  4.4  /  TBili  <0.2  /  DBili  x   /  AST  36  /  ALT  21  /  AlkPhos  122<H>  02-19          Urinalysis Basic - ( 19 Feb 2024 22:50 )    Color: x / Appearance: x / SG: x / pH: x  Gluc: 111 mg/dL / Ketone: x  / Bili: x / Urobili: x   Blood: x / Protein: x / Nitrite: x   Leuk Esterase: x / RBC: x / WBC x   Sq Epi: x / Non Sq Epi: x / Bacteria: x    Lactate Trend    CAPILLARY BLOOD GLUCOSE    EKG (per ER) - sinus, PAC's, ST + T wave abnormality

## 2024-02-20 NOTE — DISCHARGE NOTE PROVIDER - CARE PROVIDER_API CALL
Gilberto Merino  Cardiology  [address] [place] [id number]  Phone: [phone]  Fax: [phone]  Established Patient  Follow Up Time: 1 month

## 2024-02-20 NOTE — ED PROVIDER NOTE - ATTENDING APP SHARED VISIT CONTRIBUTION OF CARE
I have personally performed a history and physical exam on this patient and personally directed the management of the patient. Patient is a 73-year-old female past medical history of atrial tachycardia presents for evaluation of palpitations concern for worsening A-fib over the past week worsening over the past hours however she denies any chest pain shortness of breath abdominal pain back pain fevers chills vomiting diaphoresis    On physical exam overall the patient is well-appearing normocephalic atraumatic pupils equally round reactive light accommodation extraocular muscles intact oropharynx clear chest clear to auscultation bilaterally abdomen soft nontender nondistended bowel sounds positive no guarding or rebound extremities full range of motion no focal deficits noted pedal pulse 2+ radial pulse 2+ no skin changes noted    Assessment plan given her history and complaint we obtained EKG per my independent evaluation not consistent with STEMI patient does have slight depressions in the inferior leads not consistent with atrial tachycardia not consistent with atrial fibrillation this is slightly different from an EKG performed and compared July 12, 2023    In addition we obtained a chest x-ray per my independent evaluation not consistent with pneumonia or pneumothorax    We obtained troponin which at this time is less than 6 no other large electrolyte abnormalities noted no large elevation of white blood cell count given her EKG findings chief complaint past medical history I will admit for further evaluation as the patient is symptomatic at this time

## 2024-02-20 NOTE — DISCHARGE NOTE NURSING/CASE MANAGEMENT/SOCIAL WORK - PATIENT PORTAL LINK FT
You can access the FollowMyHealth Patient Portal offered by [facility] by registering at the following website: [email or web address]. By joining [employer]’s FollowMyHealth portal, you will also be able to view your health information using other applications (apps) compatible with our system.

## 2024-02-20 NOTE — CONSULT NOTE ADULT - SUBJECTIVE AND OBJECTIVE BOX
HPI:  74yo female whose PMH includes provoked pulmonary embolism (off Eliquis for 2 years)  and atrial tachycardia (per ER note), presents to the ER due to palpitations. Notes that her daughter is very ill. Work up revealed abnormal EKG but no arrythmia to explain palpitations   (2024 05:54)        Cardiology Update: 72 y/o f pmh PE off Eliquis for 2 yrs, Atrial tachycardia on Cardizem, HTN, HLD, ETHAN on CPAP presents for palpitation and abnormal rythm on her home device concerning for Afib. She denies associated chest pain, sob, dizziness, diaphoresis, recent illness/sick contacts. She admits being under a lot of stress lately as her daughter is very ill.    Cards: Dr mcgregor            PAST MEDICAL & SURGICAL HISTORY  Hypertension    Hypothyroid    Gastroesophageal reflux disease    Cataract    Migraines    ETHAN on CPAP    History of palpitations    Herniated disc, cervical    H/O: rheumatic fever    Mitral valve prolapse    Pulmonary embolism    History of surgery on arm    History of cholecystectomy    Uterine polyp    History of ear surgery    H/O:     H/O knee surgery  torn minsicus repair b/l knee    Cataract extraction status of eye, left        FAMILY HISTORY:  FAMILY HISTORY:  Family history of breast cancer in mother (Mother)    Family history of CKD (chronic kidney disease) (Father)        SOCIAL HISTORY:  []smoker  []Alcohol  []Drug    ALLERGIES:  codeine (Vomiting; Nausea)  predniSONE (Other; Rash)  adhesives (Rash)  cortisone (Other; Rash; Pruritus)  penicillin ([place]; Rash; Pruritus)  morphine (Vomiting)  IV Contrast (Swelling)  Levaquin (Unknown)  latex (Pruritus; Rash)  lidocaine topical (Rash)  fentanyl (Vomiting)  Dilaudid (Vomiting)      MEDICATIONS:  MEDICATIONS  (STANDING):  diltiazem    milliGRAM(s) Oral <User Schedule>  heparin   Injectable 5000 Unit(s) SubCutaneous every 8 hours  hydrochlorothiazide 25 milliGRAM(s) Oral <User Schedule>  levothyroxine 137 MICROGram(s) Oral <User Schedule>  pantoprazole    Tablet 40 milliGRAM(s) Oral before breakfast    MEDICATIONS  (PRN):  acetaminophen     Tablet .. 650 milliGRAM(s) Oral every 6 hours PRN Mild Pain (1 - 3), Moderate Pain (4 - 6)  ALPRAZolam 0.5 milliGRAM(s) Oral at bedtime PRN for anxiety  polyethylene glycol 3350 17 Gram(s) Oral daily PRN Constipation      HOME MEDICATIONS:  Home Medications:  DilTIAZem (Eqv-Cardizem CD) 180 mg/24 hours oral capsule, extended release: 1 cap(s) orally once a day (2024 05:54)  Fioricet oral tablet: 1 tab(s) orally as needed for pain (2024 05:52)  freetext medication     -: 1 tab(s) orally every 6 hours, As needed, FOR HEADACHE (2024 05:54)  hydroCHLOROthiazide 25 mg oral tablet: 1 orally (2024 05:54)  NexIUM:  (2024 05:54)  polyethylene glycol 3350 oral powder for reconstitution: 17 gram(s) orally once a day, As Needed (2024 05:54)  Synthroid 137 mcg (0.137 mg) oral tablet: 1 tab(s) orally once a day (2024 05:54)  Vitamin D3: 2 tab(s) orally once a day (2024 05:54)  Xanax 0.5 mg oral tablet: 1 tab(s) orally once a day (at bedtime) as needed for  anxiety (2024 05:53)      VITALS:   T(F): 97.8 ( @ 05:25), Max: 97.8 ( @ 05:25)  HR: 81 ( @ 05:25) (63 - 88)  BP: 119/62 ( @ 05:25) (119/62 - 145/68)  BP(mean): --  RR: 18 ( @ 05:25) (18 - 20)  SpO2: 99% ( @ 05:25) (97% - 100%)    I&O's Summary      REVIEW OF SYSTEMS:  CONSTITUTIONAL: No weakness, fevers or chills  EYES: No visual changes  ENT: No vertigo or throat pain   NECK: No pain or stiffness  RESPIRATORY: No cough, wheezing, hemoptysis; No shortness of breath  CARDIOVASCULAR: No chest pain or palpitations  GASTROINTESTINAL: No abdominal or epigastric pain. No nausea, vomiting, or hematemesis; No diarrhea or constipation. No melena or hematochezia.  GENITOURINARY: No dysuria, frequency or hematuria  NEUROLOGICAL: No numbness or weakness  SKIN: No itching, no rashes  MSK: no    PHYSICAL EXAM:  NEURO: patient is awake , alert and oriented  GEN: Not in acute distress  NECK: no thyroid enlargement, no JVD  LUNGS: Clear to auscultation bilaterally   CARDIOVASCULAR: S1/S2 present, RRR , no murmurs or rubs, no carotid bruits,  + PP bilaterally  ABD: Soft, non-tender, non-distended, +BS  EXT: No CHARLES  SKIN: Intact    LABS:                        13.3   6.48  )-----------( 213      ( 2024 08:31 )             38.1         139  |  99  |  15  ----------------------------<  108<H>  3.8   |  32  |  0.7    Ca    9.7      2024 08:31  Mg     2.1         TPro  7.1  /  Alb  4.4  /  TBili  0.3  /  DBili  x   /  AST  19  /  ALT  18  /  AlkPhos  123<H>      PT/INR - ( 2024 08:31 )   PT: 11.80 sec;   INR: 1.03 ratio         PTT - ( 2024 08:31 )  PTT:36.4 sec  Creatine Kinase, Serum: 67 U/L (24 @ 08:31)    CARDIAC MARKERS ( 2024 08:31 )  x     / x     / 67 U/L / x     / 2.0 ng/mL        Troponin trend:            RADIOLOGY:      ECG:    TELEMETRY EVENTS:

## 2024-02-20 NOTE — DISCHARGE NOTE PROVIDER - NSDCMRMEDTOKEN_GEN_ALL_CORE_FT
DilTIAZem (Eqv-Cardizem CD) 180 mg/24 hours oral capsule, extended release: 1 cap(s) orally once a day  Fioricet oral tablet: 1 tab(s) orally as needed for pain  hydroCHLOROthiazide 25 mg oral tablet: 1 tablet orally once a day  NexIUM:   polyethylene glycol 3350 oral powder for reconstitution: 17 gram(s) orally once a day, As Needed  Synthroid 137 mcg (0.137 mg) oral tablet: 1 tab(s) orally once a day  Vitamin D3: 2 tab(s) orally once a day  Xanax 0.5 mg oral tablet: 1 tab(s) orally once a day (at bedtime) as needed for  anxiety

## 2024-02-20 NOTE — DISCHARGE NOTE NURSING/CASE MANAGEMENT/SOCIAL WORK - NSDCPEFALRISK_GEN_ALL_CORE
For information on Fall & Injury Prevention, visit: [email or web address] OR  [email or web address] OR  https://www.cdc.gov/steadi/patient.html

## 2024-02-20 NOTE — ED PROVIDER NOTE - CLINICAL SUMMARY MEDICAL DECISION MAKING FREE TEXT BOX
given her history and complaint we obtained EKG per my independent evaluation not consistent with STEMI patient does have slight depressions in the inferior leads not consistent with atrial tachycardia not consistent with atrial fibrillation this is slightly different from an EKG performed and compared July 12, 2023    In addition we obtained a chest x-ray per my independent evaluation not consistent with pneumonia or pneumothorax    We obtained troponin which at this time is less than 6 no other large electrolyte abnormalities noted no large elevation of white blood cell count given her EKG findings chief complaint past medical history I will admit for further evaluation as the patient is symptomatic at this time

## 2024-02-20 NOTE — CONSULT NOTE ADULT - NS ATTEND AMEND GEN_ALL_CORE FT
Patient seen and examined. Pertinent labs, imaging and telemetry reviewed. I agree with the above:     Patient feeling better. Still with some fluttering. It had been worsening which is what brought her in.   Checked [employer] which said possible AF but with increased PAC/PVC.   -Pt with PACs on monitor.   -EKG unchanged. Trop negative.   -Only change is recently using cocoa powder in breakfast.   -Continue with diltiazem.   -Check TSH.   -Check TTE.   -If no abnormality, can DC from cardio perspective.   -MCOT 30 days on discharge.     Discussed with pt, son and ACP.

## 2024-02-20 NOTE — DISCHARGE NOTE PROVIDER - NSDCCPCAREPLAN_GEN_ALL_CORE_FT
PRINCIPAL DISCHARGE DIAGNOSIS  Diagnosis: Premature atrial beats  Assessment and Plan of Treatment:       SECONDARY DISCHARGE DIAGNOSES  Diagnosis: Abnormal EKG  Assessment and Plan of Treatment:      PRINCIPAL DISCHARGE DIAGNOSIS  Diagnosis: Premature atrial beats  Assessment and Plan of Treatment: MCOT placed and pt will follow up with cardiology, Dr. Gilberto Merino      SECONDARY DISCHARGE DIAGNOSES  Diagnosis: Abnormal EKG  Assessment and Plan of Treatment:

## 2024-02-20 NOTE — DISCHARGE NOTE PROVIDER - HOSPITAL COURSE
HPI:  72 y/o f pmh PE off Eliquis for 2 yrs, Atrial tachycardia on Cardizem, HTN, HLD, ETHAN on CPAP presents for palpitation and abnormal rhythm on her home device concerning for Afib  In the ED, HR was controlled. Troponin negative. EKG - Sinus with PACs.     TTE Shows:   1. Normal global left ventricular systolic function.   2. LV Ejection Fraction by Burks's Method with a biplane EF of 64 %.   3. Normal right ventricular size and function.   4. Normal left atrial size.   5. Normal right atrial size.   6. Mild to moderate aortic regurgitation.   7. Trace mitral valve regurgitation.   8. Borderline dilatation of the ascending aorta, measures 3.5 cm.   9. Normal pulmonary artery pressure.  10. There is no evidence of pericardial effusion.    She was seen by Dr. Merino of Cardiology. Will continue current medical therapy and DC with MCOT.

## 2024-02-20 NOTE — H&P ADULT - ASSESSMENT
Palpitations Palpitations, present in past (atrial tachycardia in pat?)  - telemetry, continue usual dose of Cardizem, cardiology consult, Consider ECHO (history of MVP too)     mild dehydration- may be contributing, monitor clinically     history of provoked pulmonary embolism - will check D-Dimers    hypothyroid - on  replacement, check TSH    some anxiety - xanax prn at night      GERD - PPI Palpitations, present in past (atrial tachycardia in pat?) / Abnormal EKG  - telemetry, continue usual dose of Cardizem, cardiology consult, Consider ECHO (history of MVP too)     mild dehydration- may be contributing, monitor clinically     history of provoked pulmonary embolism - will check D-Dimers    hypothyroid - on  replacement, check TSH    some anxiety - xanax prn at night      GERD - PPI

## 2024-02-20 NOTE — ED PROVIDER NOTE - PHYSICAL EXAMINATION
On physical exam overall the patient is well-appearing normocephalic atraumatic pupils equally round reactive light accommodation extraocular muscles intact oropharynx clear chest clear to auscultation bilaterally abdomen soft nontender nondistended bowel sounds positive no guarding or rebound extremities full range of motion no focal deficits noted pedal pulse 2+ radial pulse 2+ no skin changes noted

## 2024-02-20 NOTE — ED PROVIDER NOTE - OBJECTIVE STATEMENT
. Patient is a 73-year-old female past medical history of atrial tachycardia presents for evaluation of palpitations concern for worsening A-fib over the past week worsening over the past hours however she denies any chest pain shortness of breath abdominal pain back pain fevers chills vomiting diaphoresis

## 2024-02-20 NOTE — DISCHARGE NOTE PROVIDER - NSDCFUSCHEDAPPT_GEN_ALL_CORE_FT
John Estrada  [facility]  PULMMED [address]  Scheduled Appointment: 03/08/2024    Unknown, Doctor  [facility] PreAdmits  Scheduled Appointment: 03/13/2024    [facility]  DIAGRAD SI [address]  Scheduled Appointment: 03/13/2024    [facility]  BRSTIMAG SI [address]  Scheduled Appointment: 03/13/2024    Gilberto Merino  [facility]  CARDIOLOGY [address]  Scheduled Appointment: 04/17/2024

## 2024-02-20 NOTE — CONSULT NOTE ADULT - ASSESSMENT
72 y/o 72 y/o f pmh PE off Eliquis for 2 yrs, Atrial tachycardia on Cardizem, HTN, HLD, ETHAN on CPAP presents for palpitation and abnormal rythm on her home device concerning for Afib.    Plan  # Palpitation  - She feels better but still having occasional heart fluttering  - on tele appears nsr with PACs, No evidence of Afib  - c/w cardizem for rate control  - will consider 30-Day MCOT; need insurance pre authorization  - OP F/U with dr mcgregor

## 2024-02-20 NOTE — PATIENT PROFILE ADULT - FALL HARM RISK - UNIVERSAL INTERVENTIONS
Bed in lowest position, wheels locked, appropriate side rails in place/Call bell, personal items and telephone in reach/Instruct patient to call for assistance before getting out of bed or chair/Non-slip footwear when patient is out of bed/[place] to call system/Physically safe environment - no spills, clutter or unnecessary equipment/Purposeful Proactive Rounding/Room/bathroom lighting operational, light cord in reach

## 2024-02-20 NOTE — H&P ADULT - HISTORY OF PRESENT ILLNESS
74yo female whose PMH includes provoked pulmonary embolism (off Eliquis for 2 years)  and atrial tachycardia, presents to the ER due to palpitations. Notes that her daughter is very ill. Work up revealed abnormal EKG but no arrythmia to explain palpitations   72yo female whose PMH includes provoked pulmonary embolism (off Eliquis for 2 years)  and atrial tachycardia (per ER note), presents to the ER due to palpitations. Notes that her daughter is very ill. Work up revealed abnormal EKG but no arrythmia to explain palpitations

## 2024-02-21 NOTE — PATIENT PROFILE ADULT - WILL THE PATIENT ACCEPT THE PFIZER COVID-19 VACCINE IF ELIGIBLE AND IT IS AVAILABLE?
[Normal Growth] : growth [Normal Development] : development  [No Elimination Concerns] : elimination [Continue Regimen] : feeding [No Skin Concerns] : skin [Normal Sleep Pattern] : sleep [None] : no medical problems [Anticipatory Guidance Given] : Anticipatory guidance addressed as per the history of present illness section [Physical Growth and Development] : physical growth and development [Social and Academic Competence] : social and academic competence [Emotional Well-Being] : emotional well-being [Risk Reduction] : risk reduction [Violence and Injury Prevention] : violence and injury prevention [No Vaccines] : no vaccines needed [No Medications] : ~He/She~ is not on any medications [Patient] : patient [Parent/Guardian] : Parent/Guardian [Full Activity without restrictions including Physical Education & Athletics] : Full Activity without restrictions including Physical Education & Athletics [FreeTextEntry1] : Well 13-14 year old Discussed growth and development: normal Discussed safety/anticipatory guidance Discussed use of electronics/internet Discussed risk taking behaviors Reviewed immunization forecast and discussed need for any vaccines, reviewed side effects and VIS Next PE: 1 year DISCUSSED HPV VACCINE Not applicable

## 2024-02-27 DIAGNOSIS — E03.9 HYPOTHYROIDISM, UNSPECIFIED: ICD-10-CM

## 2024-02-27 DIAGNOSIS — F41.9 ANXIETY DISORDER, UNSPECIFIED: ICD-10-CM

## 2024-02-27 DIAGNOSIS — Z91.048 OTHER NONMEDICINAL SUBSTANCE ALLERGY STATUS: ICD-10-CM

## 2024-02-27 DIAGNOSIS — I10 ESSENTIAL (PRIMARY) HYPERTENSION: ICD-10-CM

## 2024-02-27 DIAGNOSIS — Z79.890 HORMONE REPLACEMENT THERAPY: ICD-10-CM

## 2024-02-27 DIAGNOSIS — Z79.899 OTHER LONG TERM (CURRENT) DRUG THERAPY: ICD-10-CM

## 2024-02-27 DIAGNOSIS — G47.33 OBSTRUCTIVE SLEEP APNEA (ADULT) (PEDIATRIC): ICD-10-CM

## 2024-02-27 DIAGNOSIS — Z88.0 ALLERGY STATUS TO PENICILLIN: ICD-10-CM

## 2024-02-27 DIAGNOSIS — I49.1 ATRIAL PREMATURE DEPOLARIZATION: ICD-10-CM

## 2024-02-27 DIAGNOSIS — Z91.040 LATEX ALLERGY STATUS: ICD-10-CM

## 2024-02-27 DIAGNOSIS — Z90.49 ACQUIRED ABSENCE OF OTHER SPECIFIED PARTS OF DIGESTIVE TRACT: ICD-10-CM

## 2024-02-27 DIAGNOSIS — Z86.711 PERSONAL HISTORY OF PULMONARY EMBOLISM: ICD-10-CM

## 2024-02-27 DIAGNOSIS — Z86.19 PERSONAL HISTORY OF OTHER INFECTIOUS AND PARASITIC DISEASES: ICD-10-CM

## 2024-02-27 DIAGNOSIS — K21.9 GASTRO-ESOPHAGEAL REFLUX DISEASE WITHOUT ESOPHAGITIS: ICD-10-CM

## 2024-02-27 DIAGNOSIS — Z88.8 ALLERGY STATUS TO OTHER DRUGS, MEDICAMENTS AND BIOLOGICAL SUBSTANCES: ICD-10-CM

## 2024-02-27 DIAGNOSIS — E86.0 DEHYDRATION: ICD-10-CM

## 2024-02-27 DIAGNOSIS — Z91.041 RADIOGRAPHIC DYE ALLERGY STATUS: ICD-10-CM

## 2024-02-27 DIAGNOSIS — Z88.4 ALLERGY STATUS TO ANESTHETIC AGENT: ICD-10-CM

## 2024-02-27 DIAGNOSIS — Z88.5 ALLERGY STATUS TO NARCOTIC AGENT: ICD-10-CM

## 2024-03-08 ENCOUNTER — APPOINTMENT (OUTPATIENT)
Dept: PULMONOLOGY | Facility: CLINIC | Age: 74
End: 2024-03-08
Payer: MEDICARE

## 2024-03-08 VITALS
BODY MASS INDEX: 30.51 KG/M2 | RESPIRATION RATE: 14 BRPM | SYSTOLIC BLOOD PRESSURE: 132 MMHG | HEART RATE: 81 BPM | HEIGHT: 69 IN | WEIGHT: 206 LBS | OXYGEN SATURATION: 99 % | DIASTOLIC BLOOD PRESSURE: 82 MMHG

## 2024-03-08 DIAGNOSIS — G47.33 OBSTRUCTIVE SLEEP APNEA (ADULT) (PEDIATRIC): ICD-10-CM

## 2024-03-08 PROCEDURE — 99214 OFFICE O/P EST MOD 30 MIN: CPT

## 2024-03-08 NOTE — ASSESSMENT
[FreeTextEntry1] : Severe ETHAN on APAP  Intermittent asthma well controlled  HO VTE after shoulder surgery, now off AC

## 2024-03-08 NOTE — HISTORY OF PRESENT ILLNESS
[Intermittent] : intermittent [Doing Well] : doing well [Well Controlled] : Well controlled [Checks Regularly] : The patient checks ~his/her~ peak flow regularly [Good Control] : peak flow has been good [Adherent] : the patient is adherent with ~his/her~ medication regimen [Goals--Doing Well] : the patient is doing well with ~his/her~ asthma goals [Follow-Up - Routine Clinic] : a routine clinic follow-up of [None] : No associated symptoms are reported [Good Compliance] : good compliance with treatment [Good Tolerance] : good tolerance of treatment [Good Symptom Control] : good symptom control [de-identified] : APAP

## 2024-03-13 ENCOUNTER — OUTPATIENT (OUTPATIENT)
Dept: OUTPATIENT SERVICES | Facility: HOSPITAL | Age: 74
LOS: 1 days | End: 2024-03-13
Payer: MEDICARE

## 2024-03-13 DIAGNOSIS — Z13.820 ENCOUNTER FOR SCREENING FOR OSTEOPOROSIS: ICD-10-CM

## 2024-03-13 DIAGNOSIS — Z98.891 HISTORY OF UTERINE SCAR FROM PREVIOUS SURGERY: Chronic | ICD-10-CM

## 2024-03-13 DIAGNOSIS — Z12.31 ENCOUNTER FOR SCREENING MAMMOGRAM FOR MALIGNANT NEOPLASM OF BREAST: ICD-10-CM

## 2024-03-13 DIAGNOSIS — Z98.42 CATARACT EXTRACTION STATUS, LEFT EYE: Chronic | ICD-10-CM

## 2024-03-13 DIAGNOSIS — Z98.890 OTHER SPECIFIED POSTPROCEDURAL STATES: Chronic | ICD-10-CM

## 2024-03-13 PROCEDURE — 77063 BREAST TOMOSYNTHESIS BI: CPT | Mod: 26

## 2024-03-13 PROCEDURE — 77067 SCR MAMMO BI INCL CAD: CPT

## 2024-03-13 PROCEDURE — 77067 SCR MAMMO BI INCL CAD: CPT | Mod: 26

## 2024-03-13 PROCEDURE — 77063 BREAST TOMOSYNTHESIS BI: CPT

## 2024-03-13 PROCEDURE — 77080 DXA BONE DENSITY AXIAL: CPT

## 2024-03-13 PROCEDURE — 77080 DXA BONE DENSITY AXIAL: CPT | Mod: 26

## 2024-03-14 DIAGNOSIS — Z13.820 ENCOUNTER FOR SCREENING FOR OSTEOPOROSIS: ICD-10-CM

## 2024-03-14 DIAGNOSIS — Z12.31 ENCOUNTER FOR SCREENING MAMMOGRAM FOR MALIGNANT NEOPLASM OF BREAST: ICD-10-CM

## 2024-03-29 NOTE — PHYSICAL EXAM
[Well Developed] : well developed [Well Nourished] : well nourished [No Acute Distress] : no acute distress [Normal Conjunctiva] : normal conjunctiva [5th Left ICS - MCL] : palpated at the 5th LICS in the midclavicular line [Normal Venous Pressure] : normal venous pressure [Normal] : normal [No Precordial Heave] : no precordial heave was noted [Normal Rate] : normal [Rhythm Regular] : regular [Normal S1] : normal S1 [No Murmur] : no murmurs heard [Normal S2] : normal S2 [___ +] : bilateral [unfilled]U+ pitting edema to the ankles [2+] : right 2+ [Clear Lung Fields] : clear lung fields [Good Air Entry] : good air entry [No Respiratory Distress] : no respiratory distress  [Soft] : abdomen soft [Normal Bowel Sounds] : normal bowel sounds [Non Tender] : non-tender [Edema ___] : edema [unfilled] [No Varicosities] : no varicosities [Normal Gait] : normal gait [No Rash] : no rash [Moves all extremities] : moves all extremities [No Focal Deficits] : no focal deficits [Alert and Oriented] : alert and oriented

## 2024-03-29 NOTE — PHYSICAL EXAM
[Well Nourished] : well nourished [Well Developed] : well developed [No Acute Distress] : no acute distress [Normal Conjunctiva] : normal conjunctiva [Normal Venous Pressure] : normal venous pressure [5th Left ICS - MCL] : palpated at the 5th LICS in the midclavicular line [Normal Rate] : normal [Normal] : normal [No Precordial Heave] : no precordial heave was noted [Rhythm Regular] : regular [Normal S1] : normal S1 [Normal S2] : normal S2 [No Murmur] : no murmurs heard [___ +] : bilateral [unfilled]U+ pitting edema to the ankles [2+] : left 2+ [Good Air Entry] : good air entry [Clear Lung Fields] : clear lung fields [No Respiratory Distress] : no respiratory distress  [Soft] : abdomen soft [Normal Bowel Sounds] : normal bowel sounds [Non Tender] : non-tender [Edema ___] : edema [unfilled] [Normal Gait] : normal gait [No Varicosities] : no varicosities [Moves all extremities] : moves all extremities [No Rash] : no rash [No Focal Deficits] : no focal deficits [Alert and Oriented] : alert and oriented

## 2024-04-03 ENCOUNTER — APPOINTMENT (OUTPATIENT)
Dept: CARDIOLOGY | Facility: CLINIC | Age: 74
End: 2024-04-03
Payer: MEDICARE

## 2024-04-03 VITALS
WEIGHT: 210 LBS | HEART RATE: 78 BPM | DIASTOLIC BLOOD PRESSURE: 80 MMHG | BODY MASS INDEX: 31.1 KG/M2 | HEIGHT: 69 IN | SYSTOLIC BLOOD PRESSURE: 116 MMHG

## 2024-04-03 DIAGNOSIS — J45.909 UNSPECIFIED ASTHMA, UNCOMPLICATED: ICD-10-CM

## 2024-04-03 DIAGNOSIS — I73.9 PERIPHERAL VASCULAR DISEASE, UNSPECIFIED: ICD-10-CM

## 2024-04-03 PROCEDURE — 99214 OFFICE O/P EST MOD 30 MIN: CPT

## 2024-04-03 PROCEDURE — 93000 ELECTROCARDIOGRAM COMPLETE: CPT

## 2024-04-03 PROCEDURE — G2211 COMPLEX E/M VISIT ADD ON: CPT

## 2024-04-03 NOTE — REASON FOR VISIT
[Other: ____] : [unfilled] [FreeTextEntry1] : Diagnostic Tests: --------------------- EK24-NSR. NSST changes.  23-NSR. NSST changes.  23-NSR. NSST changes.  10/12/22-NSR. Nonspecific ST changes.  22-NSR. Minimal ST depressions.  21-NSR. Nonspecific ST changes.  --------------------- Echo: 24- TTE: EF 64%, Mild to moderate AR, AA 3.5 cm, trace MR 22-TTE: EF 69%. Mild AI, PI.  --------------------- Physio: 22-HESHAM/PVR: Normal HESHAM B/L. PVR nondiagnostic due to pain.  --------------------- US: 05/15/23-Carotid: <50% B/L ICA stenosis.  22-LE Arterial: B/L mild diffuse.  21-LE Venous: No DVT, superficial thrombophlebitis.  --------------------- CT: 21-CCTA:  (50-75%ile, LM and LAD). Motion artifact precludes accurate assessment.  ------------------ Holter monitor: -24- HR  bpm 69bpm average, PACs <2%, PVCs <1%, AVB 1 degree, SVTx1 116bpm 6 beats

## 2024-04-03 NOTE — HISTORY OF PRESENT ILLNESS
Spoke to patient informed patient of message below. Patient understood and thanked me for the call.     [FreeTextEntry1] : Ms. Staples is a 74yo F with PMHx of asthma, hypothyroid, ETHAN on CPAP, degenerative disease of cervical spine and PE who presents to [facility] care. Her PMD and former cardiologist is Dr. Claudio Ortiz. Patient developed PE post surgical procedure on her right shoulder at the end of 2019, was on Eliquis for 1 year.  Had vaginal polypectomy, rectal fistula repair within the last 3 years.  She complains of palpitations with HR up to 150 bpm, (on diltiazem 180 mg), occasional chest discomfort radiating to the left scapula. Reports "tiredness in her hips" with walking.  10/12/2022: Patient presents for cardiology F/U visit. CTA from 2021 ( done in the outside facility) showed elevated Ca score, calcification in the distribution of left main, no clear information due to multiple artifacts. Patient complains of occasional chest discomfort - dull ache on and off. Palpitations are controlled with Diltiazem.  -Patient was sent for CCTA but did not complete test due to refusing NTG. She is willing to try again without NTG.  04/18/23-Patient still with left shoulder pain. She will at times get pulling sensation under her breast. Also complains of some anterior neck pain. Willing to go for CCTA. She will get some trace edema in her ankles which subside with rest.  12/13/23-Still with chest pulling. Some dizziness which is happening mostly with quick movement. Similar to previous vertigo.  04/03/2024- Patient went to ER on 02/20/2024 with complains of palpitations and irregular heart beat, was found to be in SR with PACs. She was discharged the same day with Holter monitor, no changes were made in medical management. Palpitations are improved after stopping cocoa powder. Still some left sided CP.

## 2024-04-03 NOTE — HISTORY OF PRESENT ILLNESS
[FreeTextEntry1] : Ms. Staples is a 74yo F with PMHx of asthma, hypothyroid, ETHAN on CPAP, degenerative disease of cervical spine and PE who presents to [facility] care. Her PMD and former cardiologist is Dr. Claudio Ortiz. Patient developed PE post surgical procedure on her right shoulder at the end of 2019, was on Eliquis for 1 year.  Had vaginal polypectomy, rectal fistula repair within the last 3 years.  She complains of palpitations with HR up to 150 bpm, (on diltiazem 180 mg), occasional chest discomfort radiating to the left scapula. Reports "tiredness in her hips" with walking.  10/12/2022: Patient presents for cardiology F/U visit. CTA from 2021 ( done in the outside facility) showed elevated Ca score, calcification in the distribution of left main, no clear information due to multiple artifacts. Patient complains of occasional chest discomfort - dull ache on and off. Palpitations are controlled with Diltiazem.  -Patient was sent for CCTA but did not complete test due to refusing NTG. She is willing to try again without NTG.  04/18/23-Patient still with left shoulder pain. She will at times get pulling sensation under her breast. Also complains of some anterior neck pain. Willing to go for CCTA. She will get some trace edema in her ankles which subside with rest.  12/13/23-Still with chest pulling. Some dizziness which is happening mostly with quick movement. Similar to previous vertigo.  04/03/2024- Patient went to ER on 02/20/2024 with complains of palpitations and irregular heart beat, was found to be in SR with PACs. She was discharged the same day with Holter monitor, no changes were made in medical management. Palpitations are improved after stopping cocoa powder. Still some left sided CP.  Ureteral stent retained

## 2024-04-03 NOTE — ASSESSMENT
[FreeTextEntry1] : Dizziness: Likely due to BPPV and vertigo -Continue to follow with ENT.   Palpitations - will continue Diltiazem  mg PO daily.  - Diltiazem 30mg IR PRN for breakthrough palpitations.   Chest pain - CTA from 2021 showed elevated Ca score and calcification in the distribution of LM - TTE showed normal LV function - patient complains of occasional chest pain -If able to find facility who will perform CCTA without NTG, will set up for repeat study.   LE claudication - no significant PAD on arterial Doppler - DVT ruled out by venous Doppler.  Neck pain: -No significant carotid stenosis.   Follow up in 3-4 months

## 2024-04-25 ENCOUNTER — EMERGENCY (EMERGENCY)
Facility: HOSPITAL | Age: 74
LOS: 0 days | Discharge: HOME | End: 2024-04-25
Attending: EMERGENCY MEDICINE
Payer: MEDICARE

## 2024-04-25 VITALS
HEART RATE: 71 BPM | DIASTOLIC BLOOD PRESSURE: 60 MMHG | TEMPERATURE: 98 F | SYSTOLIC BLOOD PRESSURE: 132 MMHG | RESPIRATION RATE: 20 BRPM | OXYGEN SATURATION: 99 % | WEIGHT: 199.96 LBS

## 2024-04-25 VITALS — HEART RATE: 70 BPM

## 2024-04-25 DIAGNOSIS — Z88.6 ALLERGY STATUS TO ANALGESIC AGENT: ICD-10-CM

## 2024-04-25 DIAGNOSIS — Z91.041 RADIOGRAPHIC DYE ALLERGY STATUS: ICD-10-CM

## 2024-04-25 DIAGNOSIS — Z98.890 OTHER SPECIFIED POSTPROCEDURAL STATES: Chronic | ICD-10-CM

## 2024-04-25 DIAGNOSIS — Z88.1 ALLERGY STATUS TO OTHER ANTIBIOTIC AGENTS STATUS: ICD-10-CM

## 2024-04-25 DIAGNOSIS — Z98.42 CATARACT EXTRACTION STATUS, LEFT EYE: Chronic | ICD-10-CM

## 2024-04-25 DIAGNOSIS — Z91.040 LATEX ALLERGY STATUS: ICD-10-CM

## 2024-04-25 DIAGNOSIS — R07.89 OTHER CHEST PAIN: ICD-10-CM

## 2024-04-25 DIAGNOSIS — Z98.891 HISTORY OF UTERINE SCAR FROM PREVIOUS SURGERY: Chronic | ICD-10-CM

## 2024-04-25 DIAGNOSIS — Z86.711 PERSONAL HISTORY OF PULMONARY EMBOLISM: ICD-10-CM

## 2024-04-25 DIAGNOSIS — Z88.8 ALLERGY STATUS TO OTHER DRUGS, MEDICAMENTS AND BIOLOGICAL SUBSTANCES: ICD-10-CM

## 2024-04-25 DIAGNOSIS — R00.2 PALPITATIONS: ICD-10-CM

## 2024-04-25 DIAGNOSIS — Z88.5 ALLERGY STATUS TO NARCOTIC AGENT: ICD-10-CM

## 2024-04-25 DIAGNOSIS — Z91.09 OTHER ALLERGY STATUS, OTHER THAN TO DRUGS AND BIOLOGICAL SUBSTANCES: ICD-10-CM

## 2024-04-25 DIAGNOSIS — Z88.0 ALLERGY STATUS TO PENICILLIN: ICD-10-CM

## 2024-04-25 LAB
ALBUMIN SERPL ELPH-MCNC: 4.1 G/DL — SIGNIFICANT CHANGE UP (ref 3.5–5.2)
ALP SERPL-CCNC: 124 U/L — HIGH (ref 30–115)
ALT FLD-CCNC: 19 U/L — SIGNIFICANT CHANGE UP (ref 0–41)
ANION GAP SERPL CALC-SCNC: 12 MMOL/L — SIGNIFICANT CHANGE UP (ref 7–14)
AST SERPL-CCNC: 21 U/L — SIGNIFICANT CHANGE UP (ref 0–41)
BASOPHILS # BLD AUTO: 0.03 K/UL — SIGNIFICANT CHANGE UP (ref 0–0.2)
BASOPHILS NFR BLD AUTO: 0.4 % — SIGNIFICANT CHANGE UP (ref 0–1)
BILIRUB SERPL-MCNC: 0.2 MG/DL — SIGNIFICANT CHANGE UP (ref 0.2–1.2)
BUN SERPL-MCNC: 15 MG/DL — SIGNIFICANT CHANGE UP (ref 10–20)
CALCIUM SERPL-MCNC: 9.2 MG/DL — SIGNIFICANT CHANGE UP (ref 8.4–10.5)
CHLORIDE SERPL-SCNC: 98 MMOL/L — SIGNIFICANT CHANGE UP (ref 98–110)
CO2 SERPL-SCNC: 27 MMOL/L — SIGNIFICANT CHANGE UP (ref 17–32)
CREAT SERPL-MCNC: 0.9 MG/DL — SIGNIFICANT CHANGE UP (ref 0.7–1.5)
EGFR: 68 ML/MIN/1.73M2 — SIGNIFICANT CHANGE UP
EOSINOPHIL # BLD AUTO: 0.19 K/UL — SIGNIFICANT CHANGE UP (ref 0–0.7)
EOSINOPHIL NFR BLD AUTO: 2.3 % — SIGNIFICANT CHANGE UP (ref 0–8)
GLUCOSE SERPL-MCNC: 124 MG/DL — HIGH (ref 70–99)
HCT VFR BLD CALC: 38.6 % — SIGNIFICANT CHANGE UP (ref 37–47)
HGB BLD-MCNC: 13.1 G/DL — SIGNIFICANT CHANGE UP (ref 12–16)
IMM GRANULOCYTES NFR BLD AUTO: 0.4 % — HIGH (ref 0.1–0.3)
LIDOCAIN IGE QN: 37 U/L — SIGNIFICANT CHANGE UP (ref 7–60)
LYMPHOCYTES # BLD AUTO: 2.1 K/UL — SIGNIFICANT CHANGE UP (ref 1.2–3.4)
LYMPHOCYTES # BLD AUTO: 24.9 % — SIGNIFICANT CHANGE UP (ref 20.5–51.1)
MAGNESIUM SERPL-MCNC: 2.2 MG/DL — SIGNIFICANT CHANGE UP (ref 1.8–2.4)
MCHC RBC-ENTMCNC: 30.8 PG — SIGNIFICANT CHANGE UP (ref 27–31)
MCHC RBC-ENTMCNC: 33.9 G/DL — SIGNIFICANT CHANGE UP (ref 32–37)
MCV RBC AUTO: 90.6 FL — SIGNIFICANT CHANGE UP (ref 81–99)
MONOCYTES # BLD AUTO: 0.86 K/UL — HIGH (ref 0.1–0.6)
MONOCYTES NFR BLD AUTO: 10.2 % — HIGH (ref 1.7–9.3)
NEUTROPHILS # BLD AUTO: 5.22 K/UL — SIGNIFICANT CHANGE UP (ref 1.4–6.5)
NEUTROPHILS NFR BLD AUTO: 61.8 % — SIGNIFICANT CHANGE UP (ref 42.2–75.2)
NRBC # BLD: 0 /100 WBCS — SIGNIFICANT CHANGE UP (ref 0–0)
NT-PROBNP SERPL-SCNC: 96 PG/ML — SIGNIFICANT CHANGE UP (ref 0–300)
PLATELET # BLD AUTO: 241 K/UL — SIGNIFICANT CHANGE UP (ref 130–400)
PMV BLD: 10.4 FL — SIGNIFICANT CHANGE UP (ref 7.4–10.4)
POTASSIUM SERPL-MCNC: 3.5 MMOL/L — SIGNIFICANT CHANGE UP (ref 3.5–5)
POTASSIUM SERPL-SCNC: 3.5 MMOL/L — SIGNIFICANT CHANGE UP (ref 3.5–5)
PROT SERPL-MCNC: 6.8 G/DL — SIGNIFICANT CHANGE UP (ref 6–8)
RBC # BLD: 4.26 M/UL — SIGNIFICANT CHANGE UP (ref 4.2–5.4)
RBC # FLD: 13.8 % — SIGNIFICANT CHANGE UP (ref 11.5–14.5)
SODIUM SERPL-SCNC: 137 MMOL/L — SIGNIFICANT CHANGE UP (ref 135–146)
TROPONIN SAMPLING TIME: 1810 — SIGNIFICANT CHANGE UP
TROPONIN T, HIGH SENSITIVITY RESULT: <6 NG/L — SIGNIFICANT CHANGE UP (ref 6–13)
TROPONIN T, HIGH SENSITIVITY RESULT: <6 NG/L — SIGNIFICANT CHANGE UP (ref 6–13)
WBC # BLD: 8.43 K/UL — SIGNIFICANT CHANGE UP (ref 4.8–10.8)
WBC # FLD AUTO: 8.43 K/UL — SIGNIFICANT CHANGE UP (ref 4.8–10.8)

## 2024-04-25 PROCEDURE — 71045 X-RAY EXAM CHEST 1 VIEW: CPT

## 2024-04-25 PROCEDURE — 99285 EMERGENCY DEPT VISIT HI MDM: CPT | Mod: FS

## 2024-04-25 PROCEDURE — 80053 COMPREHEN METABOLIC PANEL: CPT

## 2024-04-25 PROCEDURE — 93010 ELECTROCARDIOGRAM REPORT: CPT

## 2024-04-25 PROCEDURE — 83735 ASSAY OF MAGNESIUM: CPT

## 2024-04-25 PROCEDURE — 85025 COMPLETE CBC W/AUTO DIFF WBC: CPT

## 2024-04-25 PROCEDURE — 83880 ASSAY OF NATRIURETIC PEPTIDE: CPT

## 2024-04-25 PROCEDURE — 83690 ASSAY OF LIPASE: CPT

## 2024-04-25 PROCEDURE — 84484 ASSAY OF TROPONIN QUANT: CPT

## 2024-04-25 PROCEDURE — 93005 ELECTROCARDIOGRAM TRACING: CPT

## 2024-04-25 PROCEDURE — 36415 COLL VENOUS BLD VENIPUNCTURE: CPT

## 2024-04-25 PROCEDURE — 99285 EMERGENCY DEPT VISIT HI MDM: CPT | Mod: 25

## 2024-04-25 PROCEDURE — 71045 X-RAY EXAM CHEST 1 VIEW: CPT | Mod: 26

## 2024-04-25 RX ORDER — SODIUM CHLORIDE 9 MG/ML
1000 INJECTION, SOLUTION INTRAVENOUS ONCE
Refills: 0 | Status: COMPLETED | OUTPATIENT
Start: 2024-04-25 | End: 2024-04-25

## 2024-04-25 RX ORDER — ALPRAZOLAM 0.25 MG
0.25 TABLET ORAL ONCE
Refills: 0 | Status: DISCONTINUED | OUTPATIENT
Start: 2024-04-25 | End: 2024-04-25

## 2024-04-25 RX ADMIN — Medication 0.25 MILLIGRAM(S): at 18:48

## 2024-04-25 RX ADMIN — SODIUM CHLORIDE 1000 MILLILITER(S): 9 INJECTION, SOLUTION INTRAVENOUS at 18:41

## 2024-04-25 NOTE — ED ADULT NURSE NOTE - NS ED NURSE DISCH DISPOSITION
Pt refusing to leave room - lying in bed. Patient states that she has cramps. Pt refusing PRN medication for pain.   Discharged

## 2024-04-25 NOTE — ED PROVIDER NOTE - ATTENDING APP SHARED VISIT CONTRIBUTION OF CARE
I personally evaluated the patient. I reviewed the Resident’s or Physician Assistant’s note (as assigned above), and agree with the findings and plan except as documented in my note.     73 female here for chest pain and palpitations. Events began after having an admittedly emotionally charged day, "had to put the dog down". States she gets palpitations when her heart rate is above 80 bpm.     Extensive cardiac workup with limited ability to get tests due to a variety of contraindications and incompatibility but is followed by local cardiologist.     Symptoms improved at time of ED eval.     PE: female in no distress. CV: pulses intact. CHEST: normal work of breathing. SKIN: normal. NEURO: AAO 3 no focal deficits.     Impression: chest pain     Plan: IV labs imaging supportive care and reevaluation

## 2024-04-25 NOTE — ED PROVIDER NOTE - RATE
"Anesthesia Post Evaluation    Patient: Tonya Cohn    Procedure(s) Performed: Procedure(s) (LRB):  ESOPHAGOGASTRODUODENOSCOPY (EGD) (N/A)    Final Anesthesia Type: general  Patient location during evaluation: PACU  Patient participation: Yes- Able to Participate  Level of consciousness: awake and alert  Post-procedure vital signs: reviewed and stable  Pain management: adequate  Airway patency: patent  PONV status at discharge: No PONV  Anesthetic complications: no      Cardiovascular status: hemodynamically stable  Respiratory status: unassisted  Hydration status: euvolemic  Follow-up not needed.        Visit Vitals  /75 (BP Location: Left arm, Patient Position: Lying)   Pulse 87   Temp 36.5 °C (97.7 °F) (Temporal)   Resp 16   Ht 5' 6" (1.676 m)   Wt 93.5 kg (206 lb 2 oz)   SpO2 98%   Breastfeeding? No   BMI 33.27 kg/m²       Pain/Ophelia Score: Pain Assessment Performed: Yes (12/12/2017  9:35 AM)  Presence of Pain: denies (12/12/2017  9:54 AM)  Ophelia Score: 10 (12/12/2017  9:53 AM)      "
86

## 2024-04-25 NOTE — ED PROVIDER NOTE - OBJECTIVE STATEMENT
Patient is a 73-year-old female history of PE, atrial tachycardia here for evaluation of left-sided chest discomfort after dog passed away earlier today.  Patient denies leg swelling, fever, chills

## 2024-04-25 NOTE — ED PROVIDER NOTE - CLINICAL SUMMARY MEDICAL DECISION MAKING FREE TEXT BOX
73 female here for chest pain. Had screening labs imaging supportive care medications and reevaluation, no acute emergent findings, symptoms improved, plan discussed with patient, will discharge with outpatient management and return and follow up instructions.

## 2024-04-25 NOTE — ED PROVIDER NOTE - PATIENT PORTAL LINK FT
You can access the FollowMyHealth Patient Portal offered by [facility] by registering at the following website: [email or web address]. By joining [facility]’s FollowMyHealth portal, you will also be able to view your health information using other applications (apps) compatible with our system.

## 2024-04-27 ENCOUNTER — EMERGENCY (EMERGENCY)
Facility: HOSPITAL | Age: 74
LOS: 0 days | Discharge: HOME | End: 2024-04-27
Attending: EMERGENCY MEDICINE
Payer: MEDICARE

## 2024-04-27 VITALS
RESPIRATION RATE: 18 BRPM | HEART RATE: 77 BPM | OXYGEN SATURATION: 97 % | TEMPERATURE: 97 F | SYSTOLIC BLOOD PRESSURE: 132 MMHG | DIASTOLIC BLOOD PRESSURE: 67 MMHG

## 2024-04-27 VITALS
SYSTOLIC BLOOD PRESSURE: 143 MMHG | OXYGEN SATURATION: 97 % | RESPIRATION RATE: 18 BRPM | DIASTOLIC BLOOD PRESSURE: 63 MMHG | HEART RATE: 65 BPM

## 2024-04-27 DIAGNOSIS — Z88.0 ALLERGY STATUS TO PENICILLIN: ICD-10-CM

## 2024-04-27 DIAGNOSIS — Z98.891 HISTORY OF UTERINE SCAR FROM PREVIOUS SURGERY: Chronic | ICD-10-CM

## 2024-04-27 DIAGNOSIS — I49.1 ATRIAL PREMATURE DEPOLARIZATION: ICD-10-CM

## 2024-04-27 DIAGNOSIS — R00.2 PALPITATIONS: ICD-10-CM

## 2024-04-27 DIAGNOSIS — Z88.6 ALLERGY STATUS TO ANALGESIC AGENT: ICD-10-CM

## 2024-04-27 DIAGNOSIS — Z86.711 PERSONAL HISTORY OF PULMONARY EMBOLISM: ICD-10-CM

## 2024-04-27 DIAGNOSIS — Z88.8 ALLERGY STATUS TO OTHER DRUGS, MEDICAMENTS AND BIOLOGICAL SUBSTANCES: ICD-10-CM

## 2024-04-27 DIAGNOSIS — Z98.42 CATARACT EXTRACTION STATUS, LEFT EYE: Chronic | ICD-10-CM

## 2024-04-27 DIAGNOSIS — Z91.040 LATEX ALLERGY STATUS: ICD-10-CM

## 2024-04-27 DIAGNOSIS — F41.9 ANXIETY DISORDER, UNSPECIFIED: ICD-10-CM

## 2024-04-27 DIAGNOSIS — Z86.79 PERSONAL HISTORY OF OTHER DISEASES OF THE CIRCULATORY SYSTEM: ICD-10-CM

## 2024-04-27 DIAGNOSIS — Z91.09 OTHER ALLERGY STATUS, OTHER THAN TO DRUGS AND BIOLOGICAL SUBSTANCES: ICD-10-CM

## 2024-04-27 DIAGNOSIS — Z98.890 OTHER SPECIFIED POSTPROCEDURAL STATES: Chronic | ICD-10-CM

## 2024-04-27 DIAGNOSIS — Z91.041 RADIOGRAPHIC DYE ALLERGY STATUS: ICD-10-CM

## 2024-04-27 DIAGNOSIS — Z88.1 ALLERGY STATUS TO OTHER ANTIBIOTIC AGENTS: ICD-10-CM

## 2024-04-27 DIAGNOSIS — Z88.5 ALLERGY STATUS TO NARCOTIC AGENT: ICD-10-CM

## 2024-04-27 LAB
ALBUMIN SERPL ELPH-MCNC: 4.2 G/DL — SIGNIFICANT CHANGE UP (ref 3.5–5.2)
ALP SERPL-CCNC: 117 U/L — HIGH (ref 30–115)
ALT FLD-CCNC: 16 U/L — SIGNIFICANT CHANGE UP (ref 0–41)
ANION GAP SERPL CALC-SCNC: 9 MMOL/L — SIGNIFICANT CHANGE UP (ref 7–14)
APPEARANCE UR: CLEAR — SIGNIFICANT CHANGE UP
AST SERPL-CCNC: 17 U/L — SIGNIFICANT CHANGE UP (ref 0–41)
BACTERIA # UR AUTO: ABNORMAL /HPF
BASOPHILS # BLD AUTO: 0.03 K/UL — SIGNIFICANT CHANGE UP (ref 0–0.2)
BASOPHILS NFR BLD AUTO: 0.4 % — SIGNIFICANT CHANGE UP (ref 0–1)
BILIRUB SERPL-MCNC: <0.2 MG/DL — SIGNIFICANT CHANGE UP (ref 0.2–1.2)
BILIRUB UR-MCNC: NEGATIVE — SIGNIFICANT CHANGE UP
BUN SERPL-MCNC: 16 MG/DL — SIGNIFICANT CHANGE UP (ref 10–20)
CALCIUM SERPL-MCNC: 9 MG/DL — SIGNIFICANT CHANGE UP (ref 8.4–10.5)
CHLORIDE SERPL-SCNC: 98 MMOL/L — SIGNIFICANT CHANGE UP (ref 98–110)
CO2 SERPL-SCNC: 29 MMOL/L — SIGNIFICANT CHANGE UP (ref 17–32)
COLOR SPEC: YELLOW — SIGNIFICANT CHANGE UP
CREAT SERPL-MCNC: 0.8 MG/DL — SIGNIFICANT CHANGE UP (ref 0.7–1.5)
DIFF PNL FLD: ABNORMAL
EGFR: 78 ML/MIN/1.73M2 — SIGNIFICANT CHANGE UP
EOSINOPHIL # BLD AUTO: 0.13 K/UL — SIGNIFICANT CHANGE UP (ref 0–0.7)
EOSINOPHIL NFR BLD AUTO: 1.7 % — SIGNIFICANT CHANGE UP (ref 0–8)
EPI CELLS # UR: SIGNIFICANT CHANGE UP
GLUCOSE SERPL-MCNC: 105 MG/DL — HIGH (ref 70–99)
GLUCOSE UR QL: NEGATIVE MG/DL — SIGNIFICANT CHANGE UP
HCT VFR BLD CALC: 37 % — SIGNIFICANT CHANGE UP (ref 37–47)
HGB BLD-MCNC: 12.6 G/DL — SIGNIFICANT CHANGE UP (ref 12–16)
IMM GRANULOCYTES NFR BLD AUTO: 0.3 % — SIGNIFICANT CHANGE UP (ref 0.1–0.3)
KETONES UR-MCNC: NEGATIVE MG/DL — SIGNIFICANT CHANGE UP
LEUKOCYTE ESTERASE UR-ACNC: ABNORMAL
LYMPHOCYTES # BLD AUTO: 1.95 K/UL — SIGNIFICANT CHANGE UP (ref 1.2–3.4)
LYMPHOCYTES # BLD AUTO: 26.2 % — SIGNIFICANT CHANGE UP (ref 20.5–51.1)
MCHC RBC-ENTMCNC: 30.7 PG — SIGNIFICANT CHANGE UP (ref 27–31)
MCHC RBC-ENTMCNC: 34.1 G/DL — SIGNIFICANT CHANGE UP (ref 32–37)
MCV RBC AUTO: 90.2 FL — SIGNIFICANT CHANGE UP (ref 81–99)
MONOCYTES # BLD AUTO: 0.74 K/UL — HIGH (ref 0.1–0.6)
MONOCYTES NFR BLD AUTO: 10 % — HIGH (ref 1.7–9.3)
NEUTROPHILS # BLD AUTO: 4.56 K/UL — SIGNIFICANT CHANGE UP (ref 1.4–6.5)
NEUTROPHILS NFR BLD AUTO: 61.4 % — SIGNIFICANT CHANGE UP (ref 42.2–75.2)
NITRITE UR-MCNC: NEGATIVE — SIGNIFICANT CHANGE UP
NRBC # BLD: 0 /100 WBCS — SIGNIFICANT CHANGE UP (ref 0–0)
NT-PROBNP SERPL-SCNC: 105 PG/ML — SIGNIFICANT CHANGE UP (ref 0–300)
PH UR: 7 — SIGNIFICANT CHANGE UP (ref 5–8)
PLATELET # BLD AUTO: 238 K/UL — SIGNIFICANT CHANGE UP (ref 130–400)
PMV BLD: 10.4 FL — SIGNIFICANT CHANGE UP (ref 7.4–10.4)
POTASSIUM SERPL-MCNC: 3.9 MMOL/L — SIGNIFICANT CHANGE UP (ref 3.5–5)
POTASSIUM SERPL-SCNC: 3.9 MMOL/L — SIGNIFICANT CHANGE UP (ref 3.5–5)
PROT SERPL-MCNC: 6.5 G/DL — SIGNIFICANT CHANGE UP (ref 6–8)
PROT UR-MCNC: NEGATIVE MG/DL — SIGNIFICANT CHANGE UP
RBC # BLD: 4.1 M/UL — LOW (ref 4.2–5.4)
RBC # FLD: 13.5 % — SIGNIFICANT CHANGE UP (ref 11.5–14.5)
RBC CASTS # UR COMP ASSIST: 2 /HPF — SIGNIFICANT CHANGE UP (ref 0–4)
SODIUM SERPL-SCNC: 136 MMOL/L — SIGNIFICANT CHANGE UP (ref 135–146)
SP GR SPEC: 1.01 — SIGNIFICANT CHANGE UP (ref 1–1.03)
TROPONIN T, HIGH SENSITIVITY RESULT: <6 NG/L — SIGNIFICANT CHANGE UP (ref 6–13)
UROBILINOGEN FLD QL: 0.2 MG/DL — SIGNIFICANT CHANGE UP (ref 0.2–1)
WBC # BLD: 7.43 K/UL — SIGNIFICANT CHANGE UP (ref 4.8–10.8)
WBC # FLD AUTO: 7.43 K/UL — SIGNIFICANT CHANGE UP (ref 4.8–10.8)
WBC UR QL: 25 /HPF — HIGH (ref 0–5)

## 2024-04-27 PROCEDURE — 99285 EMERGENCY DEPT VISIT HI MDM: CPT | Mod: FS

## 2024-04-27 PROCEDURE — 81001 URINALYSIS AUTO W/SCOPE: CPT

## 2024-04-27 PROCEDURE — 71045 X-RAY EXAM CHEST 1 VIEW: CPT

## 2024-04-27 PROCEDURE — 99285 EMERGENCY DEPT VISIT HI MDM: CPT | Mod: 25

## 2024-04-27 PROCEDURE — 36415 COLL VENOUS BLD VENIPUNCTURE: CPT

## 2024-04-27 PROCEDURE — 85025 COMPLETE CBC W/AUTO DIFF WBC: CPT

## 2024-04-27 PROCEDURE — 71045 X-RAY EXAM CHEST 1 VIEW: CPT | Mod: 26

## 2024-04-27 PROCEDURE — 93005 ELECTROCARDIOGRAM TRACING: CPT

## 2024-04-27 PROCEDURE — 84484 ASSAY OF TROPONIN QUANT: CPT

## 2024-04-27 PROCEDURE — 83880 ASSAY OF NATRIURETIC PEPTIDE: CPT

## 2024-04-27 PROCEDURE — 93010 ELECTROCARDIOGRAM REPORT: CPT

## 2024-04-27 PROCEDURE — 80053 COMPREHEN METABOLIC PANEL: CPT

## 2024-04-27 RX ORDER — DIAZEPAM 5 MG
2 TABLET ORAL ONCE
Refills: 0 | Status: DISCONTINUED | OUTPATIENT
Start: 2024-04-27 | End: 2024-04-27

## 2024-04-27 RX ORDER — ALPRAZOLAM 0.25 MG
0.5 TABLET ORAL ONCE
Refills: 0 | Status: DISCONTINUED | OUTPATIENT
Start: 2024-04-27 | End: 2024-04-27

## 2024-04-27 RX ADMIN — Medication 2 MILLIGRAM(S): at 20:16

## 2024-04-27 RX ADMIN — Medication 0.5 MILLIGRAM(S): at 19:12

## 2024-04-27 NOTE — ED PROVIDER NOTE - NSFOLLOWUPINSTRUCTIONS_ED_ALL_ED_FT
FOLLOW UP WITH DR HANEY    Palpitations    A palpitation is the feeling that your heartbeat is irregular or is faster than normal. It may feel like your heart is fluttering or skipping a beat. They may be caused by many things, including smoking, caffeine, alcohol, stress, and certain medicines. Although most causes of palpitations are not serious, palpitations can be a sign of a serious medical problem. Avoid caffeine, alcohol, tobacco products at home. Try to reduce stress and anxiety, and make sure to get plenty of rest.     SEEK IMMEDIATE MEDICAL CARE IF YOU HAVE THE FOLLOWING SYMPTOMS: chest pain, shortness of breath, severe headache, or dizziness/lightheadedness.

## 2024-04-27 NOTE — ED PROVIDER NOTE - PATIENT PORTAL LINK FT
You can access the FollowMyHealth Patient Portal offered by [facility] by registering at the following website: [email or web address]. By joining [employer]s FollowMyHealth portal, you will also be able to view your health information using other applications (apps) compatible with our system.

## 2024-04-27 NOTE — ED ADULT TRIAGE NOTE - PRO INTERPRETER NEED 2
Problem: Adult Inpatient Plan of Care  Goal: Plan of Care Review  Outcome: Ongoing (interventions implemented as appropriate)  Pt on documented O2. Will continue to monitor.         English

## 2024-04-27 NOTE — ED PROVIDER NOTE - OBJECTIVE STATEMENT
Patient is a 73-year-old female with pmhx of PACs, anxiety, migraines, PE, presents with worsening palpitations for few days, aggravated by recent death of pet dog. She states the pain has been intermittent. She denies any other complaints.

## 2024-04-27 NOTE — ED PROVIDER NOTE - PHYSICAL EXAMINATION
As Follows:  CONST: Well appearing in NAD  EYES: PERRL, EOMI, Sclera and conjunctiva clear.   CARD: No murmurs, rubs, or gallops; Normal rate and rhythm   RESP: BS Equal B/L, No wheezes, rhonchi or rales. No distress or accessory breathing  GI: Soft, non-tender, non-distended.  SKIN: Warm, dry, no acute rashes. MMM  NEURO: A&Ox3, No focal deficits. Strength and sensation intact. Steady gait

## 2024-04-27 NOTE — ED PROVIDER NOTE - ATTENDING APP SHARED VISIT CONTRIBUTION OF CARE
I have personally performed a history and physical exam on this patient and personally directed the management of the patient. Patient is a 73-year-old female past medical history of PACs presents for evaluation of worsening palpitations several days prior after her dog has recently  denies any actual chest pain shortness of breath diaphoresis nausea vomiting fevers chills or abdominal pain    On physical exam overall patient well-appearing normocephalic atraumatic pupils equal round reactive light accommodation extraocular muscles intact oropharynx clear chest clear to auscultation bilaterally radial pulses 2+ and equal pedal pulse 2+ and equal no edema noted    Assessment plan patient presents for evaluation of palpitations overall well-appearing we obtained EKG per my independent evaluation not consistent with STEMI not consistent with arrhythmia does have PVCs occasionally on the monitor in addition we obtain chest x-ray provide pending evaluation not consistent with pneumothorax or pneumonia will you obtain labs troponin is negative signed out to night physician for further evaluation

## 2024-04-27 NOTE — ED PROVIDER NOTE - PROGRESS NOTE DETAILS
KA - Discussed results with patient. Endorses no current symptoms s/p medications given in ED. Agreeable to dc with follow up with Dr Merino.

## 2024-04-27 NOTE — ED PROVIDER NOTE - CLINICAL SUMMARY MEDICAL DECISION MAKING FREE TEXT BOX
73-year-old female, history of PACs, anxiety, migraines, PE, presents with worsening palpitations for few days, aggravated by recent death of pet dog.  Exam unremarkable.  EKG normal sinus rhythm, PACs, no acute changes.  Chest x-ray negative.  Labs unremarkable including troponin less than 6.  Will DC and refer to PCP, cardiologist.

## 2024-04-27 NOTE — ED PROVIDER NOTE - ADDITIONAL NOTES AND INSTRUCTIONS:
Ms Staples was seen in the Emergency Department on 4/27/24 and can return to school or work by the listed date with activity as tolerated.

## 2024-04-30 ENCOUNTER — APPOINTMENT (OUTPATIENT)
Dept: CARDIOLOGY | Facility: CLINIC | Age: 74
End: 2024-04-30
Payer: MEDICARE

## 2024-04-30 VITALS
DIASTOLIC BLOOD PRESSURE: 74 MMHG | HEART RATE: 89 BPM | WEIGHT: 209 LBS | BODY MASS INDEX: 30.96 KG/M2 | HEIGHT: 69 IN | SYSTOLIC BLOOD PRESSURE: 124 MMHG | OXYGEN SATURATION: 98 %

## 2024-04-30 DIAGNOSIS — R00.2 PALPITATIONS: ICD-10-CM

## 2024-04-30 DIAGNOSIS — R07.9 CHEST PAIN, UNSPECIFIED: ICD-10-CM

## 2024-04-30 DIAGNOSIS — G47.33 OBSTRUCTIVE SLEEP APNEA (ADULT) (PEDIATRIC): ICD-10-CM

## 2024-04-30 DIAGNOSIS — E03.9 HYPOTHYROIDISM, UNSPECIFIED: ICD-10-CM

## 2024-04-30 PROCEDURE — 99214 OFFICE O/P EST MOD 30 MIN: CPT

## 2024-04-30 PROCEDURE — G2211 COMPLEX E/M VISIT ADD ON: CPT

## 2024-04-30 RX ORDER — DILTIAZEM HYDROCHLORIDE 240 MG/1
240 CAPSULE, EXTENDED RELEASE ORAL
Qty: 90 | Refills: 0 | Status: ACTIVE | COMMUNITY
Start: 2022-04-18 | End: 1900-01-01

## 2024-04-30 NOTE — ASSESSMENT
[FreeTextEntry1] : Dizziness: Likely due to BPPV and vertigo -Continue to follow with ENT.   Palpitations: SVT seen on monitor.  - Increase Diltiazem  mg PO to 240mg PO daily.  - Diltiazem 30mg IR PRN for breakthrough palpitations.  -EP referral for possible SVT ablation.   Chest pain - CTA from 2021 showed elevated Ca score and calcification in the distribution of LM - TTE showed normal LV function - patient complains of occasional chest pain -CCTA referral given.   LE claudication - no significant PAD on arterial Doppler - DVT ruled out by venous Doppler.  Neck pain: -No significant carotid stenosis.   Follow up in 3-4 months

## 2024-04-30 NOTE — HISTORY OF PRESENT ILLNESS
[FreeTextEntry1] : Ms. Staples is a 74yo F with PMHx of asthma, hypothyroid, ETHAN on CPAP, degenerative disease of cervical spine and PE who presents to \[facility]\"" care. Her PMD and former cardiologist is Dr. Claudio Ortiz. Patient developed PE post surgical procedure on her right shoulder at the end of 2019, was on Eliquis for 1 year.  Had vaginal polypectomy, rectal fistula repair within the last 3 years.  She complains of palpitations with HR up to 150 bpm, (on diltiazem 180 mg), occasional chest discomfort radiating to the left scapula. Reports "tiredness in her hips" with walking.  10/12/2022: Patient presents for cardiology F/U visit. CTA from 2021 ( done in the outside facility) showed elevated Ca score, calcification in the distribution of left main, no clear information due to multiple artifacts. Patient complains of occasional chest discomfort - dull ache on and off. Palpitations are controlled with Diltiazem.  -Patient was sent for CCTA but did not complete test due to refusing NTG. She is willing to try again without NTG.  04/18/23-Patient still with left shoulder pain. She will at times get pulling sensation under her breast. Also complains of some anterior neck pain. Willing to go for CCTA. She will get some trace edema in her ankles which subside with rest.  12/13/23-Still with chest pulling. Some dizziness which is happening mostly with quick movement. Similar to previous vertigo.  04/03/2024- Patient went to ER on 02/20/2024 with complains of palpitations and irregular heart beat, was found to be in SR with PACs. She was discharged the same day with Holter monitor, no changes were made in medical management. Palpitations are improved after stopping cocoa powder. Still some left sided CP.  04/29/24-Patient had increased palpitations after having to put her dog down. Some ectopy on EKGs but otherwise no significant arrhythmia. She still is having palpitations and left chest pain at times.

## 2024-05-01 ENCOUNTER — RX RENEWAL (OUTPATIENT)
Age: 74
End: 2024-05-01

## 2024-05-01 RX ORDER — DILTIAZEM HYDROCHLORIDE 30 MG/1
30 TABLET ORAL 4 TIMES DAILY
Qty: 120 | Refills: 0 | Status: ACTIVE | COMMUNITY
Start: 2024-04-03 | End: 1900-01-01

## 2024-06-17 RX ORDER — DIPHENHYDRAMINE HCL 50 MG/1
50 CAPSULE ORAL
Qty: 1 | Refills: 0 | Status: ACTIVE | COMMUNITY
Start: 2024-06-17 | End: 1900-01-01

## 2024-06-17 NOTE — ED ADULT NURSE NOTE - HOW OFTEN DO YOU HAVE A DRINK CONTAINING ALCOHOL?
Received DENIAL PA from [employer] for Glucagon (GVOKE HYPOPEN) 1 MG/0.2ML pen.   Scanned into [employer], routed to provider.  Reasoning must have contradictions to formularies (see below)      Phone or fax appeal       Never

## 2024-06-19 ENCOUNTER — OUTPATIENT (OUTPATIENT)
Dept: OUTPATIENT SERVICES | Facility: HOSPITAL | Age: 74
LOS: 1 days | End: 2024-06-19
Payer: MEDICARE

## 2024-06-19 DIAGNOSIS — Z98.42 CATARACT EXTRACTION STATUS, LEFT EYE: Chronic | ICD-10-CM

## 2024-06-19 DIAGNOSIS — Z00.8 ENCOUNTER FOR OTHER GENERAL EXAMINATION: ICD-10-CM

## 2024-06-19 DIAGNOSIS — Z98.890 OTHER SPECIFIED POSTPROCEDURAL STATES: Chronic | ICD-10-CM

## 2024-06-19 DIAGNOSIS — Z98.891 HISTORY OF UTERINE SCAR FROM PREVIOUS SURGERY: Chronic | ICD-10-CM

## 2024-06-19 DIAGNOSIS — R07.9 CHEST PAIN, UNSPECIFIED: ICD-10-CM

## 2024-06-19 PROCEDURE — 75574 CT ANGIO HRT W/3D IMAGE: CPT | Mod: 26,MH

## 2024-06-19 PROCEDURE — 75574 CT ANGIO HRT W/3D IMAGE: CPT

## 2024-06-19 RX ORDER — DILTIAZEM HYDROCHLORIDE 240 MG/1
10 CAPSULE, EXTENDED RELEASE ORAL ONCE
Refills: 0 | Status: COMPLETED | OUTPATIENT
Start: 2024-06-19 | End: 2024-06-19

## 2024-06-19 RX ORDER — DIPHENHYDRAMINE HCL 12.5MG/5ML
50 ELIXIR ORAL ONCE
Refills: 0 | Status: COMPLETED | OUTPATIENT
Start: 2024-06-19 | End: 2024-06-19

## 2024-06-19 RX ADMIN — DILTIAZEM HYDROCHLORIDE 10 MILLIGRAM(S): 240 CAPSULE, EXTENDED RELEASE ORAL at 11:20

## 2024-06-19 RX ADMIN — Medication 50 MILLIGRAM(S): at 11:07

## 2024-06-20 DIAGNOSIS — R07.9 CHEST PAIN, UNSPECIFIED: ICD-10-CM

## 2024-06-25 DIAGNOSIS — I25.10 ATHEROSCLEROTIC HEART DISEASE OF NATIVE CORONARY ARTERY W/OUT ANGINA PECTORIS: ICD-10-CM

## 2024-06-25 RX ORDER — ROSUVASTATIN CALCIUM 5 MG/1
5 TABLET, FILM COATED ORAL DAILY
Qty: 90 | Refills: 3 | Status: ACTIVE | COMMUNITY
Start: 2024-06-25 | End: 1900-01-01

## 2024-07-13 NOTE — PATIENT PROFILE ADULT - FUNCTIONAL ASSESSMENT - DAILY ACTIVITY 6.
ID band present and verified. Family is in the lobby. Contact # : [phone]. Curly Eaton 4 = No assist / stand by assistance

## 2024-07-16 ENCOUNTER — APPOINTMENT (OUTPATIENT)
Dept: ELECTROPHYSIOLOGY | Facility: CLINIC | Age: 74
End: 2024-07-16
Payer: MEDICARE

## 2024-07-16 VITALS
WEIGHT: 206 LBS | DIASTOLIC BLOOD PRESSURE: 78 MMHG | SYSTOLIC BLOOD PRESSURE: 128 MMHG | HEIGHT: 69 IN | HEART RATE: 69 BPM | OXYGEN SATURATION: 98 % | BODY MASS INDEX: 30.51 KG/M2

## 2024-07-16 DIAGNOSIS — R00.2 PALPITATIONS: ICD-10-CM

## 2024-07-16 DIAGNOSIS — G47.33 OBSTRUCTIVE SLEEP APNEA (ADULT) (PEDIATRIC): ICD-10-CM

## 2024-07-16 PROCEDURE — 99215 OFFICE O/P EST HI 40 MIN: CPT

## 2024-07-16 PROCEDURE — G2211 COMPLEX E/M VISIT ADD ON: CPT

## 2024-07-16 PROCEDURE — 93000 ELECTROCARDIOGRAM COMPLETE: CPT

## 2024-07-16 RX ORDER — DILTIAZEM HYDROCHLORIDE 180 MG/1
180 CAPSULE, EXTENDED RELEASE ORAL
Refills: 0 | Status: ACTIVE | COMMUNITY

## 2024-07-30 ENCOUNTER — APPOINTMENT (OUTPATIENT)
Dept: CARDIOLOGY | Facility: CLINIC | Age: 74
End: 2024-07-30

## 2024-08-08 ENCOUNTER — APPOINTMENT (OUTPATIENT)
Dept: CARDIOLOGY | Facility: CLINIC | Age: 74
End: 2024-08-08

## 2024-08-08 PROCEDURE — G2211 COMPLEX E/M VISIT ADD ON: CPT

## 2024-08-08 PROCEDURE — 99214 OFFICE O/P EST MOD 30 MIN: CPT

## 2024-08-08 PROCEDURE — 93000 ELECTROCARDIOGRAM COMPLETE: CPT

## 2024-08-08 NOTE — PHYSICAL EXAM
[Well Developed] : well developed [Well Nourished] : well nourished [No Acute Distress] : no acute distress [Normal Conjunctiva] : normal conjunctiva [Normal Venous Pressure] : normal venous pressure [5th Left ICS - MCL] : palpated at the 5th LICS in the midclavicular line [Normal] : normal [No Precordial Heave] : no precordial heave was noted [Normal Rate] : normal [Rhythm Regular] : regular [Normal S1] : normal S1 [Normal S2] : normal S2 [___ +] : bilateral [unfilled]U+ pitting edema to the ankles [No Murmur] : no murmurs heard [2+] : left 2+ [Clear Lung Fields] : clear lung fields [Good Air Entry] : good air entry [No Respiratory Distress] : no respiratory distress  [Soft] : abdomen soft [Non Tender] : non-tender [Normal Bowel Sounds] : normal bowel sounds [Normal Gait] : normal gait [No Varicosities] : no varicosities [Edema ___] : edema [unfilled] [No Rash] : no rash [Moves all extremities] : moves all extremities [No Focal Deficits] : no focal deficits [Alert and Oriented] : alert and oriented

## 2024-08-08 NOTE — HISTORY OF PRESENT ILLNESS
[FreeTextEntry1] : Ms. Staples is a 73yo F with PMHx of asthma, hypothyroid, ETHAN on CPAP, degenerative disease of cervical spine and PE who presents for follow up. Her PMD and former cardiologist is Dr. Claudio Ortiz. Patient developed PE post surgical procedure on her right shoulder at the end of 2019, was on Eliquis for 1 year.  Had vaginal polypectomy, rectal fistula repair within the last 3 years.  She complains of palpitations with HR up to 150 bpm, (on diltiazem 180 mg), occasional chest discomfort radiating to the left scapula. Reports "tiredness in her hips" with walking.  10/12/2022: Patient presents for cardiology F/U visit. CTA from 2021 ( done in the outside facility) showed elevated Ca score, calcification in the distribution of left main, no clear information due to multiple artifacts. Patient complains of occasional chest discomfort - dull ache on and off. Palpitations are controlled with Diltiazem.  -Patient was sent for CCTA but did not complete test due to refusing NTG. She is willing to try again without NTG.  04/18/23-Patient still with left shoulder pain. She will at times get pulling sensation under her breast. Also complains of some anterior neck pain. Willing to go for CCTA. She will get some trace edema in her ankles which subside with rest.  12/13/23-Still with chest pulling. Some dizziness which is happening mostly with quick movement. Similar to previous vertigo.  04/03/2024- Patient went to ER on 02/20/2024 with complains of palpitations and irregular heart beat, was found to be in SR with PACs. She was discharged the same day with Holter monitor, no changes were made in medical management. Palpitations are improved after stopping cocoa powder. Still some left sided CP.  04/29/24-Patient had increased palpitations after having to put her dog down. Some ectopy on EKGs but otherwise no significant arrhythmia. She still is having palpitations and left chest pain at times.  08/08/24-Patient went for CCTA which showed mild CAD. Her HA1c has increased.

## 2024-08-08 NOTE — REASON FOR VISIT
[Other: ____] : [unfilled] [FreeTextEntry1] : Diagnostic Tests: --------------------- EK24-NSR. Normal EKG.  24-NSR. NSST changes.  23-NSR. NSST changes.  23-NSR. NSST changes.  10/12/22-NSR. Nonspecific ST changes.  22-NSR. Minimal ST depressions.  21-NSR. Nonspecific ST changes.  --------------------- Echo: 24- TTE: EF 64%, Mild to moderate AR, AA 3.5 cm, trace MR 22-TTE: EF 69%. Mild AI, PI.  --------------------- Physio: 22-HESHAM/PVR: Normal HESHAM B/L. PVR nondiagnostic due to pain.  --------------------- US: 05/15/23-Carotid: <50% B/L ICA stenosis.  22-LE Arterial: B/L mild diffuse.  21-LE Venous: No DVT, superficial thrombophlebitis.  --------------------- CT: 24-CCTA: . dLM minimal. pLAD mild, mLAD minimal. pLCx mild. oRCA minimal.  21-CCTA:  (50-75%ile, LM and LAD). Motion artifact precludes accurate assessment.  ------------------ Holter monitor: -24- HR  bpm 69bpm average, PACs <2%, PVCs <1%, AVB 1 degree, SVTx1 116bpm 6 beats

## 2024-08-08 NOTE — ASSESSMENT
[FreeTextEntry1] : Dizziness: Likely due to BPPV and vertigo -Continue to follow with ENT.   Palpitations: SVT seen on monitor.  - Continue Diltiazem  mg PO to 240mg PO daily.  - Diltiazem 30mg IR PRN for breakthrough palpitations.  -EP referral for possible SVT ablation.   Chest pain - CTA from 2021 showed elevated Ca score and calcification in the distribution of LM - TTE showed normal LV function -Nonobstructive CAD seen.  -Did not tolerate rosuvastatin.  -Trial atorvastatin 10mg PO daily.  -Check labs in 6-8 weeks.   LE claudication - no significant PAD on arterial Doppler - DVT ruled out by venous Doppler.  Neck pain: -No significant carotid stenosis.   Follow up in 3-4 months

## 2024-10-11 NOTE — PATIENT PROFILE ADULT - DO YOU FEEL UNSAFE AT HOME, WORK, OR SCHOOL?
This patient has been assessed with a concern for Malnutrition and was treated during this hospitalization for the following Nutrition diagnosis/diagnoses:     -  10/11/2024: Morbid obesity (BMI > 40)   no

## 2024-10-16 ENCOUNTER — RESULT CHARGE (OUTPATIENT)
Age: 74
End: 2024-10-16

## 2024-10-16 ENCOUNTER — APPOINTMENT (OUTPATIENT)
Dept: CARDIOLOGY | Facility: CLINIC | Age: 74
End: 2024-10-16
Payer: MEDICARE

## 2024-10-16 VITALS
HEIGHT: 69 IN | DIASTOLIC BLOOD PRESSURE: 70 MMHG | BODY MASS INDEX: 31.1 KG/M2 | HEART RATE: 70 BPM | SYSTOLIC BLOOD PRESSURE: 114 MMHG | WEIGHT: 210 LBS

## 2024-10-16 DIAGNOSIS — G47.33 OBSTRUCTIVE SLEEP APNEA (ADULT) (PEDIATRIC): ICD-10-CM

## 2024-10-16 DIAGNOSIS — R00.2 PALPITATIONS: ICD-10-CM

## 2024-10-16 DIAGNOSIS — I73.9 PERIPHERAL VASCULAR DISEASE, UNSPECIFIED: ICD-10-CM

## 2024-10-16 DIAGNOSIS — E03.9 HYPOTHYROIDISM, UNSPECIFIED: ICD-10-CM

## 2024-10-16 DIAGNOSIS — I25.10 ATHEROSCLEROTIC HEART DISEASE OF NATIVE CORONARY ARTERY W/OUT ANGINA PECTORIS: ICD-10-CM

## 2024-10-16 PROCEDURE — 99214 OFFICE O/P EST MOD 30 MIN: CPT

## 2024-10-16 PROCEDURE — 93000 ELECTROCARDIOGRAM COMPLETE: CPT

## 2024-10-16 PROCEDURE — G2211 COMPLEX E/M VISIT ADD ON: CPT

## 2024-11-05 ENCOUNTER — OUTPATIENT (OUTPATIENT)
Dept: OUTPATIENT SERVICES | Facility: HOSPITAL | Age: 74
LOS: 1 days | End: 2024-11-05
Payer: MEDICARE

## 2024-11-05 ENCOUNTER — RESULT REVIEW (OUTPATIENT)
Age: 74
End: 2024-11-05

## 2024-11-05 ENCOUNTER — APPOINTMENT (OUTPATIENT)
Dept: CV DIAGNOSITCS | Facility: HOSPITAL | Age: 74
End: 2024-11-05
Payer: MEDICARE

## 2024-11-05 DIAGNOSIS — R00.2 PALPITATIONS: ICD-10-CM

## 2024-11-05 DIAGNOSIS — Z00.8 ENCOUNTER FOR OTHER GENERAL EXAMINATION: ICD-10-CM

## 2024-11-05 DIAGNOSIS — Z98.42 CATARACT EXTRACTION STATUS, LEFT EYE: Chronic | ICD-10-CM

## 2024-11-05 DIAGNOSIS — Z98.890 OTHER SPECIFIED POSTPROCEDURAL STATES: Chronic | ICD-10-CM

## 2024-11-05 DIAGNOSIS — Z98.891 HISTORY OF UTERINE SCAR FROM PREVIOUS SURGERY: Chronic | ICD-10-CM

## 2024-11-05 PROCEDURE — 93306 TTE W/DOPPLER COMPLETE: CPT | Mod: 26

## 2024-11-05 PROCEDURE — 76981 USE PARENCHYMA: CPT | Mod: XU

## 2024-11-05 PROCEDURE — 76700 US EXAM ABDOM COMPLETE: CPT | Mod: 26

## 2024-11-05 PROCEDURE — 76700 US EXAM ABDOM COMPLETE: CPT

## 2024-11-05 PROCEDURE — 76981 USE PARENCHYMA: CPT | Mod: 26,XU

## 2024-11-06 DIAGNOSIS — R00.2 PALPITATIONS: ICD-10-CM

## 2024-12-04 ENCOUNTER — APPOINTMENT (OUTPATIENT)
Dept: CARDIOLOGY | Facility: CLINIC | Age: 74
End: 2024-12-04
Payer: MEDICARE

## 2024-12-04 VITALS
HEIGHT: 69 IN | SYSTOLIC BLOOD PRESSURE: 128 MMHG | DIASTOLIC BLOOD PRESSURE: 70 MMHG | BODY MASS INDEX: 31.25 KG/M2 | HEART RATE: 65 BPM | WEIGHT: 211 LBS

## 2024-12-04 DIAGNOSIS — I25.10 ATHEROSCLEROTIC HEART DISEASE OF NATIVE CORONARY ARTERY W/OUT ANGINA PECTORIS: ICD-10-CM

## 2024-12-04 DIAGNOSIS — K76.0 FATTY (CHANGE OF) LIVER, NOT ELSEWHERE CLASSIFIED: ICD-10-CM

## 2024-12-04 DIAGNOSIS — E78.5 HYPERLIPIDEMIA, UNSPECIFIED: ICD-10-CM

## 2024-12-04 DIAGNOSIS — R07.9 CHEST PAIN, UNSPECIFIED: ICD-10-CM

## 2024-12-04 DIAGNOSIS — R00.2 PALPITATIONS: ICD-10-CM

## 2024-12-04 DIAGNOSIS — G47.33 OBSTRUCTIVE SLEEP APNEA (ADULT) (PEDIATRIC): ICD-10-CM

## 2024-12-04 DIAGNOSIS — E03.9 HYPOTHYROIDISM, UNSPECIFIED: ICD-10-CM

## 2024-12-04 PROCEDURE — 93000 ELECTROCARDIOGRAM COMPLETE: CPT

## 2024-12-04 PROCEDURE — 99214 OFFICE O/P EST MOD 30 MIN: CPT

## 2024-12-04 PROCEDURE — G2211 COMPLEX E/M VISIT ADD ON: CPT

## 2024-12-04 RX ORDER — PRAVASTATIN SODIUM 10 MG/1
10 TABLET ORAL
Refills: 0 | Status: ACTIVE | COMMUNITY

## 2025-01-29 LAB
ALBUMIN SERPL ELPH-MCNC: 4.1 G/DL
ALP BLD-CCNC: 124 U/L
ALT SERPL-CCNC: 20 U/L
ANION GAP SERPL CALC-SCNC: 8 MMOL/L
AST SERPL-CCNC: 19 U/L
BASOPHILS # BLD AUTO: 0.02 K/UL
BASOPHILS NFR BLD AUTO: 0.3 %
BILIRUB SERPL-MCNC: 0.2 MG/DL
BUN SERPL-MCNC: 16 MG/DL
CALCIUM SERPL-MCNC: 9.7 MG/DL
CHLORIDE SERPL-SCNC: 97 MMOL/L
CHOLEST SERPL-MCNC: 174 MG/DL
CO2 SERPL-SCNC: 31 MMOL/L
CREAT SERPL-MCNC: 0.8 MG/DL
EGFR: 77 ML/MIN/1.73M2
EOSINOPHIL # BLD AUTO: 0.14 K/UL
EOSINOPHIL NFR BLD AUTO: 2.3 %
GLUCOSE SERPL-MCNC: 86 MG/DL
HCT VFR BLD CALC: 39.7 %
HDLC SERPL-MCNC: 53 MG/DL
HGB BLD-MCNC: 13.1 G/DL
IMM GRANULOCYTES NFR BLD AUTO: 0.3 %
LDLC SERPL CALC-MCNC: 93 MG/DL
LYMPHOCYTES # BLD AUTO: 1.74 K/UL
LYMPHOCYTES NFR BLD AUTO: 28.2 %
MAN DIFF?: NORMAL
MCHC RBC-ENTMCNC: 30.8 PG
MCHC RBC-ENTMCNC: 33 G/DL
MCV RBC AUTO: 93.2 FL
MONOCYTES # BLD AUTO: 0.81 K/UL
MONOCYTES NFR BLD AUTO: 13.1 %
NEUTROPHILS # BLD AUTO: 3.45 K/UL
NEUTROPHILS NFR BLD AUTO: 55.8 %
NONHDLC SERPL-MCNC: 121 MG/DL
PLATELET # BLD AUTO: 248 K/UL
PMV BLD AUTO: 0 /100 WBCS
POTASSIUM SERPL-SCNC: 4 MMOL/L
PROT SERPL-MCNC: 7.1 G/DL
RBC # BLD: 4.26 M/UL
RBC # FLD: 14.9 %
SODIUM SERPL-SCNC: 136 MMOL/L
TRIGL SERPL-MCNC: 163 MG/DL
WBC # FLD AUTO: 6.18 K/UL

## 2025-02-02 NOTE — ED PROVIDER NOTE - NS ED ATTENDING STATEMENT MOD
Problem: Pain  Goal: Verbalizes/displays adequate comfort level or baseline comfort level  Outcome: Progressing  Flowsheets (Taken 2/2/2025 1029)  Verbalizes/displays adequate comfort level or baseline comfort level:   Encourage patient to monitor pain and request assistance   Assess pain using appropriate pain scale   Administer analgesics based on type and severity of pain and evaluate response   Implement non-pharmacological measures as appropriate and evaluate response     Problem: Gastrointestinal - Adult  Goal: Maintains or returns to baseline bowel function  Outcome: Progressing  Flowsheets (Taken 2/2/2025 1029)  Maintains or returns to baseline bowel function:   Assess bowel function   Encourage oral fluids to ensure adequate hydration     Problem: Safety - Adult  Goal: Free from fall injury  Outcome: Progressing  Flowsheets (Taken 2/2/2025 1029)  Free From Fall Injury:   Instruct family/caregiver on patient safety   Based on caregiver fall risk screen, instruct family/caregiver to ask for assistance with transferring infant if caregiver noted to have fall risk factors      I have personally performed a face to face diagnostic evaluation on this patient. I have reviewed the ACP note and agree with the history, exam and plan of care, except as noted.

## 2025-02-17 NOTE — ED ADULT NURSE NOTE - CHPI ED NUR SYMPTOMS NEG
Prenatal Care  Prenatal care is health care you get when pregnant. It helps you and your unborn baby stay as healthy as possible. Start prenatal care early in your pregnancy and continue to go to visits during your pregnancy.  Prenatal care may be given by a midwife, a family practice doctor, a nurse practitioner, physician assistant, or a childbirth and pregnancy doctor.  What are the benefits of prenatal care?  In prenatal care, your health care provider will get to know your medical history. You'll be checked for conditions that might affect you and your baby. Prenatal care will:  Lower the risk for problems as your child grows.  Lower certain risks for your baby, especially the risk that:  Your child may be born early.  Your child will have a low weight at birth.  What can I expect at the first prenatal care visit?  Your first visit will likely be the longest. You should ask to be seen as soon as you know you're pregnant. The first visit is a good time to talk about any questions or concerns. Make a list of questions to ask your provider at your visits.  Medical history  At your visit, you and your provider will talk about your medical history, including:  Your family's medical history and the medical history of the baby's father.  Any past pregnancies and long-term (chronic) health conditions.  Any surgeries or procedures you have had.  All medicines you're taking. Tell them if you're taking herbs or supplements too.  Any tobacco, alcohol, or drug use.  Other problems that may harm you and your baby. Tell them if:  You need food or housing.  You have been around chemicals or radiation.  Your partner yells at you, hits you, or hurts you.  Tests and screenings  Your provider will:  Do a physical exam, including a pelvic and breast exam.  Do tests to check for:  Urinary tract infection (UTI).  Sexually transmitted infections (STIs).  Low iron levels in your blood. This is called anemia.  Blood type and certain  proteins on red blood cells called Rh antibodies.  Infections and immunity to viruses, such as hepatitis B and rubella.  HIV.  Ask your provider if you need to be checked for genetic diseases.  Tips about staying healthy  Your provider will also give you information about how to keep yourself and your baby healthy, including:  Nutrition, vitamins, and food safety.  Physical activity.  How to treat some problems, such as morning sickness.  How to avoid infections and substances that may harm your baby.  Caring for your teeth.  Work and travel.  Problems that require you to call your provider.  How often will I have prenatal care visits?  After your first prenatal care visit, you will have regular visits throughout your pregnancy. You may visit your provider as follows:  Up to week 28 of pregnancy: once every 4 weeks.  28-36 weeks: once every 2 weeks.  After 36 weeks: every week until delivery.  Some people may have more visits. Others may have fewer. It all depends on your health and that of your baby.  Keep all prenatal visits. This is one way for you and your baby to stay as healthy as possible.  What happens during routine prenatal care visits?  Your provider will:  Check your weight and blood pressure.  Check your baby's heart sounds.  Ask questions about your diet, exercise, sleeping patterns, and whether you can feel the baby move.  Ask about any pregnancy symptoms you're having and how you're dealing with them. Tell your provider if:  You throw up or feel like you may throw up.  You have discharge or you bleed from your vagina.  You have trouble pooping (constipation).  You have swelling, headaches, or trouble seeing.  You are very tired, or you feel sad and anxious all the time.  You have discomfort, including back pain or pain in the pelvis.  Tell you problems to watch for during your pregnancy, including signs of labor.  Measure the height of your uterus in your belly. This is called fundal height.  What  tests might I have during prenatal care visits?  You may have blood, urine, and imaging tests. These may include:  Urine tests to check for blood sugar, protein, or signs of infection.  Genetic testing.  Ultrasounds to check your baby's growth, development, and well-being. Your baby may also be checked for congenital conditions.  Glucose tests to check for gestational diabetes. This is a form of diabetes that a person can get when pregnant.  A test to check for group B strep (GBS) infection.  What else can I expect during prenatal care visits?  Your provider may give you some vaccines. Getting certain vaccines during pregnancy can protect your baby after birth. These may include:  A flu shot.  Tdap (tetanus, diphtheria, pertussis) vaccine.  A COVID-19 vaccine.  A RSV vaccine.  Later in your pregnancy, your provider may talk to you about:  Childbirth and childbirth classes.  Breastfeeding and breastfeeding classes.  Birth control after your baby is born.  Where to find more information  Office on Women's Health: womenshealth.gov  American Pregnancy Association: americanpregnancy.org  March of Dimes: marchofdimes.org  This information is not intended to replace advice given to you by your health care provider. Make sure you discuss any questions you have with your health care provider.  Document Revised: 04/22/2024 Document Reviewed: 04/22/2024  Elsevier Patient Education © 2024 [employer]   no congestion/no diaphoresis/no dizziness/no nausea/no chills/no shortness of breath/no vomiting/no back pain/no chest pain/no syncope/no fever

## 2025-02-18 ENCOUNTER — APPOINTMENT (OUTPATIENT)
Dept: CARDIOLOGY | Facility: CLINIC | Age: 75
End: 2025-02-18

## 2025-03-13 ENCOUNTER — OUTPATIENT (OUTPATIENT)
Dept: OUTPATIENT SERVICES | Facility: HOSPITAL | Age: 75
LOS: 1 days | End: 2025-03-13
Payer: MEDICARE

## 2025-03-13 DIAGNOSIS — Z12.31 ENCOUNTER FOR SCREENING MAMMOGRAM FOR MALIGNANT NEOPLASM OF BREAST: ICD-10-CM

## 2025-03-13 DIAGNOSIS — Z98.891 HISTORY OF UTERINE SCAR FROM PREVIOUS SURGERY: Chronic | ICD-10-CM

## 2025-03-13 DIAGNOSIS — Z98.890 OTHER SPECIFIED POSTPROCEDURAL STATES: Chronic | ICD-10-CM

## 2025-03-13 DIAGNOSIS — Z98.42 CATARACT EXTRACTION STATUS, LEFT EYE: Chronic | ICD-10-CM

## 2025-03-13 PROCEDURE — 77063 BREAST TOMOSYNTHESIS BI: CPT

## 2025-03-13 PROCEDURE — 77063 BREAST TOMOSYNTHESIS BI: CPT | Mod: 26

## 2025-03-13 PROCEDURE — 77067 SCR MAMMO BI INCL CAD: CPT | Mod: 26

## 2025-03-13 PROCEDURE — 77067 SCR MAMMO BI INCL CAD: CPT

## 2025-03-14 DIAGNOSIS — Z12.31 ENCOUNTER FOR SCREENING MAMMOGRAM FOR MALIGNANT NEOPLASM OF BREAST: ICD-10-CM

## 2025-04-02 ENCOUNTER — LABORATORY RESULT (OUTPATIENT)
Age: 75
End: 2025-04-02

## 2025-04-02 ENCOUNTER — NON-APPOINTMENT (OUTPATIENT)
Age: 75
End: 2025-04-02

## 2025-04-02 ENCOUNTER — APPOINTMENT (OUTPATIENT)
Dept: CARDIOLOGY | Facility: CLINIC | Age: 75
End: 2025-04-02
Payer: MEDICARE

## 2025-04-02 VITALS — WEIGHT: 204 LBS | BODY MASS INDEX: 30.21 KG/M2 | HEIGHT: 69 IN

## 2025-04-02 VITALS — HEART RATE: 65 BPM | DIASTOLIC BLOOD PRESSURE: 80 MMHG | SYSTOLIC BLOOD PRESSURE: 124 MMHG

## 2025-04-02 DIAGNOSIS — K76.0 FATTY (CHANGE OF) LIVER, NOT ELSEWHERE CLASSIFIED: ICD-10-CM

## 2025-04-02 DIAGNOSIS — R00.2 PALPITATIONS: ICD-10-CM

## 2025-04-02 DIAGNOSIS — E78.5 HYPERLIPIDEMIA, UNSPECIFIED: ICD-10-CM

## 2025-04-02 DIAGNOSIS — I73.9 PERIPHERAL VASCULAR DISEASE, UNSPECIFIED: ICD-10-CM

## 2025-04-02 DIAGNOSIS — R07.9 CHEST PAIN, UNSPECIFIED: ICD-10-CM

## 2025-04-02 DIAGNOSIS — G47.33 OBSTRUCTIVE SLEEP APNEA (ADULT) (PEDIATRIC): ICD-10-CM

## 2025-04-02 DIAGNOSIS — E03.9 HYPOTHYROIDISM, UNSPECIFIED: ICD-10-CM

## 2025-04-02 DIAGNOSIS — I25.10 ATHEROSCLEROTIC HEART DISEASE OF NATIVE CORONARY ARTERY W/OUT ANGINA PECTORIS: ICD-10-CM

## 2025-04-02 PROCEDURE — 99214 OFFICE O/P EST MOD 30 MIN: CPT

## 2025-04-02 PROCEDURE — G2211 COMPLEX E/M VISIT ADD ON: CPT

## 2025-04-02 PROCEDURE — 93000 ELECTROCARDIOGRAM COMPLETE: CPT

## 2025-04-03 LAB
ALBUMIN SERPL ELPH-MCNC: 4.3 G/DL
ALP BLD-CCNC: 115 U/L
ALT SERPL-CCNC: 16 U/L
ANION GAP SERPL CALC-SCNC: 10 MMOL/L
AST SERPL-CCNC: 18 U/L
BASOPHILS # BLD AUTO: 0.02 K/UL
BASOPHILS NFR BLD AUTO: 0.3 %
BILIRUB SERPL-MCNC: 0.2 MG/DL
BUN SERPL-MCNC: 18 MG/DL
CALCIUM SERPL-MCNC: 9.3 MG/DL
CHLORIDE SERPL-SCNC: 98 MMOL/L
CHOLEST SERPL-MCNC: 165 MG/DL
CO2 SERPL-SCNC: 29 MMOL/L
CREAT SERPL-MCNC: 0.8 MG/DL
EGFRCR SERPLBLD CKD-EPI 2021: 77 ML/MIN/1.73M2
EOSINOPHIL # BLD AUTO: 0.15 K/UL
EOSINOPHIL NFR BLD AUTO: 2.1 %
ESTIMATED AVERAGE GLUCOSE: 117 MG/DL
GLUCOSE SERPL-MCNC: 102 MG/DL
HBA1C MFR BLD HPLC: 5.7 %
HCT VFR BLD CALC: 42.2 %
HDLC SERPL-MCNC: 60 MG/DL
HGB BLD-MCNC: 13.1 G/DL
IMM GRANULOCYTES NFR BLD AUTO: 0.1 %
LDLC SERPL-MCNC: 78 MG/DL
LYMPHOCYTES # BLD AUTO: 2.07 K/UL
LYMPHOCYTES NFR BLD AUTO: 29 %
MAN DIFF?: NORMAL
MCHC RBC-ENTMCNC: 29.7 PG
MCHC RBC-ENTMCNC: 31 G/DL
MCV RBC AUTO: 95.7 FL
MONOCYTES # BLD AUTO: 0.85 K/UL
MONOCYTES NFR BLD AUTO: 11.9 %
NEUTROPHILS # BLD AUTO: 4.05 K/UL
NEUTROPHILS NFR BLD AUTO: 56.6 %
NONHDLC SERPL-MCNC: 105 MG/DL
PLATELET # BLD AUTO: 267 K/UL
PMV BLD AUTO: 0 /100 WBCS
PMV BLD: 10.9 FL
POTASSIUM SERPL-SCNC: 4.3 MMOL/L
PROT SERPL-MCNC: 7.2 G/DL
RBC # BLD: 4.41 M/UL
RBC # FLD: 15.1 %
SODIUM SERPL-SCNC: 137 MMOL/L
TRIGL SERPL-MCNC: 162 MG/DL
TSH SERPL-ACNC: 0.2 UIU/ML
WBC # FLD AUTO: 7.15 K/UL

## 2025-04-08 NOTE — ED PROCEDURE NOTE - NS ED ATTENDING STATEMENT MOD
No protocol for requested medication.    Medication: cyclobenzaprine (FLEXERIL) 10 MG tablet   Last office visit date: 12/20/2024  Pharmacy: [facility] #[id number] - [facility], [place] - [address] AT [address]    Order pended, routed to clinician for review.     Tana Arauz RN    
No adenopathy or splenomegaly. No cervical or inguinal lymphadenopathy.
This was a shared visit with the [facility]. I reviewed and verified the documentation and independently performed the documented:

## 2025-04-24 NOTE — ASU PATIENT PROFILE, ADULT - NSSUBSTANCEUSE_GEN_ALL_CORE_SD
[facility] EMERGENCY DEPARTMENT  eMERGENCY dEPARTMENT eNCOUnter      Pt Name: John Koroma  MRN: [id number]  Birthdate 2005 of evaluation: 4/23/2025  Provider:Jhoan Cantor MD    CHIEF COMPLAINT         HPI    John Koroma is a 19 y.o. male  c/o having malaise, fever, chlls, x 2 days. Denies having flu or Covid contact.    ROS    Review of Systems   Constitutional: Negative.  Negative for chills, fatigue and fever.   Respiratory:  Positive for cough. Negative for shortness of breath.    Cardiovascular:  Negative for chest pain.   Gastrointestinal:  Positive for abdominal pain.   Endocrine: Negative for cold intolerance.   Genitourinary:  Negative for dysuria.   Musculoskeletal: Negative.    Neurological:  Negative for dizziness.   Psychiatric/Behavioral: Negative.     All other systems reviewed and are negative.      Except as noted above the remainder of the review of systems was reviewed and negative.       PAST MEDICAL HISTORY     Past Medical History:   Diagnosis Date    Asthma     Bronchitis    (+)  congenital murmur      SURGICAL HISTORY     No past surgical history on file.      CURRENTMEDICATIONS       Previous Medications    ALBUTEROL SULFATE HFA (VENTOLIN HFA) 108 (90 BASE) MCG/ACT INHALER    Inhale 2 puffs into the lungs 4 times daily as needed for Wheezing or Shortness of Breath    IBUPROFEN (ADVIL;MOTRIN) 600 MG TABLET    Take 1 tablet by mouth every 6 hours as needed       ALLERGIES     Patient has no known allergies.    FAMILY HISTORY     No family history on file.       SOCIAL HISTORY       Social History     Socioeconomic History    Marital status: Single   Tobacco Use    Smoking status: Never    Smokeless tobacco: Never   Substance and Sexual Activity    Alcohol use: No    Drug use: No         PHYSICAL EXAM       ED Triage Vitals [04/23/25 1923]   BP Systolic BP Percentile Diastolic BP Percentile Temp Temp Source Pulse Respirations SpO2   106/68 -- -- 98.5 °F 
never used

## 2025-05-09 ENCOUNTER — EMERGENCY (EMERGENCY)
Facility: HOSPITAL | Age: 75
LOS: 0 days | Discharge: HOME | End: 2025-05-09
Attending: STUDENT IN AN ORGANIZED HEALTH CARE EDUCATION/TRAINING PROGRAM
Payer: MEDICARE

## 2025-05-09 VITALS
RESPIRATION RATE: 20 BRPM | SYSTOLIC BLOOD PRESSURE: 136 MMHG | DIASTOLIC BLOOD PRESSURE: 85 MMHG | OXYGEN SATURATION: 98 % | HEART RATE: 72 BPM

## 2025-05-09 VITALS
TEMPERATURE: 98 F | OXYGEN SATURATION: 98 % | SYSTOLIC BLOOD PRESSURE: 131 MMHG | WEIGHT: 205.03 LBS | RESPIRATION RATE: 20 BRPM | HEART RATE: 77 BPM | DIASTOLIC BLOOD PRESSURE: 68 MMHG

## 2025-05-09 DIAGNOSIS — Z88.5 ALLERGY STATUS TO NARCOTIC AGENT: ICD-10-CM

## 2025-05-09 DIAGNOSIS — R11.0 NAUSEA: ICD-10-CM

## 2025-05-09 DIAGNOSIS — Z91.041 RADIOGRAPHIC DYE ALLERGY STATUS: ICD-10-CM

## 2025-05-09 DIAGNOSIS — Z88.1 ALLERGY STATUS TO OTHER ANTIBIOTIC AGENTS: ICD-10-CM

## 2025-05-09 DIAGNOSIS — H55.00 UNSPECIFIED NYSTAGMUS: ICD-10-CM

## 2025-05-09 DIAGNOSIS — Z98.42 CATARACT EXTRACTION STATUS, LEFT EYE: Chronic | ICD-10-CM

## 2025-05-09 DIAGNOSIS — R42 DIZZINESS AND GIDDINESS: ICD-10-CM

## 2025-05-09 DIAGNOSIS — Z98.890 OTHER SPECIFIED POSTPROCEDURAL STATES: Chronic | ICD-10-CM

## 2025-05-09 DIAGNOSIS — Z98.891 HISTORY OF UTERINE SCAR FROM PREVIOUS SURGERY: Chronic | ICD-10-CM

## 2025-05-09 DIAGNOSIS — Z88.4 ALLERGY STATUS TO ANESTHETIC AGENT: ICD-10-CM

## 2025-05-09 DIAGNOSIS — Z91.040 LATEX ALLERGY STATUS: ICD-10-CM

## 2025-05-09 DIAGNOSIS — Z88.8 ALLERGY STATUS TO OTHER DRUGS, MEDICAMENTS AND BIOLOGICAL SUBSTANCES: ICD-10-CM

## 2025-05-09 DIAGNOSIS — Z88.0 ALLERGY STATUS TO PENICILLIN: ICD-10-CM

## 2025-05-09 DIAGNOSIS — Z91.048 OTHER NONMEDICINAL SUBSTANCE ALLERGY STATUS: ICD-10-CM

## 2025-05-09 LAB
ALBUMIN SERPL ELPH-MCNC: 4.2 G/DL — SIGNIFICANT CHANGE UP (ref 3.5–5.2)
ALP SERPL-CCNC: 108 U/L — SIGNIFICANT CHANGE UP (ref 30–115)
ALT FLD-CCNC: 16 U/L — SIGNIFICANT CHANGE UP (ref 0–41)
ANION GAP SERPL CALC-SCNC: 10 MMOL/L — SIGNIFICANT CHANGE UP (ref 7–14)
APTT BLD: 33.6 SEC — SIGNIFICANT CHANGE UP (ref 27–39.2)
AST SERPL-CCNC: 16 U/L — SIGNIFICANT CHANGE UP (ref 0–41)
BASOPHILS # BLD AUTO: 0.02 K/UL — SIGNIFICANT CHANGE UP (ref 0–0.2)
BASOPHILS NFR BLD AUTO: 0.2 % — SIGNIFICANT CHANGE UP (ref 0–1)
BILIRUB SERPL-MCNC: <0.2 MG/DL — SIGNIFICANT CHANGE UP (ref 0.2–1.2)
BUN SERPL-MCNC: 21 MG/DL — HIGH (ref 10–20)
CALCIUM SERPL-MCNC: 9 MG/DL — SIGNIFICANT CHANGE UP (ref 8.4–10.5)
CHLORIDE SERPL-SCNC: 97 MMOL/L — LOW (ref 98–110)
CO2 SERPL-SCNC: 28 MMOL/L — SIGNIFICANT CHANGE UP (ref 17–32)
CREAT SERPL-MCNC: 1.1 MG/DL — SIGNIFICANT CHANGE UP (ref 0.7–1.5)
EGFR: 53 ML/MIN/1.73M2 — LOW
EGFR: 53 ML/MIN/1.73M2 — LOW
EOSINOPHIL # BLD AUTO: 0.22 K/UL — SIGNIFICANT CHANGE UP (ref 0–0.7)
EOSINOPHIL NFR BLD AUTO: 2.5 % — SIGNIFICANT CHANGE UP (ref 0–8)
GLUCOSE SERPL-MCNC: 127 MG/DL — HIGH (ref 70–99)
HCT VFR BLD CALC: 38.1 % — SIGNIFICANT CHANGE UP (ref 37–47)
HGB BLD-MCNC: 12.7 G/DL — SIGNIFICANT CHANGE UP (ref 12–16)
IMM GRANULOCYTES NFR BLD AUTO: 0.2 % — SIGNIFICANT CHANGE UP (ref 0.1–0.3)
INR BLD: 0.98 RATIO — SIGNIFICANT CHANGE UP (ref 0.65–1.3)
LYMPHOCYTES # BLD AUTO: 2.79 K/UL — SIGNIFICANT CHANGE UP (ref 1.2–3.4)
LYMPHOCYTES # BLD AUTO: 31.8 % — SIGNIFICANT CHANGE UP (ref 20.5–51.1)
MAGNESIUM SERPL-MCNC: 2 MG/DL — SIGNIFICANT CHANGE UP (ref 1.8–2.4)
MCHC RBC-ENTMCNC: 30.2 PG — SIGNIFICANT CHANGE UP (ref 27–31)
MCHC RBC-ENTMCNC: 33.3 G/DL — SIGNIFICANT CHANGE UP (ref 32–37)
MCV RBC AUTO: 90.5 FL — SIGNIFICANT CHANGE UP (ref 81–99)
MONOCYTES # BLD AUTO: 0.98 K/UL — HIGH (ref 0.1–0.6)
MONOCYTES NFR BLD AUTO: 11.2 % — HIGH (ref 1.7–9.3)
NEUTROPHILS # BLD AUTO: 4.73 K/UL — SIGNIFICANT CHANGE UP (ref 1.4–6.5)
NEUTROPHILS NFR BLD AUTO: 54.1 % — SIGNIFICANT CHANGE UP (ref 42.2–75.2)
NRBC BLD AUTO-RTO: 0 /100 WBCS — SIGNIFICANT CHANGE UP (ref 0–0)
PLATELET # BLD AUTO: 231 K/UL — SIGNIFICANT CHANGE UP (ref 130–400)
PMV BLD: 9.8 FL — SIGNIFICANT CHANGE UP (ref 7.4–10.4)
POTASSIUM SERPL-MCNC: 3.7 MMOL/L — SIGNIFICANT CHANGE UP (ref 3.5–5)
POTASSIUM SERPL-SCNC: 3.7 MMOL/L — SIGNIFICANT CHANGE UP (ref 3.5–5)
PROT SERPL-MCNC: 6.6 G/DL — SIGNIFICANT CHANGE UP (ref 6–8)
PROTHROM AB SERPL-ACNC: 11.5 SEC — SIGNIFICANT CHANGE UP (ref 9.95–12.87)
RBC # BLD: 4.21 M/UL — SIGNIFICANT CHANGE UP (ref 4.2–5.4)
RBC # FLD: 13.7 % — SIGNIFICANT CHANGE UP (ref 11.5–14.5)
SODIUM SERPL-SCNC: 135 MMOL/L — SIGNIFICANT CHANGE UP (ref 135–146)
WBC # BLD: 8.76 K/UL — SIGNIFICANT CHANGE UP (ref 4.8–10.8)
WBC # FLD AUTO: 8.76 K/UL — SIGNIFICANT CHANGE UP (ref 4.8–10.8)

## 2025-05-09 PROCEDURE — 82962 GLUCOSE BLOOD TEST: CPT

## 2025-05-09 PROCEDURE — 36415 COLL VENOUS BLD VENIPUNCTURE: CPT

## 2025-05-09 PROCEDURE — 70498 CT ANGIOGRAPHY NECK: CPT | Mod: 26

## 2025-05-09 PROCEDURE — 96375 TX/PRO/DX INJ NEW DRUG ADDON: CPT | Mod: XU

## 2025-05-09 PROCEDURE — 93005 ELECTROCARDIOGRAM TRACING: CPT

## 2025-05-09 PROCEDURE — 83735 ASSAY OF MAGNESIUM: CPT

## 2025-05-09 PROCEDURE — 96374 THER/PROPH/DIAG INJ IV PUSH: CPT | Mod: XU

## 2025-05-09 PROCEDURE — 70498 CT ANGIOGRAPHY NECK: CPT

## 2025-05-09 PROCEDURE — 85025 COMPLETE CBC W/AUTO DIFF WBC: CPT

## 2025-05-09 PROCEDURE — 85610 PROTHROMBIN TIME: CPT

## 2025-05-09 PROCEDURE — 70450 CT HEAD/BRAIN W/O DYE: CPT | Mod: 26,XU

## 2025-05-09 PROCEDURE — 99285 EMERGENCY DEPT VISIT HI MDM: CPT | Mod: FS

## 2025-05-09 PROCEDURE — 70496 CT ANGIOGRAPHY HEAD: CPT | Mod: 26

## 2025-05-09 PROCEDURE — 93010 ELECTROCARDIOGRAM REPORT: CPT

## 2025-05-09 PROCEDURE — 70496 CT ANGIOGRAPHY HEAD: CPT

## 2025-05-09 PROCEDURE — 99285 EMERGENCY DEPT VISIT HI MDM: CPT | Mod: 25

## 2025-05-09 PROCEDURE — 70450 CT HEAD/BRAIN W/O DYE: CPT | Mod: XU

## 2025-05-09 PROCEDURE — 85730 THROMBOPLASTIN TIME PARTIAL: CPT

## 2025-05-09 PROCEDURE — 80053 COMPREHEN METABOLIC PANEL: CPT

## 2025-05-09 RX ORDER — MECLIZINE HCL 12.5 MG
1 TABLET ORAL
Qty: 20 | Refills: 0
Start: 2025-05-09

## 2025-05-09 RX ORDER — DIPHENHYDRAMINE HCL 12.5MG/5ML
50 ELIXIR ORAL ONCE
Refills: 0 | Status: COMPLETED | OUTPATIENT
Start: 2025-05-09 | End: 2025-05-09

## 2025-05-09 RX ORDER — ONDANSETRON HCL/PF 4 MG/2 ML
4 VIAL (ML) INJECTION ONCE
Refills: 0 | Status: COMPLETED | OUTPATIENT
Start: 2025-05-09 | End: 2025-05-09

## 2025-05-09 RX ORDER — SODIUM CHLORIDE 9 G/1000ML
1000 INJECTION, SOLUTION INTRAVENOUS ONCE
Refills: 0 | Status: COMPLETED | OUTPATIENT
Start: 2025-05-09 | End: 2025-05-09

## 2025-05-09 RX ORDER — MECLIZINE HCL 12.5 MG
25 TABLET ORAL ONCE
Refills: 0 | Status: COMPLETED | OUTPATIENT
Start: 2025-05-09 | End: 2025-05-09

## 2025-05-09 RX ADMIN — SODIUM CHLORIDE 1000 MILLILITER(S): 9 INJECTION, SOLUTION INTRAVENOUS at 15:29

## 2025-05-09 RX ADMIN — Medication 25 MILLIGRAM(S): at 15:22

## 2025-05-09 RX ADMIN — Medication 50 MILLIGRAM(S): at 15:21

## 2025-05-09 RX ADMIN — Medication 4 MILLIGRAM(S): at 15:21

## 2025-05-09 NOTE — ED PROVIDER NOTE - ATTENDING APP SHARED VISIT CONTRIBUTION OF CARE
I saw and evaluated the patient independently and the note reflects the combined entries of the resident/PA and myself as the attending physician.  I have made corrections and additions to the documentation as needed.    Please see my MDM for further details.    I reviewed patient's prior CT angio from <1 year ago.  She had no significant carotid artery stenosis occlusion.  No significant atherosclerosis. discussed risks/benefits of further braining imaging vs waiting to reassess after meds. pt very anxious and would like imaging. will premedicate with benadryl. I saw and evaluated the patient independently and the note reflects the combined entries of the resident/PA and myself as the attending physician.  I have made corrections and additions to the documentation as needed.    Please see my MDM for further details.    I reviewed patient's prior CT angio from <1 year ago.  She had no significant carotid artery stenosis occlusion.  No significant atherosclerosis. discussed risks/benefits of further braining imaging vs waiting to reassess after meds. pt very anxious and would like imaging. will premedicate with benadryl..

## 2025-05-09 NOTE — ED PROVIDER NOTE - OBJECTIVE STATEMENT
Patient c/o dizziness, H/o vertigo several times in the past, Was cleaning house then room began spinning with nausea,  felt better with closing eyes and not moving head, Sx return with any head movement, No HA, no chest pain no fever

## 2025-05-09 NOTE — ED PROVIDER NOTE - PATIENT PORTAL LINK FT
You can access the FollowMyHealth Patient Portal offered by [facility] by registering at the following website: [email or web address]. By joining [employer]’s FollowMyHealth portal, you will also be able to view your health information using other applications (apps) compatible with our system.

## 2025-05-09 NOTE — ED PROVIDER NOTE - CLINICAL SUMMARY MEDICAL DECISION MAKING FREE TEXT BOX
74-year-old female with a history of vertigo presenting with sudden onset dizziness about 1 hour prior to arrival when leaning down.  Patient reports worsening of her vertigo symptoms when she changes position moves her head or eyes quickly.  Patient reports when she lays still with her eyes closed the vertigo/room feeling sensation subsides.  She reports this feels similar to her prior vertigo but she has not got an episode in a long time.  Patient denies any new viral syndromes or illnesses.  Patient feels nauseous but has not had having any vomiting    On exam patient is alert and oriented x 3.  Has rightward horizontal beating nystagmus.  No significantly noted corrective saccade, however patient's head impulse test limited by patient participation and pain of her neck.  Patient has no skew deviation.  Her hints exam is significantly peripheral. she has a positive Romberg's but no dysdiadochokinesia or dysarthria   She denies any tinnitus or hearing loss.  Her NIH score is 0.    Given the exam, patient's history very suspicion this is more likely peripheral cause of vertigo versus central cause.  Will provide medications and reassess.  if Patient is not feeling well may consider further imaging however patient does have a contrast allergy and requires premedication 74-year-old female with a history of vertigo presenting with sudden onset dizziness about 1 hour prior to arrival when leaning down.  Patient reports worsening of her vertigo symptoms when she changes position moves her head or eyes quickly.  Patient reports when she lays still with her eyes closed the vertigo/room feeling sensation subsides.  She reports this feels similar to her prior vertigo but she has not got an episode in a long time.  Patient denies any new viral syndromes or illnesses.  Patient feels nauseous but has not had having any vomiting    On exam patient is alert and oriented x 3.  Has rightward horizontal beating nystagmus.  No significantly noted corrective saccade, however patient's head impulse test limited by patient participation and pain of her neck.  Patient has no skew deviation.  Her hints exam is significantly peripheral. she has a positive Romberg's but no dysdiadochokinesia or dysarthria. was able to ambulate to bathroom.    She denies any tinnitus or hearing loss.  Her NIH score is 0 thus no stroke code activated.     Given the exam, patient's history very suspicion this is more likely peripheral cause of vertigo versus central cause.  Will provide medications and reassess.  if Patient is not feeling well may consider further imaging however patient does have a contrast allergy and requires premedication 74-year-old female with a history of vertigo presenting with sudden onset dizziness about 1 hour prior to arrival when leaning down.  Patient reports worsening of her vertigo symptoms when she changes position moves her head or eyes quickly.  Patient reports when she lays still with her eyes closed the vertigo/room feeling sensation subsides.  She reports this feels similar to her prior vertigo but she has not got an episode in a long time.  Patient denies any new viral syndromes or illnesses.  Patient feels nauseous but has not had having any vomiting    On exam patient is alert and oriented x 3.  Has rightward horizontal beating nystagmus.  No significantly noted corrective saccade, however patient's head impulse test limited by patient participation and pain of her neck.  Patient has no skew deviation.  Her hints exam is significantly peripheral. she has a positive Romberg's but no dysdiadochokinesia or dysarthria. was able to ambulate to bathroom.    She denies any tinnitus or hearing loss.  Her NIH score is 0 thus no stroke code activated.     Given the exam, patient's history very suspicion this is more likely peripheral cause of vertigo versus central cause.  Will provide medications and reassess.  if Patient is not feeling well may consider further imaging however patient does have a contrast allergy and requires premedication      Patient feels improved after treatments with medications in the ED.  Was able to ambulate in the emergency room without any assistance.  Imaging unremarkable.  Suspect BPPV.  Patient given outpatient follow-up

## 2025-06-09 ENCOUNTER — APPOINTMENT (OUTPATIENT)
Dept: PULMONOLOGY | Facility: CLINIC | Age: 75
End: 2025-06-09
Payer: MEDICARE

## 2025-06-09 VITALS
HEART RATE: 58 BPM | SYSTOLIC BLOOD PRESSURE: 122 MMHG | RESPIRATION RATE: 14 BRPM | DIASTOLIC BLOOD PRESSURE: 70 MMHG | OXYGEN SATURATION: 98 % | WEIGHT: 205 LBS | BODY MASS INDEX: 30.27 KG/M2

## 2025-06-09 PROCEDURE — 99214 OFFICE O/P EST MOD 30 MIN: CPT

## 2025-06-16 NOTE — ED ADULT TRIAGE NOTE - CCCP TRG CHIEF CMPLNT
Medication passed protocol.     Medication:   losartan (COZAAR) 50 MG tablet 90 tablet 1 12/16/2024 --    Sig - Route: TAKE 1 TABLET BY MOUTH EVERY DAY - Oral     passed protocol.   Last office visit date: 08/02/2024  Next appointment scheduled?: Yes     Upcoming appointment scheduled on: 8/6/2025   Per last office visit, patient is to follow up Return in about 1 year (around 8/2/2025) for Medicare Wellness Visit, sooner as needed .   Last refill on: 03/15/2025      
ankle pain/injury

## 2025-06-19 ENCOUNTER — EMERGENCY (EMERGENCY)
Facility: HOSPITAL | Age: 75
LOS: 0 days | Discharge: HOME | End: 2025-06-20
Attending: STUDENT IN AN ORGANIZED HEALTH CARE EDUCATION/TRAINING PROGRAM
Payer: MEDICARE

## 2025-06-19 VITALS
RESPIRATION RATE: 20 BRPM | SYSTOLIC BLOOD PRESSURE: 111 MMHG | HEART RATE: 70 BPM | HEIGHT: 69 IN | WEIGHT: 205.03 LBS | OXYGEN SATURATION: 99 % | DIASTOLIC BLOOD PRESSURE: 71 MMHG | TEMPERATURE: 98 F

## 2025-06-19 DIAGNOSIS — Z91.048 OTHER NONMEDICINAL SUBSTANCE ALLERGY STATUS: ICD-10-CM

## 2025-06-19 DIAGNOSIS — R00.2 PALPITATIONS: ICD-10-CM

## 2025-06-19 DIAGNOSIS — Z88.5 ALLERGY STATUS TO NARCOTIC AGENT: ICD-10-CM

## 2025-06-19 DIAGNOSIS — I49.1 ATRIAL PREMATURE DEPOLARIZATION: ICD-10-CM

## 2025-06-19 DIAGNOSIS — E03.9 HYPOTHYROIDISM, UNSPECIFIED: ICD-10-CM

## 2025-06-19 DIAGNOSIS — I34.1 NONRHEUMATIC MITRAL (VALVE) PROLAPSE: ICD-10-CM

## 2025-06-19 DIAGNOSIS — Z88.8 ALLERGY STATUS TO OTHER DRUGS, MEDICAMENTS AND BIOLOGICAL SUBSTANCES: ICD-10-CM

## 2025-06-19 DIAGNOSIS — Z86.711 PERSONAL HISTORY OF PULMONARY EMBOLISM: ICD-10-CM

## 2025-06-19 DIAGNOSIS — G43.909 MIGRAINE, UNSPECIFIED, NOT INTRACTABLE, WITHOUT STATUS MIGRAINOSUS: ICD-10-CM

## 2025-06-19 DIAGNOSIS — Z98.891 HISTORY OF UTERINE SCAR FROM PREVIOUS SURGERY: Chronic | ICD-10-CM

## 2025-06-19 DIAGNOSIS — G47.33 OBSTRUCTIVE SLEEP APNEA (ADULT) (PEDIATRIC): ICD-10-CM

## 2025-06-19 DIAGNOSIS — Z98.890 OTHER SPECIFIED POSTPROCEDURAL STATES: Chronic | ICD-10-CM

## 2025-06-19 DIAGNOSIS — I10 ESSENTIAL (PRIMARY) HYPERTENSION: ICD-10-CM

## 2025-06-19 DIAGNOSIS — Z88.4 ALLERGY STATUS TO ANESTHETIC AGENT: ICD-10-CM

## 2025-06-19 DIAGNOSIS — K21.9 GASTRO-ESOPHAGEAL REFLUX DISEASE WITHOUT ESOPHAGITIS: ICD-10-CM

## 2025-06-19 DIAGNOSIS — Z91.041 RADIOGRAPHIC DYE ALLERGY STATUS: ICD-10-CM

## 2025-06-19 DIAGNOSIS — Z88.0 ALLERGY STATUS TO PENICILLIN: ICD-10-CM

## 2025-06-19 DIAGNOSIS — I44.0 ATRIOVENTRICULAR BLOCK, FIRST DEGREE: ICD-10-CM

## 2025-06-19 DIAGNOSIS — Z88.1 ALLERGY STATUS TO OTHER ANTIBIOTIC AGENTS: ICD-10-CM

## 2025-06-19 DIAGNOSIS — F41.9 ANXIETY DISORDER, UNSPECIFIED: ICD-10-CM

## 2025-06-19 DIAGNOSIS — R42 DIZZINESS AND GIDDINESS: ICD-10-CM

## 2025-06-19 DIAGNOSIS — Z98.42 CATARACT EXTRACTION STATUS, LEFT EYE: Chronic | ICD-10-CM

## 2025-06-19 DIAGNOSIS — Z91.040 LATEX ALLERGY STATUS: ICD-10-CM

## 2025-06-19 PROCEDURE — 99282 EMERGENCY DEPT VISIT SF MDM: CPT

## 2025-06-19 PROCEDURE — 99284 EMERGENCY DEPT VISIT MOD MDM: CPT | Mod: FS

## 2025-06-19 NOTE — ED ADULT TRIAGE NOTE - BIRTH SEX
Pt notified of lab results and forwarded to appt line to schedule follow up in 3 months. Patient has already picked up Metformin from pharmacy.    Female

## 2025-06-20 DIAGNOSIS — Z90.49 ACQUIRED ABSENCE OF OTHER SPECIFIED PARTS OF DIGESTIVE TRACT: Chronic | ICD-10-CM

## 2025-06-20 NOTE — ED PROVIDER NOTE - NSFOLLOWUPINSTRUCTIONS_ED_ALL_ED_FT
**follow up as scheduled with Dr Turner Patton    A palpitation is the feeling that your heartbeat is irregular or is faster than normal. It may feel like your heart is fluttering or skipping a beat. They may be caused by many things, including smoking, caffeine, alcohol, stress, and certain medicines. Although most causes of palpitations are not serious, palpitations can be a sign of a serious medical problem. Avoid caffeine, alcohol, and tobacco products at home. Try to reduce stress and anxiety and make sure to get plenty of rest.     SEEK IMMEDIATE MEDICAL CARE IF YOU HAVE ANY OF THE FOLLOWING SYMPTOMS: chest pain, shortness of breath, severe headache, dizziness/lightheadedness, or fainting.

## 2025-06-20 NOTE — ED PROVIDER NOTE - OBJECTIVE STATEMENT
74-year-old female PMH PE no longer on Eliquis, palpitations, hypothyroidism, hypertension, anxiety, PACs on cardizem presenting to the ED for evaluation of intermittent palpitations for 1 year.  Patient states that symptoms are worsened when she eats or drinks, does not matter what she eats or drinks to states recent symptoms.  Denies fever, chills, chest pain, calf pain, nausea, vomiting or abdominal pain.

## 2025-06-20 NOTE — ED PROVIDER NOTE - PATIENT PORTAL LINK FT
You can access the FollowMyHealth Patient Portal offered by [facility] by registering at the following website: [email or web address]. By joining [employer]’s FollowMyHealth portal, you will also be able to view your health information using other applications (apps) compatible with our system.

## 2025-06-20 NOTE — ED ADULT NURSE NOTE - NSFALLUNIVINTERV_ED_ALL_ED
Bed/Stretcher in lowest position, wheels locked, appropriate side rails in place/Call bell, personal items and telephone in reach/Instruct patient to call for assistance before getting out of bed/chair/stretcher/Non-slip footwear applied when patient is off stretcher/[place] to call system/Physically safe environment - no spills, clutter or unnecessary equipment/Purposeful proactive rounding/Room/bathroom lighting operational, light cord in reach

## 2025-06-20 NOTE — ED PROVIDER NOTE - PHYSICAL EXAMINATION
GENERAL: Well-nourished, Well-developed. NAD.  HEAD: No visible or palpable bumps or hematomas. No ecchymosis behind ears B/L.  Neck: Supple. FROM  CVS: No reproducible chest wall tenderness. Normal S1,S2. No murmurs appreciated on auscultation   RESP: No use of accessory muscles. Chest rise symmetrical with good expansion. Lungs clear to auscultation B/L. No wheezing, rales, or rhonchi auscultated.  Skin: Warm, Dry. No rashes or lesions. Good cap refill < 2 sec B/L.  EXT: Radial and pedal pulses present B/L. No calf tenderness or swelling B/L. No palpable cords. No pedal edema B/L.

## 2025-06-20 NOTE — ED PROVIDER NOTE - NSICDXPASTMEDICALHX_GEN_ALL_CORE_FT
PAST MEDICAL HISTORY:  Cataract     Gastroesophageal reflux disease     H/O: rheumatic fever     Herniated disc, cervical     History of ear surgery     History of palpitations     History of surgery on arm     Hypertension     Hypothyroid     Migraines     Mitral valve prolapse     ETHAN on CPAP     Pulmonary embolism     Uterine polyp

## 2025-06-20 NOTE — ED PROVIDER NOTE - CLINICAL SUMMARY MEDICAL DECISION MAKING FREE TEXT BOX
Pt here with intermittent palpitations when eating/drinking. No other sx. Vitals wnl in ED. Ekg here with NSR, no ischemic changes. Pt reassured and does not want further workup in ED. Stable for discharge.

## 2025-06-20 NOTE — ED PROVIDER NOTE - ATTENDING APP SHARED VISIT CONTRIBUTION OF CARE
73 yo F with hx of PE no longer on eliquis, migraines, anxiety, PACs on cardizem, vertigo, GERD, HTN, hypothyroidism, migraines, MVP, ETHAN on CPAP *** 73 yo F with hx of PE no longer on eliquis, migraines, anxiety, PACs on cardizem, vertigo, GERD, HTN, hypothyroidism, migraines, MVP, ETHAN on CPAP who presents with intermittent palpitations x1 wk. Says she feels palpitations only when she eats or drinks. No fever, cp, sob, dizziness, nausea, vomiting, leg swelling/pain. She came to ED just to make sure her ekg is ok so that she can go home as her son is tired and needs to sleep. Has cardio appt on 6/24 for echo. Says she has had palpitations in the past which were due to PACs and sometimes PVCs. Recently had synthroid dose decreased.    Cardio Dr. Merino    CONSTITUTIONAL: well developed, nontoxic appearing, in no acute distress, speaking in full sentences  SKIN: warm, dry, no rash, cap refill < 2 seconds  HEENT: normocephalic, atraumatic, no conjunctival erythema, moist mucous membranes, patent airway  NECK: supple  CV:  regular rate, regular rhythm, 2+ radial pulses bilaterally  RESP: no wheezes, no rales, no rhonchi, normal work of breathing  ABD: soft, no tenderness, nondistended, no rebound, no guarding  MSK: normal ROM, no cyanosis, no edema, no calf tenderness  NEURO: alert, oriented, grossly unremarkable  PSYCH: cooperative, appropriate    A&P:  Pt here with intermittent palpitations when eating/drinking. No other sx. Vitals wnl in ED. Ekg here with NSR, no ischemic changes. Pt reassured and does not want further workup in ED. Stable for discharge.

## 2025-06-20 NOTE — ED PROVIDER NOTE - DIFFERENTIAL DIAGNOSIS
Differential Diagnosis differential dx includes but is not limited to:  arrhythmia. less likely acs or pe

## 2025-06-20 NOTE — ED PROVIDER NOTE - NSICDXPASTSURGICALHX_GEN_ALL_CORE_FT
PAST SURGICAL HISTORY:  Cataract extraction status of eye, left     H/O knee surgery torn minsicus repair b/l knee    H/O:      History of cholecystectomy

## 2025-06-24 ENCOUNTER — APPOINTMENT (OUTPATIENT)
Dept: CV DIAGNOSITCS | Facility: HOSPITAL | Age: 75
End: 2025-06-24
Payer: MEDICARE

## 2025-06-24 ENCOUNTER — RESULT REVIEW (OUTPATIENT)
Age: 75
End: 2025-06-24

## 2025-06-24 ENCOUNTER — OUTPATIENT (OUTPATIENT)
Dept: OUTPATIENT SERVICES | Facility: HOSPITAL | Age: 75
LOS: 1 days | End: 2025-06-24
Payer: MEDICARE

## 2025-06-24 DIAGNOSIS — Z98.42 CATARACT EXTRACTION STATUS, LEFT EYE: Chronic | ICD-10-CM

## 2025-06-24 DIAGNOSIS — I35.1 NONRHEUMATIC AORTIC (VALVE) INSUFFICIENCY: ICD-10-CM

## 2025-06-24 DIAGNOSIS — Z90.49 ACQUIRED ABSENCE OF OTHER SPECIFIED PARTS OF DIGESTIVE TRACT: Chronic | ICD-10-CM

## 2025-06-24 DIAGNOSIS — Z98.890 OTHER SPECIFIED POSTPROCEDURAL STATES: Chronic | ICD-10-CM

## 2025-06-24 DIAGNOSIS — Z98.891 HISTORY OF UTERINE SCAR FROM PREVIOUS SURGERY: Chronic | ICD-10-CM

## 2025-06-24 PROCEDURE — 93320 DOPPLER ECHO COMPLETE: CPT

## 2025-06-24 PROCEDURE — 93351 STRESS TTE COMPLETE: CPT

## 2025-06-24 PROCEDURE — 93351 STRESS TTE COMPLETE: CPT | Mod: 26

## 2025-06-24 PROCEDURE — 93325 DOPPLER ECHO COLOR FLOW MAPG: CPT

## 2025-06-25 DIAGNOSIS — I35.1 NONRHEUMATIC AORTIC (VALVE) INSUFFICIENCY: ICD-10-CM

## 2025-07-03 ENCOUNTER — APPOINTMENT (OUTPATIENT)
Dept: CARDIOLOGY | Facility: CLINIC | Age: 75
End: 2025-07-03

## 2025-08-04 ENCOUNTER — NON-APPOINTMENT (OUTPATIENT)
Age: 75
End: 2025-08-04

## 2025-08-06 ENCOUNTER — APPOINTMENT (OUTPATIENT)
Dept: CARDIOLOGY | Facility: CLINIC | Age: 75
End: 2025-08-06
Payer: MEDICARE

## 2025-08-06 VITALS — TEMPERATURE: 97.6 F | WEIGHT: 211 LBS | HEIGHT: 69 IN | BODY MASS INDEX: 31.25 KG/M2

## 2025-08-06 VITALS — DIASTOLIC BLOOD PRESSURE: 72 MMHG | SYSTOLIC BLOOD PRESSURE: 134 MMHG

## 2025-08-06 DIAGNOSIS — I73.9 PERIPHERAL VASCULAR DISEASE, UNSPECIFIED: ICD-10-CM

## 2025-08-06 DIAGNOSIS — G47.33 OBSTRUCTIVE SLEEP APNEA (ADULT) (PEDIATRIC): ICD-10-CM

## 2025-08-06 DIAGNOSIS — R00.2 PALPITATIONS: ICD-10-CM

## 2025-08-06 DIAGNOSIS — K76.0 FATTY (CHANGE OF) LIVER, NOT ELSEWHERE CLASSIFIED: ICD-10-CM

## 2025-08-06 DIAGNOSIS — I25.10 ATHEROSCLEROTIC HEART DISEASE OF NATIVE CORONARY ARTERY W/OUT ANGINA PECTORIS: ICD-10-CM

## 2025-08-06 DIAGNOSIS — E78.5 HYPERLIPIDEMIA, UNSPECIFIED: ICD-10-CM

## 2025-08-06 DIAGNOSIS — E03.9 HYPOTHYROIDISM, UNSPECIFIED: ICD-10-CM

## 2025-08-06 PROCEDURE — 99214 OFFICE O/P EST MOD 30 MIN: CPT

## 2025-08-06 PROCEDURE — G2211 COMPLEX E/M VISIT ADD ON: CPT

## 2025-08-06 RX ORDER — LEVOTHYROXINE SODIUM 125 UG/1
125 TABLET ORAL
Refills: 0 | Status: ACTIVE | COMMUNITY

## 2025-09-10 ENCOUNTER — NON-APPOINTMENT (OUTPATIENT)
Age: 75
End: 2025-09-10